# Patient Record
Sex: FEMALE | Race: WHITE
[De-identification: names, ages, dates, MRNs, and addresses within clinical notes are randomized per-mention and may not be internally consistent; named-entity substitution may affect disease eponyms.]

---

## 2022-03-31 ENCOUNTER — HOSPITAL ENCOUNTER (INPATIENT)
Dept: HOSPITAL 47 - 3SCARD | Age: 72
LOS: 15 days | Discharge: NURSING, REHAB OR LONG-TERM CARE | DRG: 246 | End: 2022-04-15
Attending: INTERNAL MEDICINE | Admitting: INTERNAL MEDICINE
Payer: MEDICARE

## 2022-03-31 VITALS — BODY MASS INDEX: 36.4 KG/M2

## 2022-03-31 DIAGNOSIS — I65.23: ICD-10-CM

## 2022-03-31 DIAGNOSIS — K05.10: ICD-10-CM

## 2022-03-31 DIAGNOSIS — I25.5: ICD-10-CM

## 2022-03-31 DIAGNOSIS — Z90.721: ICD-10-CM

## 2022-03-31 DIAGNOSIS — Z82.3: ICD-10-CM

## 2022-03-31 DIAGNOSIS — K56.41: ICD-10-CM

## 2022-03-31 DIAGNOSIS — Z87.01: ICD-10-CM

## 2022-03-31 DIAGNOSIS — I25.10: Primary | ICD-10-CM

## 2022-03-31 DIAGNOSIS — Z95.5: ICD-10-CM

## 2022-03-31 DIAGNOSIS — D62: ICD-10-CM

## 2022-03-31 DIAGNOSIS — I21.4: ICD-10-CM

## 2022-03-31 DIAGNOSIS — R53.83: ICD-10-CM

## 2022-03-31 DIAGNOSIS — I25.2: ICD-10-CM

## 2022-03-31 DIAGNOSIS — Z79.899: ICD-10-CM

## 2022-03-31 DIAGNOSIS — Z79.02: ICD-10-CM

## 2022-03-31 DIAGNOSIS — I27.20: ICD-10-CM

## 2022-03-31 DIAGNOSIS — Z87.891: ICD-10-CM

## 2022-03-31 DIAGNOSIS — Z28.310: ICD-10-CM

## 2022-03-31 DIAGNOSIS — Z88.8: ICD-10-CM

## 2022-03-31 DIAGNOSIS — Z95.2: ICD-10-CM

## 2022-03-31 DIAGNOSIS — J96.11: ICD-10-CM

## 2022-03-31 DIAGNOSIS — Z99.81: ICD-10-CM

## 2022-03-31 DIAGNOSIS — I50.23: ICD-10-CM

## 2022-03-31 DIAGNOSIS — Z79.82: ICD-10-CM

## 2022-03-31 DIAGNOSIS — J98.4: ICD-10-CM

## 2022-03-31 DIAGNOSIS — Z82.49: ICD-10-CM

## 2022-03-31 DIAGNOSIS — Z20.822: ICD-10-CM

## 2022-03-31 DIAGNOSIS — I25.42: ICD-10-CM

## 2022-03-31 DIAGNOSIS — I42.0: ICD-10-CM

## 2022-03-31 DIAGNOSIS — E78.5: ICD-10-CM

## 2022-03-31 DIAGNOSIS — Z90.5: ICD-10-CM

## 2022-03-31 DIAGNOSIS — J44.9: ICD-10-CM

## 2022-03-31 DIAGNOSIS — I08.3: ICD-10-CM

## 2022-03-31 DIAGNOSIS — J96.21: ICD-10-CM

## 2022-03-31 DIAGNOSIS — M62.81: ICD-10-CM

## 2022-03-31 DIAGNOSIS — F33.9: ICD-10-CM

## 2022-03-31 DIAGNOSIS — N17.9: ICD-10-CM

## 2022-03-31 DIAGNOSIS — Z63.4: ICD-10-CM

## 2022-03-31 DIAGNOSIS — I11.0: ICD-10-CM

## 2022-03-31 DIAGNOSIS — I50.82: ICD-10-CM

## 2022-03-31 DIAGNOSIS — J91.8: ICD-10-CM

## 2022-03-31 DIAGNOSIS — I97.630: ICD-10-CM

## 2022-03-31 DIAGNOSIS — K66.1: ICD-10-CM

## 2022-03-31 DIAGNOSIS — Z83.2: ICD-10-CM

## 2022-03-31 DIAGNOSIS — Z53.9: ICD-10-CM

## 2022-03-31 DIAGNOSIS — E66.2: ICD-10-CM

## 2022-03-31 DIAGNOSIS — F41.1: ICD-10-CM

## 2022-03-31 LAB
ALBUMIN SERPL-MCNC: 3.7 G/DL (ref 3.5–5)
ALP SERPL-CCNC: 59 U/L (ref 38–126)
ALT SERPL-CCNC: 16 U/L (ref 4–34)
ANION GAP SERPL CALC-SCNC: 7 MMOL/L
APTT BLD: 42.9 SEC (ref 22–30)
AST SERPL-CCNC: 21 U/L (ref 14–36)
BASOPHILS # BLD AUTO: 0.1 K/UL (ref 0–0.2)
BASOPHILS NFR BLD AUTO: 1 %
BUN SERPL-SCNC: 22 MG/DL (ref 7–17)
CALCIUM SPEC-MCNC: 8.8 MG/DL (ref 8.4–10.2)
CHLORIDE SERPL-SCNC: 100 MMOL/L (ref 98–107)
CO2 SERPL-SCNC: 28 MMOL/L (ref 22–30)
EOSINOPHIL # BLD AUTO: 0 K/UL (ref 0–0.7)
EOSINOPHIL NFR BLD AUTO: 1 %
ERYTHROCYTE [DISTWIDTH] IN BLOOD BY AUTOMATED COUNT: 4.44 M/UL (ref 3.8–5.4)
ERYTHROCYTE [DISTWIDTH] IN BLOOD: 14 % (ref 11.5–15.5)
GLUCOSE BLD-MCNC: 133 MG/DL (ref 75–99)
GLUCOSE SERPL-MCNC: 128 MG/DL (ref 74–99)
HCT VFR BLD AUTO: 40.6 % (ref 34–46)
HGB BLD-MCNC: 12.5 GM/DL (ref 11.4–16)
INR PPP: 1.1 (ref ?–1.2)
LYMPHOCYTES # SPEC AUTO: 0.6 K/UL (ref 1–4.8)
LYMPHOCYTES NFR SPEC AUTO: 11 %
MCH RBC QN AUTO: 28.3 PG (ref 25–35)
MCHC RBC AUTO-ENTMCNC: 30.9 G/DL (ref 31–37)
MCV RBC AUTO: 91.4 FL (ref 80–100)
MONOCYTES # BLD AUTO: 0.4 K/UL (ref 0–1)
MONOCYTES NFR BLD AUTO: 7 %
NEUTROPHILS # BLD AUTO: 4.5 K/UL (ref 1.3–7.7)
NEUTROPHILS NFR BLD AUTO: 79 %
PH UR: 6.5 [PH] (ref 5–8)
PLATELET # BLD AUTO: 153 K/UL (ref 150–450)
POTASSIUM SERPL-SCNC: 3.8 MMOL/L (ref 3.5–5.1)
PROT SERPL-MCNC: 6.4 G/DL (ref 6.3–8.2)
PT BLD: 11.3 SEC (ref 9–12)
SODIUM SERPL-SCNC: 135 MMOL/L (ref 137–145)
SP GR UR: 1.04 (ref 1–1.03)
UROBILINOGEN UR QL STRIP: <2 MG/DL (ref ?–2)
WBC # BLD AUTO: 5.7 K/UL (ref 3.8–10.6)

## 2022-03-31 PROCEDURE — 30233N1 TRANSFUSION OF NONAUTOLOGOUS RED BLOOD CELLS INTO PERIPHERAL VEIN, PERCUTANEOUS APPROACH: ICD-10-PCS

## 2022-03-31 PROCEDURE — 84132 ASSAY OF SERUM POTASSIUM: CPT

## 2022-03-31 PROCEDURE — 86850 RBC ANTIBODY SCREEN: CPT

## 2022-03-31 PROCEDURE — 93880 EXTRACRANIAL BILAT STUDY: CPT

## 2022-03-31 PROCEDURE — 80162 ASSAY OF DIGOXIN TOTAL: CPT

## 2022-03-31 PROCEDURE — 74018 RADEX ABDOMEN 1 VIEW: CPT

## 2022-03-31 PROCEDURE — 84439 ASSAY OF FREE THYROXINE: CPT

## 2022-03-31 PROCEDURE — 83735 ASSAY OF MAGNESIUM: CPT

## 2022-03-31 PROCEDURE — 94640 AIRWAY INHALATION TREATMENT: CPT

## 2022-03-31 PROCEDURE — 80048 BASIC METABOLIC PNL TOTAL CA: CPT

## 2022-03-31 PROCEDURE — 87635 SARS-COV-2 COVID-19 AMP PRB: CPT

## 2022-03-31 PROCEDURE — 36410 VNPNXR 3YR/> PHY/QHP DX/THER: CPT

## 2022-03-31 PROCEDURE — 85610 PROTHROMBIN TIME: CPT

## 2022-03-31 PROCEDURE — 74174 CTA ABD&PLVS W/CONTRAST: CPT

## 2022-03-31 PROCEDURE — 93922 UPR/L XTREMITY ART 2 LEVELS: CPT

## 2022-03-31 PROCEDURE — 83036 HEMOGLOBIN GLYCOSYLATED A1C: CPT

## 2022-03-31 PROCEDURE — 71046 X-RAY EXAM CHEST 2 VIEWS: CPT

## 2022-03-31 PROCEDURE — 85027 COMPLETE CBC AUTOMATED: CPT

## 2022-03-31 PROCEDURE — 71275 CT ANGIOGRAPHY CHEST: CPT

## 2022-03-31 PROCEDURE — 71250 CT THORAX DX C-: CPT

## 2022-03-31 PROCEDURE — 80069 RENAL FUNCTION PANEL: CPT

## 2022-03-31 PROCEDURE — 76937 US GUIDE VASCULAR ACCESS: CPT

## 2022-03-31 PROCEDURE — 84443 ASSAY THYROID STIM HORMONE: CPT

## 2022-03-31 PROCEDURE — 71045 X-RAY EXAM CHEST 1 VIEW: CPT

## 2022-03-31 PROCEDURE — 85025 COMPLETE CBC W/AUTO DIFF WBC: CPT

## 2022-03-31 PROCEDURE — 86900 BLOOD TYPING SEROLOGIC ABO: CPT

## 2022-03-31 PROCEDURE — 86901 BLOOD TYPING SEROLOGIC RH(D): CPT

## 2022-03-31 PROCEDURE — 92920 PRQ TRLUML C ANGIOP 1ART&/BR: CPT

## 2022-03-31 PROCEDURE — 87070 CULTURE OTHR SPECIMN AEROBIC: CPT

## 2022-03-31 PROCEDURE — 83880 ASSAY OF NATRIURETIC PEPTIDE: CPT

## 2022-03-31 PROCEDURE — 86920 COMPATIBILITY TEST SPIN: CPT

## 2022-03-31 PROCEDURE — 80053 COMPREHEN METABOLIC PANEL: CPT

## 2022-03-31 PROCEDURE — 93306 TTE W/DOPPLER COMPLETE: CPT

## 2022-03-31 PROCEDURE — 93970 EXTREMITY STUDY: CPT

## 2022-03-31 PROCEDURE — 85730 THROMBOPLASTIN TIME PARTIAL: CPT

## 2022-03-31 PROCEDURE — 93571 IV DOP VEL&/PRESS C FLO 1ST: CPT

## 2022-03-31 PROCEDURE — 82306 VITAMIN D 25 HYDROXY: CPT

## 2022-03-31 PROCEDURE — 70486 CT MAXILLOFACIAL W/O DYE: CPT

## 2022-03-31 PROCEDURE — 81003 URINALYSIS AUTO W/O SCOPE: CPT

## 2022-03-31 PROCEDURE — 80074 ACUTE HEPATITIS PANEL: CPT

## 2022-03-31 PROCEDURE — 94150 VITAL CAPACITY TEST: CPT

## 2022-03-31 PROCEDURE — 94760 N-INVAS EAR/PLS OXIMETRY 1: CPT

## 2022-03-31 PROCEDURE — 80061 LIPID PANEL: CPT

## 2022-03-31 RX ADMIN — CEFAZOLIN SCH MLS/HR: 330 INJECTION, POWDER, FOR SOLUTION INTRAMUSCULAR; INTRAVENOUS at 13:59

## 2022-03-31 SDOH — SOCIAL STABILITY - SOCIAL INSECURITY: DISSAPEARANCE AND DEATH OF FAMILY MEMBER: Z63.4

## 2022-03-31 NOTE — P.GSCN
History of Present Illness


Consult date: 03/31/22


Reason for Consult: 





Coronary artery disease, severe aortic valve stenosis and moderate to severe 

mitral valve regurgitation on transthoracic echocardiogram.


Requesting physician: Delvin Vega


History of present illness: 





This is a 71-year-old female patient who follows with Dr. Michelle Knight for her 

primary care on an outpatient basis.  She also follows with Dr. Haynes for 

her cardiac care.  She has a past medical history significant for a non-ST 

elevated myocardial infarction, cardiomyopathy with an ejection fraction between

15 and 20%, severe aortic valve stenosis, moderate to severe mitral valve 

regurgitation, chronic systolic heart failure, hypertension, hyperlipidemia, 

depression, morbid obesity with a BMI of 34.8 kg/m, remote history of nicotine 

dependence, chronic hypoxic respiratory failure on home O2 at 2 L nasal cannula 

since having COVID-19 pneumonia in September 2021 and remains unvaccinated 

against COVID-19.  She presented to the emergency department at [facility] with complaints of dizziness and nausea.  She denies any recent 

fever, chills, vomiting, palpitations, chest pain, or syncope.  According to the

patient she was recently hospitalized on 03/14/2022 due to shortness of breath 

and increased swelling to her bilateral lower extremities.  Subsequently, she 

reports she spent 8 days at [facility] and was diagnosed and 

treated for congestive heart failure.  The patient reports that she feels that 

the symptoms she presented with this time was due to a reaction from taking 

hydralazine.  She reports after she took the hydralazine she started to feel 

like she was having a hot flash, nauseous and dizzy.  While at [facility] she did have some labs drawn which showed an elevated troponin 

and a proBNP of 5822.  Her other labs showed a WBC count of 4.6, hemoglobin 

11.9, platelets 134, BUN 20, creatinine 0.8, and glucose 124.  A chest x-ray 

report from [facility] reports cardiomegaly with tiny right 

pleural effusion and mild central vascular congestion which could be suggestive 

of CHF exacerbation.  The transthoracic 2-D echocardiogram completed on 

03/14/2022 demonstrated a calcified aortic valve, trace aortic valve 

regurgitation, severe aortic valve stenosis, moderate to severe mitral valve 

regurgitation, mild tricuspid valve regurgitation and a dilated left ventricle, 

with global hypokinesia of the left ventricle, mild concentric left ventricular 

hypertrophy, and a left ventricular systolic function to be severely decreased 

with an ejection fraction of 15-20%.  Subsequently, due to the patient's presen

ting symptoms, and elevated troponins and a consult was placed to Dr. Haynes.  A cardiac catheterization was completed this morning 03/31/2022 

which demonstrated mild to moderate disease in the proximal left anterior 

descending coronary artery, a lesion involving the ostium of the diagonal barbara

nary artery, the circumflex coronary artery to be free of any significant focal 

lesion, a critical lesion involving the ostium of the right coronary artery with

a 99% stenosis and a critical aortic stenosis and evidence of moderate mitral 

valve regurgitation.  Due to the findings on the cardiac catheterization the 

patient was subsequently transferred to [facility] for further

evaluation and to undergo possible PCI of the right coronary artery.  The 

patient was subsequently taken to the cardiac catheterization lab by Dr. ARIELLE Vega, although was unsuccessful at PCI.  Due to the unsuccessful PCI a consult 

was placed to Dr. Filiberto Dior from cardiothoracic surgery for further evaluation 

and treatment recommendations including evaluation for cardiac surgery.





Review of Systems





A 14 point review of systems was completed and was negative except as mentioned 

in the HPI.





Past Medical History


Past Medical History: Heart Failure, GERD/Reflux, Hyperlipidemia, Hypertension, 

Myocardial Infarction (non Q-wave), Pneumonia (COVID-19 pneumonia in September 2021, uses 2 L of home O2 since her diagnosis of COVID-19 pneumonia.)


Additional Past Medical History / Comment(s): Pneumonia 5 years ago.  Sepsis


Last Myocardial Infarction Date:: 3/29/22


History of Any Multi-Drug Resistant Organisms: None Reported


Past Surgical History: Heart Catheterization


Additional Past Surgical History / Comment(s): Cyst removed from right ovary,.  

right ovary removed and part of the left ovary


Past Anesthesia/Blood Transfusion Reactions: No Reported Reaction


Past Psychological History: Depression


Smoking Status: Former smoker


Past Alcohol Use History: None Reported


Past Drug Use History: None Reported





- Past Family History


  ** Mother


Additional Family Medical History / Comment(s): Varicous veins.  Patient states 

"she had alot of heart problems"





  ** Father


Additional Family Medical History / Comment(s): Patient states "my dad had a lot

of heart issues."





Medications and Allergies


                                Home Medications











 Medication  Instructions  Recorded  Confirmed  Type


 


Carvedilol [Coreg] 6.25 mg PO BID 03/31/22 03/31/22 History


 


Digoxin [Lanoxin] 125 mcg PO DAILY 03/31/22 03/31/22 History


 


Furosemide [Lasix] 40 mg PO BID 03/31/22 03/31/22 History


 


Ipratropium-Albuterol Nebulize 3 ml INHALATION RT-Q6H PRN 03/31/22 03/31/22 

History





[Duoneb 0.5 mg-3 mg/3 ml Soln]    


 


Melatonin 3 mg PO HS 03/31/22 03/31/22 History


 


Pantoprazole Sodium [Protonix] 40 mg PO DAILY 03/31/22 03/31/22 History


 


Sertraline [Zoloft] 50 mg PO DAILY 03/31/22 03/31/22 History


 


Spironolactone [Aldactone] 25 mg PO DAILY 03/31/22 03/31/22 History


 


polyethylene glycoL 3350 [Miralax] 17 gm PO DAILY PRN 03/31/22 03/31/22 History








                                    Allergies











Allergy/AdvReac Type Severity Reaction Status Date / Time


 


hydralazine AdvReac  Syncope, Verified 03/31/22 15:40





   hot flashes  


 


lisinopril AdvReac  Cough Verified 03/31/22 15:40














Surgical - Exam


                                   Vital Signs











Pulse Resp


 


 85   14 


 


 03/31/22 13:25  03/31/22 13:25














- General


well developed, well nourished, no distress, no pain, obese





- Eyes


PERRL, normal ocular movement, no pale, no icteric





- ENT


normal pinna, normal nares, normal mucosa, no hearing loss, no congestion





- Neck





Neck is supple, no lymphadenopathy.


no masses, trachea midline, no venous distension


carotid bruit: bilateral (Carotid bruits could be radiating from her systolic 

heart murmur)





- Respiratory





Lung sounds with few scattered expiratory wheezes throughout, diminished 

bilateral bases right greater than left.  Respirations are symmetrical and 

nonlabored.  2 L nasal cannula with oxygen saturations 94%.





- Cardiovascular





Regular rhythm and rate.  S1 and S2 present, negative for S3 or gallop.  

Positive systolic murmur 3/6.  No edema present.





- Abdomen





Abdomen is soft, nontender and nondistended.  Active bowel sounds present in all

 4 abdominal quadrants.  No guarding or rigidity.  No organomegaly appreciated. 

 Obese.





- Integumentary





Skin is warm and dry.  No clubbing or cyanosis is present.


no rash, no growths, no abnormal pigmentation





- Neurologic





No focal deficits.





- Musculoskeletal





Moves all 4 extremities with equal strength bilateral.





- Psychiatric


oriented to time, oriented to person, oriented to place, speech is normal, 

memory intact





Results





- Labs





                                 04/04/22 08:57





                                 04/04/22 08:57


                  Abnormal Lab Results - Last 24 Hours (Table)











  03/31/22 03/31/22 03/31/22 Range/Units





  14:23 14:54 14:54 


 


MCHC   30.9 L   (31.0-37.0)  g/dL


 


Lymphocytes #   0.6 L   (1.0-4.8)  k/uL


 


APTT    42.9 H  (22.0-30.0)  sec


 


POC Glucose (mg/dL)  133 H    (75-99)  mg/dL














- Imaging


Additional studies: 





Cardiac catheterization results and 2-D echocardiogram report reviewed by Dr. Filiberto Dior.





Assessment and Plan


Assessment: 





1.  Coronary artery disease with unsuccessful PCI to her right coronary artery


2.  Severe aortic valve stenosis with mild aortic valve regurgitation


3.  Moderate to severe mitral valve regurgitation


4.  Non-ST elevated myocardial infarction with elevated troponins


5.  Dilated cardiomyopathy with an ejection fraction of 15-20% on her 

transthoracic 2-D echocardiogram from 03/14/2022


6.  Chronic systolic heart failure


7.  Hypertension


8.  Hyperlipidemia


9.  COVID-19 pneumonia in September 2021, chronic hypoxic respiratory failure on

 2 L nasal cannula home oxygen, remains unvaccinated for COVID-19


10.  Morbid obesity


11.  Remote history of nicotine dependence


Plan: 





The patient was seen and examined at her bedside in the intensive care unit by 

Dr. Filiberto Dior from cardiothoracic surgery.  Her chart and diagnostics were 

reviewed.  Preoperative teaching and preoperative workup initiated.  Dr. Filiberto Dior and Dr. ARIELLE Vega from cardiology discussed the plan of care with shared d

ecision-making.  Once the preoperative testing has been collected an STS risk 

score will be calculated and discussed with the patient.  We will repeat a 

transthoracic 2-D echocardiogram to see if her LV function has improved since 

initiation of medical therapy.  Once the patient is able to ambulate and we will

 obtain a 5 m walk test.  A computed tomography scan of her chest without 

contrast was ordered for further evaluation due to her recent COVID-19 pneumonia

 infection.  Continue to maximize medical therapy with aspirin, statin and beta 

blocker.  Medical management and other comorbidities per primary care service.  

Once her preoperative testing has been collected further decisions will be made 

regarding cardiac surgery versus high risk PCI, TAVR, versus medical therapy.  

We will also consult Dr. Lopez for dental clearance and we will obtain a 

facial CT with Panorex reconstruction.  More recommendations to follow based on 

patient's clinical course.





Thank you Dr. Vega for this consult and we look forward to working with you in 

the care of this patient.





I have personally seen and examined the patient, performed the documentation and

 the assessment and plan as written.  Number of minutes spent on the visit, 30 

minutes  . Mark WYNN





The patient is a 71 year old female with a history of multiple medical problems 

who was diagnosed with COVID-19 last summer in [place]. Since that time, her 

 passed away, she has been on home oxygen, and her mobility has been 

greatly diminished requiring a walker/scooter. She subsequently re-located to 

the area. Current workup now reveals a tight proximal RCA lesion as well as 

critical AS. PCI on the RCA was attempted by Dr. Vega today in the cath lab 

today without success. There was also concern for a limited dissection involving

 the proximal RCA. She is currently hemodynamically stable and chest pain free 

in the ICU. All of her studies were reviewed. There is no indication for 

emergent surgical intervention at this time. We will therefore initiate our 

standard pre-operative workup including chest CT and pulmonary function tests 

given her history of COVID-19 and requirement for home oxygen. Recent echo 

revealed an EF of 15-20% and moderate to severe MR in addition to aortic 

stenosis. Additional recommendations including candidacy for surgery will follow

 based on results of testing. Spoke with patient's daughter via telephone to 

discuss patient's status as well. 





I have personally seen and examined the patient, reviewed the documentation and 

agree with the assessment and plan as written. Number of minutes spent on the 

visit: 60 minutes. Filiberto Dior M.D.

## 2022-03-31 NOTE — US
EXAMINATION TYPE: US carotid duplex BILAT

 

DATE OF EXAM: 3/31/2022

 

COMPARISON: NONE

 

CLINICAL HISTORY: Pre-Op Cardiac Surgery.

 

Exam done portable

 

EXAM MEASUREMENTS: 

 

RIGHT:  Peak Systolic Velocity (PSV) cm/sec

----- Right CCA:  62.4  

----- Right ICA:  81.2     

----- Right ECA:  49.6   

ICA/CCA ratio:  1.3    

 

RIGHT:  End Diastole cm/sec

----- Right CCA:  18.8   

----- Right ICA:  21.6      

----- Right ECA:  6.3     

 

LEFT:  Peak Systolic Velocity (PSV) cm/sec

----- Left CCA:  88.9  

----- Left ICA:  61.8   

----- Left ECA:  50.4  

ICA/CCA ratio:  0.7  

 

LEFT:  End Diastole cm/sec

----- Left CCA:  27.2  

----- Left ICA:  18.4   

----- Left ECA:  0.0 

 

VERTEBRALS (direction of flow):

Right Vertebral: Antegrade

Left Vertebral: Antegrade

 

Rhythm:  Normal

 

No significant stenosis

 

 

 

IMPRESSION:  

Less than 50% stenosis bilateral carotid systems.   

 

 

Criteria for Assigning % of Stenosis / Diameter reduction

(Estimation based on the indirect measurements of the internal carotid artery velocities (ICA PSV).

1.  Normal (no stenosis)=ICA PSV < 125 cm/s: ratio < 2.0: ICA EDV<40 cm/s.

2. Less than 50% stenosis=ICA PSV < 125 cm/s: ratio < 2.0: ICA EDV<40 cm/s.

3.  50 to 69% stenosis=ICA PSV of 125 to 230 cm/s: ration 2.0 ? 4.0: ICA EDV  cm/s.

4.  Greater than 70% stenosis to near occlusion= ICA PSV > 230 cm/s: ratio > 4.0: ICA EDV > 100 cm/s.
 

5.  Near occlusion= ICA PSV velocities may be low or undetectable: variable ratio and ICA EDV.

6.  Total occlusion=unable to detect flow.

## 2022-03-31 NOTE — P.HPIM
History of Present Illness


H&P Date: 22





HISTORY OF PRESENT ILLNESS


This is a 71-year-old female with past medical history of dilated cardiomyopathy

with ejection fraction of 15-20%, severe aortic valve stenosis, mild aortic 

regurgitation with moderate to severe mitral regurgitation, chronic systolic 

heart failure, hyperlipidemia, obesity with possible obstructive sleep apnea and

obesity hypoventilation syndrome, chronic hypoxic respiratory failure on home O2

at 2 L nasal cannula, hypertension hypertensive cardio vascular disease, 

previous Covid 19 infection.  Patient presented to [facility]

ight elevation of troponin, EKG showed changes suggestive ischemic changes and 

was started on aspirin, heparin drip and admitted to the hospital.  Patient was 

seen by cardiology. She was supposed to have a heart catheterization done as an 

outpatient along with [facility] for possible aortic valve replacement and mitral valve

and possible CABG.  Patient underwent coronary angiography finding right dominan

t vessel, 99% ostial stenosis, aortic pressure is 100/70, 40 mm gradient across 

the aortic valve.  Left ventricular end diastolic pressure is 2530.  Patient 

was transferred to [facility] for PTCA and stent placement of 

the RCA which was unsuccessful.  Consult with cardiothoracic surgery for CABG 

and valve repair/replacement.





REVIEW OF SYSTEMS


Constitutional: No fever, no chills, no night sweats.  No weight change.  No 

weakness, fatigue or lethargy.  No daytime sleepiness.


EENT: No headache.  No blurred vision or double vision, no loss of vision.  No 

loss of Hearing, no ringing in the ears, no dizziness.  No nasal drainage or 

congestion.  No epistaxis.  No sore throat.


Lungs: No shortness of breath, + cough, no sputum production.  No wheezing.  Re

ports dyspnea on exertion.


Cardiovascular: No chest pain, no lower extremity edema.  No palpitations.  No 

paroxysmal nocturnal dyspnea.  No orthopnea.  No lightheadedness or dizziness.  

No syncopal episodes.


Abdominal: No abdominal pain.  No nausea, vomiting.  No diarrhea.  No 

constipation.  No bloody or tarry stools.  No loss of appetite.


Genitourinary: No dysuria, increased frequency, urgency.  No urinary retention.


Musculoskeletal: No myalgias.  No muscle weakness, no gait dysfunction, no romelia

quent falls.  No back pain.  No neck pain.


Integumentary: No wounds, no lesions.  No rash or pruritus.  No unusual 

bruising.  No change in hair or nails.


Neurologic: No aphasia. No facial droop. No change in mentation. No head injury.

No headache. No paralysis. No paresthesia.


Psychiatric: No depression.  No anxiety.  No mood swings.


Endocrine: No abnormal blood sugars.  No weight change.  No excessive sweating 

or thirst.  No cold intolerance.  





MEDICAL HISTORY


Covid 19 infection


Hypertension hypertensive cardiovascular disease


Hyperlipidemia


Dilated cardiomyopathy with ejection fraction of 20%


Severe aortic valve stenosis


Mild aortic regurgitation, severe mitral regurgitation, obesity with possible 

obstructive sleep apnea and obesity hypoventilation syndrome


Chronic systolic heart failure





SURGICAL HISTORY


Ovarian cyst status post right nephrectomy with tubal ligation


Partial left oophorectomy


Colonoscopy





SOCIAL HISTORY


Patient is a smoker pack a day starting at age 18 and quit in .  No alcohol 

use, illicit drug use or marijuana use.





FAMILY HISTORY


Mother  at age 82 with history of osteoarthritis.  Father  at age 84 

from coronary artery disease developed to have CVA after heart catheterization. 

Patient has 5 brothers and one of them has Charcot-Ramona-Tooth syndrome.  

Patient has one sister with alpha-1 antitrypsin deficiency.  Patient has one 

daughter with no major medical problems.








PHYSICAL EXAMINATION


Gen: This is a 71-year-old  female.


HEENT: Head is atraumatic, normocephalic. Pupils equal, round. Sclerae is 

anicteric. 


NECK: Supple. No JVD. No lymphadenopathy. No thyromegaly. 


LUNGS: Decreased breath sounds at the bases, few rhonchi, no expiratory wheeze, 

no chest wall tenderness.  No intercostal retractions.


HEART: First heart sound is depressed, second heart sound is normal, systolic 

ejection murmur 3/6 at the right upper sternal border radiating to the base of 

the neck, systolic ejection murmur 2/6 at the apex rating to the axilla.


ABDOMEN: Soft. Bowel sounds are present. No masses.  No tenderness.


EXTREMITIES: 1+ pedal edema.  No calf tenderness.  Her cells pedis +1 

bilaterally.  Bilateral hammertoes.


NEUROLOGICAL: Patient is awake, alert and oriented x3. Cranial nerves 2 through 

12 are grossly intact. 





ASSESSMENT AND PLAN


1.  ST elevated myocardial infarction with history of prior dilated 

cardiomyopathy status post coronary angiography finding right dominant vessel, 

99% ostial stenosis, aortic pressure is 100/70, 40 mm gradient across the aortic

valve.  Left ventricular end diastolic pressure is 2530.  Patient was 

transferred to [facility] for PTCA and stent placement of the 

RCA which was unsuccessful.  Consult with cardiothoracic surgery.  Continue 

aspirin 81 mg daily.  Patient is normally on Coreg 6.25 mg twice daily, 

hydralazine 25 mg twice daily.  Patient is unable to tolerate statins because of

significant myopathy and plan is to start her on Repatha under 40 mg subcu every

2 weeks.





2.  Dilated cardiomyopathy.  Cardiology consult.





3.  Severe aortic valve stenosis with mild aortic regurgitation.  Patient will 

require aortic valve replacement.





4.  Severe mitral regurgitation.





5.  Hyperlipidemia.  Patient unable to tolerate statins.





6.  Obesity with obstructive sleep apnea and obesity hypoventilation syndrome.  

Patient has not had sleep study done yet. 





7.  Chronic hypoxic respiratory failure secondary to his Covid 19 and underlying

COPD and possible obstructive sleep apnea and hypoventilation syndrome.  Patient

is normally on 2 L nasal cannula.





8.  Chronic systolic heart failure.  Patient is normally on Lasix 40 mg oral 

daily, hydralazine, Coreg, Aldactone 25 mg daily.  





9.  Hypertension, hypertensive cardiovascular disease.





10.  DVT prophylaxis.  Heparin subcu.





11.  GI prophylaxis.  Protonix 40 mg IV push daily.








Patient will be admitted to the hospital for a minimum of 2 night stay.





DISCHARGE PLAN


TBD





Impression and plan of care have been directed as dictated by the signing phys

josefina.  Charla Guevara nurse practitioner acting as scribe for signing physician.








Past Medical History





- Past Family History


  ** Mother


Additional Family Medical History / Comment(s): Varicous veins.  Patient states 

"she had alot of heart problems"





  ** Father


Additional Family Medical History / Comment(s): Patient states "my dad had a lot

of heart issues."





Medications and Allergies


                                Home Medications











 Medication  Instructions  Recorded  Confirmed  Type


 


Carvedilol [Coreg] 6.25 mg PO BID 22 History


 


Digoxin [Lanoxin] 125 mcg PO DAILY 22 History


 


Furosemide [Lasix] 40 mg PO BID 22 History


 


Ipratropium-Albuterol Nebulize 3 ml INHALATION RT-Q6H PRN 22 

History





[Duoneb 0.5 mg-3 mg/3 ml Soln]    


 


Melatonin 3 mg PO HS 22 History


 


Pantoprazole Sodium [Protonix] 40 mg PO DAILY 22 History


 


Sertraline [Zoloft] 50 mg PO DAILY 22 History


 


Spironolactone [Aldactone] 25 mg PO DAILY 22 History


 


polyethylene glycoL 3350 [Miralax] 17 gm PO DAILY PRN 22 History








                                    Allergies











Allergy/AdvReac Type Severity Reaction Status Date / Time


 


hydralazine AdvReac  Syncope, Verified 22 15:40





   hot flashes  


 


lisinopril AdvReac  Cough Verified 22 15:40














Results


CBC & Chem 7: 


                                 22 09:25





                                 22 09:25

## 2022-03-31 NOTE — PTCA
PERCUTANEOUSTRANS CORORONARY ANGIOGRAPHY



DATE OF SERVICE:

03/31/2022.



PROCEDURE:

PTCA of a 99% ostial RCA



PERFORMED BY:

Dr. IVIS Vega.



Moderate conscious sedation time was 35 minutes.



Mrs. Zelaya is a 71-year-old lady with severe aortic stenosis and ejection fraction

of 20%, transfer from [facility] after evaluation and cardiac cath by

Dr. Haynes, which revealed a 99% ostial RCA and severe aortic stenosis, a gradient

of 35 mm across aortic valve without significant but there was evidence of moderate

disease in the left system.  The initial plan was to do PCI of RCA and then do a TAVR

procedure.  With that in mind, I initiated the procedure.  I talked to the patient and

her daughter at length, explained to them the concerns, risks, benefits, and options

and that this was a very tight 99% lesion that would be difficult to cross.



PROCEDURE NOTE:

The existing 6-Uzbek introducer in the right femoral artery was used to perform

procedure.  I used a standard right Guillermina catheter and a Whisper wire, a long one.  I

gave 4000 units of heparin.  II had considerable difficulty manipulating the catheter

at the ostium because it was not easy to engage.  I was able to cross the lesion. Wire

was kept in the PLV branch and then I tried to advance a 2.5 caliber NC Trek balloon,

but then the catheter recoiled and the wire and the catheter came out and I could not

re-engage.  There was a small dissection around the origin of the right coronary

artery.  In view of this, I did not persist and I explained to the patient that I will

request the cardiac surgeon to perform a single vessel bypass and aortic valve

replacement given the dissection that I saw and also the difficulty in crossing the

right coronary artery lesion which was 99%.  The flow in the RCA persisted.  Patient

had no chest pain, nor did she have EKG changes.  Her blood pressure remained stable

between  systolic.  I sutured the sheath in and she will be sent to the ICU.  I

spoke to the patient in detail and also talked to her daughter by phone and spoke to

Dr. Dior who will come in and evaluate the patient.  Prognosis remains quite guarded.

I am recommending urgent aortic valve replacement and single-vessel bypass to RCA and

will await further input from Dr. Dior who will see her shortly.  Prognosis remains

guarded and risk for any intervention is quite high.





MMODL / IJN: [id number] / Job#: [id number]

## 2022-04-01 LAB
APTT BLD: 108 SEC (ref 22–30)
BASOPHILS # BLD AUTO: 0.1 K/UL (ref 0–0.2)
BASOPHILS NFR BLD AUTO: 1 %
EOSINOPHIL # BLD AUTO: 0.1 K/UL (ref 0–0.7)
EOSINOPHIL NFR BLD AUTO: 2 %
ERYTHROCYTE [DISTWIDTH] IN BLOOD BY AUTOMATED COUNT: 4.53 M/UL (ref 3.8–5.4)
ERYTHROCYTE [DISTWIDTH] IN BLOOD: 14.2 % (ref 11.5–15.5)
HCT VFR BLD AUTO: 42.3 % (ref 34–46)
HGB BLD-MCNC: 12.8 GM/DL (ref 11.4–16)
INR PPP: 1.1 (ref ?–1.2)
LYMPHOCYTES # SPEC AUTO: 0.6 K/UL (ref 1–4.8)
LYMPHOCYTES NFR SPEC AUTO: 8 %
MCH RBC QN AUTO: 28.2 PG (ref 25–35)
MCHC RBC AUTO-ENTMCNC: 30.2 G/DL (ref 31–37)
MCV RBC AUTO: 93.4 FL (ref 80–100)
MONOCYTES # BLD AUTO: 0.4 K/UL (ref 0–1)
MONOCYTES NFR BLD AUTO: 6 %
NEUTROPHILS # BLD AUTO: 5.8 K/UL (ref 1.3–7.7)
NEUTROPHILS NFR BLD AUTO: 82 %
PLATELET # BLD AUTO: 153 K/UL (ref 150–450)
PT BLD: 11.7 SEC (ref 9–12)
WBC # BLD AUTO: 7.1 K/UL (ref 3.8–10.6)

## 2022-04-01 RX ADMIN — DIGOXIN SCH MCG: 125 TABLET ORAL at 08:38

## 2022-04-01 RX ADMIN — SPIRONOLACTONE SCH MG: 25 TABLET, FILM COATED ORAL at 08:38

## 2022-04-01 RX ADMIN — FUROSEMIDE SCH MG: 10 INJECTION, SOLUTION INTRAMUSCULAR; INTRAVENOUS at 21:09

## 2022-04-01 RX ADMIN — FUROSEMIDE SCH MG: 10 INJECTION, SOLUTION INTRAMUSCULAR; INTRAVENOUS at 08:38

## 2022-04-01 RX ADMIN — SERTRALINE HYDROCHLORIDE SCH MG: 50 TABLET, FILM COATED ORAL at 08:38

## 2022-04-01 RX ADMIN — HEPARIN SODIUM PRN UNIT: 1000 INJECTION, SOLUTION INTRAVENOUS; SUBCUTANEOUS at 17:55

## 2022-04-01 RX ADMIN — IPRATROPIUM BROMIDE AND ALBUTEROL SULFATE SCH ML: .5; 3 SOLUTION RESPIRATORY (INHALATION) at 16:02

## 2022-04-01 RX ADMIN — CEFAZOLIN SCH MLS/HR: 330 INJECTION, POWDER, FOR SOLUTION INTRAMUSCULAR; INTRAVENOUS at 08:42

## 2022-04-01 RX ADMIN — Medication SCH MG: at 21:09

## 2022-04-01 RX ADMIN — ASPIRIN 81 MG CHEWABLE TABLET SCH MG: 81 TABLET CHEWABLE at 08:38

## 2022-04-01 RX ADMIN — IPRATROPIUM BROMIDE AND ALBUTEROL SULFATE SCH ML: .5; 3 SOLUTION RESPIRATORY (INHALATION) at 11:46

## 2022-04-01 RX ADMIN — ATORVASTATIN CALCIUM SCH MG: 40 TABLET, FILM COATED ORAL at 21:09

## 2022-04-01 RX ADMIN — IPRATROPIUM BROMIDE AND ALBUTEROL SULFATE SCH: .5; 3 SOLUTION RESPIRATORY (INHALATION) at 21:21

## 2022-04-01 RX ADMIN — HEPARIN SODIUM SCH MLS/HR: 10000 INJECTION, SOLUTION INTRAVENOUS at 09:15

## 2022-04-01 RX ADMIN — BUDESONIDE AND FORMOTEROL FUMARATE DIHYDRATE SCH: 160; 4.5 AEROSOL RESPIRATORY (INHALATION) at 21:21

## 2022-04-01 RX ADMIN — PANTOPRAZOLE SODIUM SCH MG: 40 INJECTION, POWDER, FOR SOLUTION INTRAVENOUS at 08:38

## 2022-04-01 NOTE — P.PN
Subjective


Progress Note Date: 04/01/22





HISTORY OF PRESENT ILLNESS


This is a 71-year-old female with past medical history of dilated cardiomyopathy

with ejection fraction of 15-20%, severe aortic valve stenosis, mild aortic 

regurgitation with moderate to severe mitral regurgitation, chronic systolic 

heart failure, hyperlipidemia, obesity with possible obstructive sleep apnea and

obesity hypoventilation syndrome, chronic hypoxic respiratory failure on home O2

at 2 L nasal cannula, hypertension hypertensive cardio vascular disease, 

previous Covid 19 infection.  Patient presented to [facility] 

slight elevation of troponin, EKG showed changes suggestive ischemic changes and

was started on aspirin, heparin drip and admitted to the hospital.  Patient was 

seen by cardiology. She was supposed to have a heart catheterization done as an 

outpatient along with [facility] for possible aortic valve replacement and mitral valve

and possible CABG.  Patient underwent coronary angiography finding right 

dominant vessel, 99% ostial stenosis, aortic pressure is 100/70, 40 mm gradient 

across the aortic valve.  Left ventricular end diastolic pressure is 2530.  

Patient was transferred to [facility] for PTCA and stent 

placement of the RCA which was unsuccessful.  Consult with cardiothoracic 

surgery for CABG and valve repair/replacement.





4/1: Patient is seen today in the intensive care unit.  She denies chest pain or

shortness of breath. She complains of anxiety and decreased appetite. Less cough

today.She has been seen by intensivist and cardiothoracic surgery, currently on 

heparin drip, consult was added for dental evaluation and clearance and 

panoramic CT.  Patient has been afebrile, heart rate in the 80s, blood pressure 

101/73, pulse ox 96%.  Repeat blood work reveals CBC is unremarkable.  Potassium

4.0.  Urinalysis negative for infection.  Hepatitis panel negative.  MRSA nasal 

screen is in process.  CT of the chest revealed cardiomegaly with suspected 

pulmonary edema and moderate to marked right sided pleural effusion.  Associated

infection can't be excluded.


Echocardiogram reveals EF of 25-30% with mild concentric left ventricular 

hypertrophy, severe global hypokinesia of the LV, severe aortic stenosis with 

gradient 78.27 mm a new 3/47.94 mmHg, trace mitral regurgitation, mild mitral 

stenosis, mild tricuspid regurgitation, mild pulmonary hypertension, RVSP 43.33 

mmHg.


Carotid ultrasound revealed less than 50% stenosis bilateral carotid systems.








REVIEW OF SYSTEMS


Constitutional: No fever, no chills, no night sweats.  No weight change.  No wea

kness, fatigue or lethargy.  No daytime sleepiness.


EENT: No headache.  No blurred vision or double vision, no loss of vision.  No 

loss of Hearing, no ringing in the ears, no dizziness.  No nasal drainage or 

congestion.  No epistaxis.  No sore throat.


Lungs: No shortness of breath, + cough, no sputum production.  No wheezing.  

Reports dyspnea on exertion.


Cardiovascular: No chest pain, no lower extremity edema.  No palpitations.  No 

paroxysmal nocturnal dyspnea.  No orthopnea.  No lightheadedness or dizziness.  

No syncopal episodes.


Abdominal: No abdominal pain.  No nausea, vomiting.  No diarrhea.  No 

constipation.  No bloody or tarry stools.  Reports loss of appetite.


Genitourinary: No dysuria, increased frequency, urgency.  No urinary retention.


Musculoskeletal: No myalgias.  No muscle weakness, no gait dysfunction, no 

frequent falls.  No back pain.  No neck pain.


Integumentary: No wounds, no lesions.  No rash or pruritus.  No unusual 

bruising.  No change in hair or nails.


Neurologic: No aphasia. No facial droop. No change in mentation. No head injury.

No headache. No paralysis. No paresthesia.


Psychiatric: No depression.  Reports anxiety.  No mood swings.


Endocrine: No abnormal blood sugars.  No weight change.  No excessive sweating 

or thirst.  No cold intolerance.  





PHYSICAL EXAMINATION


Gen: This is a 71-year-old  female.


HEENT: Head is atraumatic, normocephalic. Pupils equal, round. Sclerae is 

anicteric. 


NECK: Supple. No JVD. No lymphadenopathy. No thyromegaly. 


LUNGS: Decreased breath sounds at the bases, few rhonchi, no expiratory wheeze, 

no chest wall tenderness.  No intercostal retractions.


HEART: First heart sound is depressed, second heart sound is normal, systolic 

ejection murmur 3/6 at the right upper sternal border radiating to the base of 

the neck, systolic ejection murmur 2/6 at the apex rating to the axilla.


ABDOMEN: Soft. Bowel sounds are present. No masses.  No tenderness.


EXTREMITIES: 1+ pedal edema.  No calf tenderness.  Her cells pedis +1 

bilaterally.  Bilateral hammertoes.


NEUROLOGICAL: Patient is awake, alert and oriented x3. Cranial nerves 2 through 

12 are grossly intact. 





ASSESSMENT AND PLAN


1.  ST elevated myocardial infarction with history of prior dilated 

cardiomyopathy status post coronary angiography finding right dominant vessel, 

99% ostial stenosis, aortic pressure is 100/70, 40 mm gradient across the aortic

valve.  Left ventricular end diastolic pressure is 2530.  Patient was 

transferred to [facility] for PTCA and stent placement of the 

RCA which was unsuccessful.  Consult with cardiothoracic surgery appreciated.  

Intensivist consultation appreciated..  Continue aspirin 81 mg daily, on Coreg 

6.25 mg twice daily, hydralazine 25 mg twice daily-on hold.  Patient is unable 

to tolerate statins because of significant myopathy and plan is to start her on 

Repatha under 40 mg subcu every 2 weeks.  Dental evaluation with panoramic CT.





2.  Dilated cardiomyopathy.  Cardiology consult.  Continue digoxin 125 g daily,

Coreg.





3.  Severe aortic valve stenosis with mild aortic regurgitation.  Patient will 

require aortic valve replacement.





4.  Severe mitral regurgitation.





5.  Hyperlipidemia.  Patient unable to tolerate statins.





6.  Obesity with obstructive sleep apnea and obesity hypoventilation syndrome.  

Patient has not had sleep study done yet. 





7.  Chronic hypoxic respiratory failure secondary to his Covid 19 and underlying

COPD and possible obstructive sleep apnea and hypoventilation syndrome.  Patient

is normally on 2 L nasal cannula.





8.  Acute on chronic systolic heart failure.  Continue Lasix 40 mg IV every 12 

hours, Coreg, Aldactone 25 mg daily.  





9.  Hypertension, hypertensive cardiovascular disease.





10.  Recurrent depression and generalized anxiety disorder.  Continue Zoloft 50 

mg daily.





11.  DVT prophylaxis.  Heparin subcu.





11.  GI prophylaxis.  Protonix 40 mg IV push daily.








DISCHARGE PLAN


TBD





Impression and plan of care have been directed as dictated by the signing 

physician.  Charla Guevara nurse practitioner acting as scribe for signing 

physician.





Objective





- Vital Signs


Vital signs: 


                                   Vital Signs











Temp  97.6 F   04/01/22 08:00


 


Pulse  87   04/01/22 12:04


 


Resp  23   04/01/22 11:00


 


BP  101/73   04/01/22 11:00


 


Pulse Ox  96   04/01/22 11:00








                                 Intake & Output











 03/31/22 04/01/22 04/01/22





 18:59 06:59 18:59


 


Intake Total 750 830 157.487


 


Output Total 500 275 350


 


Balance 250 555 -192.513


 


Weight 107 kg 106.8 kg 


 


Intake:   


 


   650 120


 


    Sodium Chloride 0.9% 1, 250 650 120





    000 ml @ 20 mls/hr IV .   





    Q24H [facility] Rx#:[id number]   


 


  Intake, IV Titration   37.487





  Amount   


 


    Heparin Sod,Pork in 0.45%   37.487





    NaCl 25,000 unit In 0.45   





    % NaCl 1 250ml.bag @ 18   





    UNITS/KG/HR 19.224 mls/hr   





    IV .Q13H1M [facility] Rx#:   





    [id number]   


 


  Oral  180 


 


Output:   


 


  Urine 500 275 350


 


  Stool 0  


 


Other:   


 


  Voiding Method External Catheter External Catheter Incontinent





   External Catheter


 


  # Voids 0 0 0








                       ABP, PAP, CO, CI - Last Documented











Arterial Blood Pressure        107/66

















- Labs


CBC & Chem 7: 


                                 04/01/22 09:25





                                 04/01/22 09:25


Labs: 


                  Abnormal Lab Results - Last 24 Hours (Table)











  03/31/22 03/31/22 03/31/22 Range/Units





  14:23 14:54 14:54 


 


MCHC   30.9 L   (31.0-37.0)  g/dL


 


Lymphocytes #   0.6 L   (1.0-4.8)  k/uL


 


APTT    42.9 H  (22.0-30.0)  sec


 


Sodium     (137-145)  mmol/L


 


BUN     (7-17)  mg/dL


 


Glucose     (74-99)  mg/dL


 


POC Glucose (mg/dL)  133 H    (75-99)  mg/dL


 


Ur Specific Gravity     (1.001-1.035)  














  03/31/22 03/31/22 04/01/22 Range/Units





  14:54 16:08 09:25 


 


MCHC    30.2 L  (31.0-37.0)  g/dL


 


Lymphocytes #    0.6 L  (1.0-4.8)  k/uL


 


APTT     (22.0-30.0)  sec


 


Sodium  135 L    (137-145)  mmol/L


 


BUN  22 H    (7-17)  mg/dL


 


Glucose  128 H    (74-99)  mg/dL


 


POC Glucose (mg/dL)     (75-99)  mg/dL


 


Ur Specific Gravity   1.043 H   (1.001-1.035)  














  04/01/22 Range/Units





  10:30 


 


MCHC   (31.0-37.0)  g/dL


 


Lymphocytes #   (1.0-4.8)  k/uL


 


APTT  108.0 H*  (22.0-30.0)  sec


 


Sodium   (137-145)  mmol/L


 


BUN   (7-17)  mg/dL


 


Glucose   (74-99)  mg/dL


 


POC Glucose (mg/dL)   (75-99)  mg/dL


 


Ur Specific Gravity   (1.001-1.035)  








                      Microbiology - Last 24 Hours (Table)











 03/31/22 22:30 Nasal Screen MRSA/MSSA - Preliminary





 Nasal Swab

## 2022-04-01 NOTE — ECHOF
Referral Reason:LV fxn



MEASUREMENTS

--------

HEIGHT: 175.3 cm

WEIGHT: 62.6 kg

BP: 

IVSd:   1.3 cm     (0.6 - 1.1)

LVIDd:   5.7 cm     (3.9 - 5.3)

LVPWd:   1.9 cm     (0.6 - 1.1)

IVSs:   1.3 cm

LVIDs:   4.9 cm

LVPWs:   1.5 cm

Ao Diam:   3.4 cm     (2.0 - 3.7)

AV Cusp:   1.0 cm     (1.5 - 2.6)

LA Diam:   3.7 cm     (2.7 - 3.8)

MV EXCURSION:   14.056 mm     (> 18.000)

MV EF SLOPE:   153 mm/s     (70 - 150)

EPSS:   2.6 cm

MV E Stephen:   1.21 m/s

MV DecT:   244 ms

MV A Stephen:   0.77 m/s

MV E/A Ratio:   1.56 

AV maxP.27 mmHg

AV meanP.94 mmHg

RAP:   5.00 mmHg

RVSP:   43.33 mmHg







FINDINGS

--------

This was a technically difficult study with suboptimal views.

The left ventricle is mildly dilated.   There is mild concentric left ventricular hypertrophy.   Ther
e is severe global hypokinesis of LV .   Overall left ventricular systolic function is severely impai
red with, an EF between 25 - 30 %.

The RV was not well visualized.

, and the LA measures 3.7cm.

The right atrium was not well visualized.

Lumason used

Aortic valve is trileaflet and is severely thickened.   There is severe aortic stenosis present.   Pe
ak/mean gradient across the Aortic Valve is 78.27mmHg / 47.94mmHg.

The mitral valve is normal.   The mitral valve leaflets are moderately thickened.   There is trace mi
tral regurgitation.    The peak and mean MV gradients are 14.48mmHg 6.40mmHg as measured by doppler. 
  Mild mitral stenosis.

The tricuspid valve appears structurally normal.   Mild tricuspid regurgitation present.   There is m
ild pulmonary hypertension.   The right ventricular systolic pressure, as measured by Doppler, is 43.
33mmHg.

There is no pulmonic regurgitation present.

The aortic root size is normal.

IVC Not well visulized.

There is no pericardial effusion.



CONCLUSIONS

--------

1. The left ventricle is mildly dilated.

2. There is mild concentric left ventricular hypertrophy.

3. There is severe global hypokinesis of LV .

4. Overall left ventricular systolic function is severely impaired with, an EF between 25 - 30 %.

5. Aortic valve is trileaflet and is severely thickened.

6. There is severe aortic stenosis present.

7. Peak/mean gradient across the Aortic Valve is 78.27mmHg / 47.94mmHg.

8. The mitral valve leaflets are moderately thickened.

9. There is trace mitral regurgitation.

10. The peak and mean MV gradients are 14.48mmHg 6.40mmHg as measured by doppler.

11. Mild mitral stenosis.

12. Mild tricuspid regurgitation present.

13. There is mild pulmonary hypertension.

14. The right ventricular systolic pressure, as measured by Doppler, is 43.33mmHg.

15. There is no pericardial effusion.





SONOGRAPHER: Natacha Sanabria RDCS

## 2022-04-01 NOTE — P.PN
Subjective


Progress Note Date: 22


Principal diagnosis: 





Coronary artery disease with 99% RCA stenosis, non-STEMI this admission, status 

post unsuccessful PCI to the right coronary artery, severe aortic valve stenosi

s, trace mitral valve regurgitation with mild mitral stenosis, dilated 

cardiomyopathy, acute on chronic systolic heart failure.  Previous medical 

history of hypertension, hyperlipidemia, chronic hypoxic respiratory failure on 

2 L nasal cannula home oxygen, COVID-19 pneumonia in 2021,  remains 

unvaccinated for COVID-19, morbid obesity, depression, remote history of 

nicotine dependence, severe restrictive lung disease 





The patient was seen and examined this morning sitting up in bed in the 

intensive care unit.  She denies any chest pain, states she has been short of 

breath for 6 months and chronically on oxygen but no worsening of shortness of 

breath currently.  Denies nausea or flushing sensation currently which were the 

symptoms bringing her to the hospital.  The patient is very teary-eyed and 

scared, Get through conversation without crying, repeatedly says this is the 

first time she has been apart from her  of 53 years who  in October 

from Covid.  She was seen by Dr. Dior yesterday and preoperative testing was 

initiated.  She still needs facial CT and dental clearance.





Objective





- Vital Signs


Vital signs: 


                                   Vital Signs











Temp  97.6 F   22 08:00


 


Pulse  81   22 08:30


 


Resp  21   22 08:30


 


BP  101/68   22 08:30


 


Pulse Ox  96   22 08:30








                                 Intake & Output











 22





 18:59 06:59 18:59


 


Intake Total 750 830 20


 


Output Total 500 275 0


 


Balance 250 555 20


 


Weight 107 kg 106.8 kg 


 


Intake:   


 


   650 20


 


    Sodium Chloride 0.9% 1, 250 650 20





    000 ml @ 20 mls/hr IV .   





    Q24H [facility] Rx#:[id number]   


 


  Oral  180 


 


Output:   


 


  Urine 500 275 0


 


  Stool 0  


 


Other:   


 


  Voiding Method External Catheter External Catheter 


 


  # Voids 0 0 








                       ABP, PAP, CO, CI - Last Documented











Arterial Blood Pressure        107/66

















- Exam





CONSTITUTIONAL: Appears cooperative, very tearful


RESPIRATORY:  Lungs sounds coarse bilaterally.  Respirations even, nonlabored.  

Currently on 2 L nasal cannula with oxygen saturation 98%.  Able to achieve 1000

mL on incentive spirometry.  Strong loose cough.  


CARDIOVASCULAR:  S1, S2 present, systolic murmur present.  Regular rate and 

rhythm, sinus rhythm on telemetry.  Doppler lower extremity pulses bilaterally. 

Lower extremity edema present.  No calf pain or tenderness noted.  H


GASTROINTESTINAL:  Abdomen soft, nontender, nondistended.  Active bowel sounds 

present 4 quadrants.  Tolerating minimal diet


GENITOURINARY:  Continues to void clear, yellow urine


INTEGUMENTARY:  Skin is warm and dry with evidence of good perfusion.  


NEUROLOGIC:  Cranial nerves II through XII intact


MUSKULOSKELETAL:  Able to move all extremities, strength equal bilaterally


PSYCHIATRIC:  Alert and oriented to person place and time, depressed affect








- Allied health notes


Allied health notes reviewed: nursing





- Labs


CBC & Chem 7: 


                                 22 09:25





                                 22 09:25


Labs: 


                  Abnormal Lab Results - Last 24 Hours (Table)











  22 Range/Units





  14:23 14:54 14:54 


 


MCHC   30.9 L   (31.0-37.0)  g/dL


 


Lymphocytes #   0.6 L   (1.0-4.8)  k/uL


 


APTT    42.9 H  (22.0-30.0)  sec


 


Sodium     (137-145)  mmol/L


 


BUN     (7-17)  mg/dL


 


Glucose     (74-99)  mg/dL


 


POC Glucose (mg/dL)  133 H    (75-99)  mg/dL


 


Ur Specific Gravity     (1.001-1.035)  














  22 Range/Units





  14:54 16:08 


 


MCHC    (31.0-37.0)  g/dL


 


Lymphocytes #    (1.0-4.8)  k/uL


 


APTT    (22.0-30.0)  sec


 


Sodium  135 L   (137-145)  mmol/L


 


BUN  22 H   (7-17)  mg/dL


 


Glucose  128 H   (74-99)  mg/dL


 


POC Glucose (mg/dL)    (75-99)  mg/dL


 


Ur Specific Gravity   1.043 H  (1.001-1.035)  








                      Microbiology - Last 24 Hours (Table)











 22 22:30 Nasal Screen MRSA/MSSA - Preliminary





 Nasal Swab 














- Imaging and Cardiology


Chest x-ray: report reviewed, image reviewed





Reviewed heart catheterization films with Dr. Jacobs, reviewed carotid Doppler

and bedside spirometry results





Assessment and Plan


Assessment: 





1.  Coronary artery disease with 99% RCA stenosis, non-STEMI this admission, 

status post unsuccessful PCI to the right coronary artery


2.  Severe aortic valve stenosis, peak/mean gradient 78.27/47.94 mmHg


3.  Trace mitral regurgitation with mild mitral stenosis on transthoracic 

echocardiogram, peak/mean gradients of 14.48/6.4 mmHg, 


4.  Dilated cardiomyopathy


5.  Acute on chronic systolic heart failure, EF 25-30% on transthoracic 

echocardiogram


6.  History of hypertension


7.  Hyperlipidemia, cholesterol 333,  


8.  Chronic hypoxic respiratory failure on 2 L nasal cannula home oxygen


9.  COVID-19 pneumonia in 2021


10.  Remains unvaccinated for COVID-19


11.  Morbid obesity


12.  Depression, currently on Zoloft


13.  Remote history of nicotine dependence


14.  Severe restrictive lung disease, FEV1 45% of predicted 


15.  Generalized debility, patient uses walker for ambulation


Plan: 





1.  Continue aspirin, beta blocker.  Will add statin.  IV heparin initiated by 

cardiology


2.  Continue diuresis, heart failure management per cardiology


3.  STS risk calculated.  Patient is very high risk for surgery due to multiple 

comorbidities, final decision regarding surgery still pending rest of testing 

including CT scan and dental clearance.  High risk PCI still an option


4.  Medical management of other comorbidities per primary care service


5.  More recommendations to follow

## 2022-04-01 NOTE — P.CNPUL
History of Present Illness


Consult date: 04/01/22


Requesting physician: Xavier Dean


Reason for consult: other (Critical care management)


Chief complaint: Dizziness, nausea


History of present illness: 





This is a pleasant 71-year-old female patient who has a history of hypertension,

hyperlipidemia, anxiety/depression, obesity, previous chronic tobacco 

dependence, chronic hypoxic respiratory failure from a COVID-19 pneumonia in 

2021.  Remains unvaccinated.  She also has a previous non-ST segment elevation 

myocardial infarction, cardiomyopathy with ejection fraction 25-30%, severe 

aortic stenosis.  She was recently admitted to [facility] for 

dizziness and nausea and had undergone cardiac catheterization which revealed a 

99% stenosis of the ostial RCA and transferred here for intervention.  

Unfortunately the lesion was too tight to cross and the procedure was aborted.  

She is being evaluated by surgical services for possible single-vessel bypass 

and aortic valve replacement.  She is seen today in consultation in the i

ntensive care unit and his pacing possible surgery.  She is currently sitting up

in bed.  Awake and alert.  Anxious, teary-eyed.  She was found to have an FEV1 

value of 45% of predicted.  Currently, she denies any worsening shortness of 

breath, cough or congestion.  She is maintaining O2 saturations in the mid 90s 

on 2 L/m per nasal cannula.  She's been afebrile.  She has normal saline running

at 20 ML's per hour.  She remains on a heparin drip per weight base protocol.  

Computed tomography scan of the chest reveals moderate right-sided pleural 

effusion with some fluid in the fissure.  White count 7.1.  Hemoglobin 12.8.  

Sodium 135.  Potassium 4.0.  BUN 22.  Creatinine 0.65.  We'll switch 28.  AST 

21.  ALT 16.  Urinalysis negative.  Hepatitis screen negative.





Review of Systems





REVIEW OF SYSTEMS:


CONSTITUTIONAL: Positive for nausea and dizziness.  Denies any recent 

significant weight loss or weight gain.


EYES: Denies change in vision.


EARS, NOSE, MOUTH, THROAT: Denies headaches, denies sore throat.


CARDIOVASCULAR: Denies chest pain, palpitations or syncopal episodes.


RESPIRATORY: Denies shortness of breath, cough, congestion or hemoptysis.


GASTROINTESTINAL: Positive for nausea Denies change in appetite, denies 

abdominal pain


GENITOURINARY: Denies hematuria, denies infections.


MUSKULOSKELETAL: Denies pain, denies swelling.


INTEGUMENTARY: Denies rash, denies eczema.


NEUROLOGICAL: Denies recent memory loss, no recent seizure activity. 


PSYCHIATRIC: Denies anxiety, denies depression.


HEMATOLOGIC/LYMPHATIC: Denies anemia, denies enlarged lymph nodes.








Past Medical History


Past Medical History: Heart Failure, GERD/Reflux, Hyperlipidemia, Hypertension, 

Myocardial Infarction (non Q-wave), Pneumonia (COVID-19 pneumonia in September 2021, uses 2 L of home O2 since her diagnosis of COVID-19 pneumonia.)


Additional Past Medical History / Comment(s): Pneumonia 5 years ago.  Sepsis


Last Myocardial Infarction Date:: 3/29/22


History of Any Multi-Drug Resistant Organisms: None Reported


Past Surgical History: Heart Catheterization


Additional Past Surgical History / Comment(s): Cyst removed from right ovary,.  

right ovary removed and part of the left ovary


Past Anesthesia/Blood Transfusion Reactions: No Reported Reaction


Past Psychological History: Depression


Smoking Status: Former smoker


Past Alcohol Use History: None Reported


Past Drug Use History: None Reported





- Past Family History


  ** Mother


Additional Family Medical History / Comment(s): Varicous veins.  Patient states 

"she had alot of heart problems"





  ** Father


Additional Family Medical History / Comment(s): Patient states "my dad had a lot

of heart issues."





Medications and Allergies


                                Home Medications











 Medication  Instructions  Recorded  Confirmed  Type


 


Carvedilol [Coreg] 6.25 mg PO BID 03/31/22 03/31/22 History


 


Digoxin [Lanoxin] 125 mcg PO DAILY 03/31/22 03/31/22 History


 


Furosemide [Lasix] 40 mg PO BID 03/31/22 03/31/22 History


 


Ipratropium-Albuterol Nebulize 3 ml INHALATION RT-Q6H PRN 03/31/22 03/31/22 

History





[Duoneb 0.5 mg-3 mg/3 ml Soln]    


 


Melatonin 3 mg PO HS 03/31/22 03/31/22 History


 


Pantoprazole Sodium [Protonix] 40 mg PO DAILY 03/31/22 03/31/22 History


 


Sertraline [Zoloft] 50 mg PO DAILY 03/31/22 03/31/22 History


 


Spironolactone [Aldactone] 25 mg PO DAILY 03/31/22 03/31/22 History


 


polyethylene glycoL 3350 [Miralax] 17 gm PO DAILY PRN 03/31/22 03/31/22 History








                                    Allergies











Allergy/AdvReac Type Severity Reaction Status Date / Time


 


hydralazine AdvReac  Syncope, Verified 03/31/22 15:40





   hot flashes  


 


lisinopril AdvReac  Cough Verified 03/31/22 15:40














Physical Exam


Vitals: 


                                   Vital Signs











  Temp Pulse Pulse Resp BP BP Pulse Ox


 


 04/01/22 10:30   80   16  101/73   96


 


 04/01/22 09:30      95/64  


 


 04/01/22 09:00   72   16  101/68   98


 


 04/01/22 08:30   81   21  101/68   96


 


 04/01/22 08:00  97.6 F  78   16  91/59   96


 


 04/01/22 07:52        97


 


 04/01/22 07:30   85   16  91/59   98


 


 04/01/22 07:00   71   16  83/66   98


 


 04/01/22 06:30   71   19  83/66   96


 


 04/01/22 06:00   76   14  110/77   97


 


 04/01/22 05:00   85   16  96/59   97


 


 04/01/22 04:30   72   19  96/61   97


 


 04/01/22 04:00  97.9 F  70   20  103/70   96


 


 04/01/22 03:30   76   19  101/66   97


 


 04/01/22 03:00   78   19  104/71   97


 


 04/01/22 02:30   85   13  107/74   97


 


 04/01/22 02:00   82   19  110/71   97


 


 04/01/22 01:30   80   15  95/81   97


 


 04/01/22 01:00   97   19  87/62   94 L


 


 04/01/22 00:30   70   19    97


 


 04/01/22 00:06   79   21  102/67   99


 


 04/01/22 00:00  98.0 F  77   34 H  97/65   98


 


 03/31/22 23:30   80   22  94/54   97


 


 03/31/22 23:00   80   12  97/40   96


 


 03/31/22 22:30   80   15  94/62   96


 


 03/31/22 22:00   80   14  86/57   96


 


 03/31/22 21:30   78   19  95/57   97


 


 03/31/22 21:00   80   19  102/64   97


 


 03/31/22 20:30   85   18  99/83   97


 


 03/31/22 20:00  98.0 F  78   16  81/51   98


 


 03/31/22 19:30   82   15  92/51   97


 


 03/31/22 19:00   75   14  94/64   97


 


 03/31/22 18:30   86   11 L  105/54   96


 


 03/31/22 18:00   89   24  107/56   99


 


 03/31/22 17:30   86   18  98/62   98


 


 03/31/22 17:00   84   22  98/62   97


 


 03/31/22 16:30   81   19  86/67   95


 


 03/31/22 16:00  98.2 F  82   22  97/49   91 L


 


 03/31/22 15:30   87   14  95/66   91 L


 


 03/31/22 15:00   80   11 L  100/61   92 L


 


 03/31/22 14:30   76   15  100/63   94 L


 


 03/31/22 14:00   89   16  100/63   94 L


 


 03/31/22 13:50   84   13    93 L


 


 03/31/22 13:40   87   14   


 


 03/31/22 13:30   92  90  16   109/70  94 L


 


 03/31/22 13:25   85   14   








                                Intake and Output











 03/31/22 04/01/22 04/01/22





 22:59 06:59 14:59


 


Intake Total 400 630 40.961


 


Output Total 500 275 0


 


Balance -100 355 40.961


 


Intake:   


 


   450 40


 


    Sodium Chloride 0.9% 1, 400 450 40





    000 ml @ 20 mls/hr IV .   





    Q24H SHANDA Rx#:[id number]   


 


  Intake, IV Titration   0.961





  Amount   


 


    Heparin Sod,Pork in 0.45%   0.961





    NaCl 25,000 unit In 0.45   





    % NaCl 1 250ml.bag @ 18   





    UNITS/KG/HR 19.224 mls/hr   





    IV .Q13H1M SHANDA Rx#:   





    [id number]   


 


  Oral  180 


 


Output:   


 


  Urine 500 275 0


 


Other:   


 


  Voiding Method External Catheter External Catheter 


 


  # Voids 0  1


 


  Weight 107 kg 106.8 kg 














GENERAL EXAM: Alert, anxious, 71-year-old female, on 2 L nasal cannula, 

comfortable in no apparent distress.


HEAD: Normocephalic.


EYES: Normal reaction of pupils, equal size.


NOSE: Clear with pink turbinates.


THROAT: No erythema or exudates.


NECK: No masses, no JVD.


CHEST: No chest wall deformity.


LUNGS: Equal air entry with crackles in the right posterior base.


CVS: S1 and S2 normal with no audible murmur, regular rhythm.


ABDOMEN: No hepatosplenomegaly, normal bowel sounds, no guarding or rigidity.


SPINE: No scoliosis or deformity


SKIN: No rashes


CENTRAL NERVOUS SYSTEM: No focal deficits, tone is normal in all 4 extremities.


EXTREMITIES: There is no peripheral edema.  No clubbing, no cyanosis.  

Peripheral pulses are intact.





Results





- Laboratory Findings


CBC and BMP: 


                                 04/01/22 09:25





                                 04/01/22 09:25


PT/INR, D-dimer











PT  11.3 sec (9.0-12.0)   03/31/22  14:54    


 


INR  1.1  (<1.2)   03/31/22  14:54    








Abnormal lab findings: 


                                  Abnormal Labs











  03/31/22 03/31/22 03/31/22





  14:23 14:54 14:54


 


MCHC   30.9 L 


 


Lymphocytes #   0.6 L 


 


APTT    42.9 H


 


Sodium   


 


BUN   


 


Glucose   


 


POC Glucose (mg/dL)  133 H  


 


Ur Specific Gravity   














  03/31/22 03/31/22 04/01/22





  14:54 16:08 09:25


 


MCHC    30.2 L


 


Lymphocytes #    0.6 L


 


APTT   


 


Sodium  135 L  


 


BUN  22 H  


 


Glucose  128 H  


 


POC Glucose (mg/dL)   


 


Ur Specific Gravity   1.043 H 














- Diagnostic Findings


Chest x-ray: image reviewed


CT scan - chest: image reviewed





Assessment and Plan


Assessment: 





1 Non-ST segment elevation myocardial infarction in a patient found to have a 

99% stenosis of the ostial RCA with unsuccessful PCI.





2 Severe aortic stenosis





3 Ischemic cardiomyopathy with an ejection fraction 25-30%





4 Acute on chronic systolic congestive heart failure with right-sided pleural 

effusion





5 Acute on chronic hypoxemic respiratory failure secondary to above





6 History of COVID-19 pneumonia in September 2021 requiring subsequent home 

oxygen at 2 L/m





7 Chronic obstructive pulmonary disease with an FEV1 value of 45% of predicted 

and abnormal MVV





8 Morbid obesity





9 Hypertension





10 Hyperlipidemia





11 Anxiety/depression





12 Poor overall functional performance based on the above-mentioned multiple 

comorbidities





Plan:





The patient was seen and evaluated


Chest x-ray, CAT scans and labs reviewed


Pulmonary function tests reviewed


The patient carries a moderate to high risk for surgery from the pulmonary 

standpoint


This was explained in detail to the patient by Dr. Lindo


Educated regarding the use of the incentive spirometer


Been initiated on DuoNeb inhalations, Symbicort


On IV diuretics, monitoring intake and output


Heparin drip continues


Further workup for possible CABG and aVR in progress per CT services


We will continue to follow and make further recommendations based on her 

clinical status





I have personally seen and examined the patient, performed the documentation and

 the assessment and plan as written.  Number of minutes spent on the visit: 20.

## 2022-04-01 NOTE — CT
EXAMINATION TYPE: CT chest wo con

 

DATE OF EXAM: 4/1/2022

 

INDICATION: Preoperative cardiac surgery

 

CT DLP: 1459.8 mGy.cm (including CT Panorex) Automated Exposure Control for Dose Reduction was Utiliz
ed.

 

TECHNIQUE AND CONTRAST: 

CT scan of the chest without IV contrast demonstration.

 

COMPARISON: No previous CT scan is available for comparison

 

FINDINGS:

Moderate to large right-sided pleural effusion. No sizable left-sided pleural effusion. Minimal lower
 lung zone linear atelectasis and groundglass opacities, possibly related to pulmonary edema although
 associated minimal infection can't be excluded, please correlate clinically. Thickened atelectasis i
s seen in the right lung base posteriorly likely secondary to the pleural effusion. Minimal infiltrat
ion with nodular density is seen in the left lung apex. Recommend follow-up complete resolution in 6-
8 weeks.

 

Patent trachea and main bronchi. Cardiomegaly, please correlate with echocardiographic results. Scatt
ered arterial atherosclerotic calcification with aortic and mitral valve calcifications. The pulmonar
y trunk measures 3.8 cm suggestive of pulmonary hypertension. 10 mm subcarinal lymph node, possibly r
eactive. Otherwise no pathologically enlarged lymph nodes in the chest. Grossly unremarkable upper ab
domen. Degenerative changes of the thoracic spine.

 

IMPRESSION:

Cardiomegaly with suspected pulmonary edema and moderate to marked right-sided pleural effusion as de
scribed above. Associated infection can't be excluded, please correlate clinically. Other findings as
 described above. Refill on tramadol 50 mg # 90 & Alprazolam 0.5 mg # 75 called to [place], [place]. Per vo Dr Corrales

## 2022-04-01 NOTE — CT
CT scan of the mandible Panorex.

 

History preoperative valve surgery.

 

Comparison none.

 

TECHNIQUE:

Images obtained from the bottom of the mandible to the top of the frontal sinuses without contrast.

 

The mandibular ring is intact. Temporomandibular joints are intact. I see no focal bone destruction. 
There is metal artifact from dental work. The maxilla is intact. Orbital margins are intact. There is
 fairly normal aeration of the paranasal sinuses. Nasal bone is intact. There is no evidence of orbit
al blowout fracture. There is no retro-orbital mass. There is normal aeration of the mastoid sinuses.


 

IMPRESSION:

Negative CT scan of the mandible and the facial bones. No evidence of any significant periodontal dis
ease. The CT Panorex images show no significant lucency around the roots of the teeth.

## 2022-04-01 NOTE — P.GSCN
History of Present Illness


Consult date: 04/01/22


Reason for Consult: 





Radiographically, Panoramic xray shows not radiolucency in any apex, and no sign

of dental caries


Intra-oral exam shows no dental caries visible, no swelling, no sensitivity to 

percussion, no pain to pressure in buccal vestibules. Patient sees a dentist 

regularly but lapsed in the last couple years. Pt has mild gingivitis suggesting

a need to see her dentist for hygiene appt and periodic exam after she is 

medically stable.


No need for any dental work before surgery and patient is clear from dental 

infections. 


Thank you for your kind referral.





Past Medical History


Past Medical History: Heart Failure, GERD/Reflux, Hyperlipidemia, Hypertension, 

Myocardial Infarction (non Q-wave), Pneumonia (COVID-19 pneumonia in September 2021, uses 2 L of home O2 since her diagnosis of COVID-19 pneumonia.)


Additional Past Medical History / Comment(s): Pneumonia 5 years ago.  Sepsis


Last Myocardial Infarction Date:: 3/29/22


History of Any Multi-Drug Resistant Organisms: None Reported


Past Surgical History: Heart Catheterization


Additional Past Surgical History / Comment(s): Cyst removed from right ovary,.  

right ovary removed and part of the left ovary


Past Anesthesia/Blood Transfusion Reactions: No Reported Reaction


Past Psychological History: Depression


Smoking Status: Former smoker


Past Alcohol Use History: None Reported


Past Drug Use History: None Reported





- Past Family History


  ** Mother


Additional Family Medical History / Comment(s): Varicous veins.  Patient states 

"she had alot of heart problems"





  ** Father


Additional Family Medical History / Comment(s): Patient states "my dad had a lot

of heart issues."





Medications and Allergies


                                Home Medications











 Medication  Instructions  Recorded  Confirmed  Type


 


Carvedilol [Coreg] 6.25 mg PO BID 03/31/22 03/31/22 History


 


Digoxin [Lanoxin] 125 mcg PO DAILY 03/31/22 03/31/22 History


 


Furosemide [Lasix] 40 mg PO BID 03/31/22 03/31/22 History


 


Ipratropium-Albuterol Nebulize 3 ml INHALATION RT-Q6H PRN 03/31/22 03/31/22 

History





[Duoneb 0.5 mg-3 mg/3 ml Soln]    


 


Melatonin 3 mg PO HS 03/31/22 03/31/22 History


 


Pantoprazole Sodium [Protonix] 40 mg PO DAILY 03/31/22 03/31/22 History


 


Sertraline [Zoloft] 50 mg PO DAILY 03/31/22 03/31/22 History


 


Spironolactone [Aldactone] 25 mg PO DAILY 03/31/22 03/31/22 History


 


polyethylene glycoL 3350 [Miralax] 17 gm PO DAILY PRN 03/31/22 03/31/22 History








                                    Allergies











Allergy/AdvReac Type Severity Reaction Status Date / Time


 


hydralazine AdvReac  Syncope, Verified 03/31/22 15:40





   hot flashes  


 


lisinopril AdvReac  Cough Verified 03/31/22 15:40














Surgical - Exam


                                   Vital Signs











Pulse Resp


 


 85   14 


 


 03/31/22 13:25  03/31/22 13:25














Results





- Labs





                                 04/01/22 09:25





                                 04/01/22 09:25


                  Abnormal Lab Results - Last 24 Hours (Table)











  03/31/22 03/31/22 03/31/22 Range/Units





  14:23 14:54 14:54 


 


MCHC   30.9 L   (31.0-37.0)  g/dL


 


Lymphocytes #   0.6 L   (1.0-4.8)  k/uL


 


APTT    42.9 H  (22.0-30.0)  sec


 


Sodium     (137-145)  mmol/L


 


BUN     (7-17)  mg/dL


 


Glucose     (74-99)  mg/dL


 


POC Glucose (mg/dL)  133 H    (75-99)  mg/dL


 


Ur Specific Gravity     (1.001-1.035)  














  03/31/22 03/31/22 04/01/22 Range/Units





  14:54 16:08 09:25 


 


MCHC    30.2 L  (31.0-37.0)  g/dL


 


Lymphocytes #    0.6 L  (1.0-4.8)  k/uL


 


APTT     (22.0-30.0)  sec


 


Sodium  135 L    (137-145)  mmol/L


 


BUN  22 H    (7-17)  mg/dL


 


Glucose  128 H    (74-99)  mg/dL


 


POC Glucose (mg/dL)     (75-99)  mg/dL


 


Ur Specific Gravity   1.043 H   (1.001-1.035)  














  04/01/22 Range/Units





  10:30 


 


MCHC   (31.0-37.0)  g/dL


 


Lymphocytes #   (1.0-4.8)  k/uL


 


APTT  108.0 H*  (22.0-30.0)  sec


 


Sodium   (137-145)  mmol/L


 


BUN   (7-17)  mg/dL


 


Glucose   (74-99)  mg/dL


 


POC Glucose (mg/dL)   (75-99)  mg/dL


 


Ur Specific Gravity   (1.001-1.035)  








                      Microbiology - Last 24 Hours (Table)











 03/31/22 22:30 Nasal Screen MRSA/MSSA - Preliminary





 Nasal Swab 








                                 Diabetes panel











  03/31/22 04/01/22 Range/Units





  14:54 09:25 


 


Sodium  135 L   (137-145)  mmol/L


 


Potassium  3.8  4.0  (3.5-5.1)  mmol/L


 


Chloride  100   ()  mmol/L


 


Carbon Dioxide  28   (22-30)  mmol/L


 


BUN  22 H   (7-17)  mg/dL


 


Creatinine  0.65   (0.52-1.04)  mg/dL


 


Glucose  128 H   (74-99)  mg/dL


 


Calcium  8.8   (8.4-10.2)  mg/dL


 


AST  21   (14-36)  U/L


 


ALT  16   (4-34)  U/L


 


Alkaline Phosphatase  59   ()  U/L


 


Total Protein  6.4   (6.3-8.2)  g/dL


 


Albumin  3.7   (3.5-5.0)  g/dL








                                  Calcium panel











  03/31/22 Range/Units





  14:54 


 


Calcium  8.8  (8.4-10.2)  mg/dL


 


Albumin  3.7  (3.5-5.0)  g/dL








                                 Pituitary panel











  03/31/22 04/01/22 Range/Units





  14:54 09:25 


 


Sodium  135 L   (137-145)  mmol/L


 


Potassium  3.8  4.0  (3.5-5.1)  mmol/L


 


Chloride  100   ()  mmol/L


 


Carbon Dioxide  28   (22-30)  mmol/L


 


BUN  22 H   (7-17)  mg/dL


 


Creatinine  0.65   (0.52-1.04)  mg/dL


 


Glucose  128 H   (74-99)  mg/dL


 


Calcium  8.8   (8.4-10.2)  mg/dL








                                  Adrenal panel











  03/31/22 04/01/22 Range/Units





  14:54 09:25 


 


Sodium  135 L   (137-145)  mmol/L


 


Potassium  3.8  4.0  (3.5-5.1)  mmol/L


 


Chloride  100   ()  mmol/L


 


Carbon Dioxide  28   (22-30)  mmol/L


 


BUN  22 H   (7-17)  mg/dL


 


Creatinine  0.65   (0.52-1.04)  mg/dL


 


Glucose  128 H   (74-99)  mg/dL


 


Calcium  8.8   (8.4-10.2)  mg/dL


 


Total Bilirubin  1.0   (0.2-1.3)  mg/dL


 


AST  21   (14-36)  U/L


 


ALT  16   (4-34)  U/L


 


Alkaline Phosphatase  59   ()  U/L


 


Total Protein  6.4   (6.3-8.2)  g/dL


 


Albumin  3.7   (3.5-5.0)  g/dL

## 2022-04-01 NOTE — XR
EXAMINATION TYPE: XR chest 2V

 

DATE OF EXAM: 4/1/2022

 

COMPARISON: None

 

INDICATION: Precardiac surgery

 

TECHNIQUE:  Frontal and lateral views of the chest are obtained.

 

FINDINGS:  

The heart size is enlarged.  

The pulmonary vasculature is somewhat prominent.

No suspicious infiltrates or consolidation.

 

IMPRESSION:  

1. Cardiomegaly with mild prominence vascular markings. Consider volume overload.

## 2022-04-01 NOTE — P.CRDCN
History of Present Illness


History of present illness: 


HISTORY OF PRESENTING ILLNESS


Patient is a 71-year-old female with history of coronary artery disease, 

cardiomyopathy with EF 15-20%, severe aortic stenosis, moderate to severe mitral

regurgitation, systolic heart failure, hypertension, hyperlipidemia, depression,

morbid obesity, COVID-19 infection September 2021 with oxygen dependence after 

who presented to [facility] with episodes of feeling flushed and 

mildly nauseous.  She attributes much of her symptoms to changing her medication

from lisinopril to hydrochlorothiazide apparently.  She normally follows in the 

office with Dr. Haynes.  She was treated for congestive heart failure 

previously and mid March however believe she was having a reaction to the 

medications and therefore came to emergency department like urine.  Blood work 

showed elevated proBNP of 5800, hemoglobin 11.9, BUN 20, creatinine 0.8, elevat

ed troponins.  Chest x-ray showed mild vascular congestion and 2-D echo 

03/14/2022 showed severe varicose stenosis, moderate to severe mitral 

regurgitation, dilated left ventricle with an EF 15-20%.  Therefore heart 

catheterization was performed 3/31 and showed mild disease of the left and RCA 

99% stenosis which was very proximal.  She underwent attempted PCI however was 

noted to have dissection and therefore procedure was aborted.  Patient denied 

any actual chest pain or pressure during procedure and has done well since then.

 This did appear to be more of a dissection involving the aortic root appeared 

fairly mild on personal review.  Therefore patient was placed in the ICU and 

monitored.  She admits to continued shortness breath however no significant 

chest pain or similar nausea feeling.  





REVIEW OF SYSTEMS


At the time of my exam:


CONSTITUTIONAL: Denies fever or chills.


CARDIOVASCULAR: Denies chest pain, +shortness of breath, +orthopnea, no PND or 

palpitations.


RESPIRATORY: Denies cough. 


GASTROINTESTINAL: Denies abdominal pain, diarrhea, constipation, nausea or 

vomiting.


MUSCULOSKELETAL: Denies myalgias.


NEUROLOGIC: Denies numbness, tingling or weakness.


ENDOCRINE: Denies fatigue, weight change,  polydipsia or polyurina.


GENITOURINARY: Denies burning, hematuria or urgency with micturation.


HEMATOLOGIC: Denies history of anemia or bleeding. 





PHYSICAL EXAMINATION


Vital signs reviewed.


CONSTITUTIONAL: No apparent distress, obese, chronically ill appearing, +cough


HEENT: Head is normocephalic. Pupils are equal, round. Sclerae anicteric. Mucous

membranes of the mouth are moist.  +JVD. No carotid bruit.


CHEST EXAMINATION: Lungs are clear to auscultation. +crackles at bases


HEART EXAMINATION: Regular rate and rhythm. S1, S2 heard. +3/6 systolic murmur, 

no gallops or rub.


ABDOMEN: Soft, nontender. Positive bowel sounds.


EXTREMITIES: 2+ peripheral pulses, no lower extremity edema and no calf 

tenderness.


NEUROLOGIC EXAMINATION: Patient is awake, alert and oriented x3. 





ASSESSMENT


1.  Acute on chronic systolic heart failure


2.  Non-STEMI


3.  Coronary artery disease including 99% RCA stenosis status post failed 

attempted PCI


4.  Hypertension


5.  Hyperlipidemia


6.  Cardiomyopathy mainly nonischemic with CAD out of proportion to 

cardiomyopathy


7.  Severe aortic stenosis


8.  Mitral regurgitation


9.  Oxygen dependence status post COVID-19 infection





PLAN


Patient with vague symptoms with nausea and flushing sensation may be concerning

for her angina.  Some of her symptoms however appear consistent with heart 

failure and appears to be in active heart failure with JVD, orthopnea and 

crackles.  We will attempt diuresis and monitor response.  Ideally surgical 

intervention to treat her CAD, severe aortic stenosis and mitral regurgitation 

however if deemed high risk may consider repeat intervention.  Currently appears

stable and we will place her on heparin drip and monitor.  Optimize heart 

failure regimen as able.











Past Medical History


Past Medical History: Heart Failure, GERD/Reflux, Hyperlipidemia, Hypertension, 

Myocardial Infarction (non Q-wave), Pneumonia (COVID-19 pneumonia in September 2021, uses 2 L of home O2 since her diagnosis of COVID-19 pneumonia.)


Additional Past Medical History / Comment(s): Pneumonia 5 years ago.  Sepsis


Last Myocardial Infarction Date:: 3/29/22


History of Any Multi-Drug Resistant Organisms: None Reported


Past Surgical History: Heart Catheterization


Additional Past Surgical History / Comment(s): Cyst removed from right ovary,.  

right ovary removed and part of the left ovary


Past Anesthesia/Blood Transfusion Reactions: No Reported Reaction


Past Psychological History: Depression


Smoking Status: Former smoker


Past Alcohol Use History: None Reported


Past Drug Use History: None Reported





- Past Family History


  ** Mother


Additional Family Medical History / Comment(s): Varicous veins.  Patient states 

"she had alot of heart problems"





  ** Father


Additional Family Medical History / Comment(s): Patient states "my dad had a lot

of heart issues."





Medications and Allergies


                                Home Medications











 Medication  Instructions  Recorded  Confirmed  Type


 


Carvedilol [Coreg] 6.25 mg PO BID 03/31/22 03/31/22 History


 


Digoxin [Lanoxin] 125 mcg PO DAILY 03/31/22 03/31/22 History


 


Furosemide [Lasix] 40 mg PO BID 03/31/22 03/31/22 History


 


Ipratropium-Albuterol Nebulize 3 ml INHALATION RT-Q6H PRN 03/31/22 03/31/22 Hi

story





[Duoneb 0.5 mg-3 mg/3 ml Soln]    


 


Melatonin 3 mg PO HS 03/31/22 03/31/22 History


 


Pantoprazole Sodium [Protonix] 40 mg PO DAILY 03/31/22 03/31/22 History


 


Sertraline [Zoloft] 50 mg PO DAILY 03/31/22 03/31/22 History


 


Spironolactone [Aldactone] 25 mg PO DAILY 03/31/22 03/31/22 History


 


polyethylene glycoL 3350 [Miralax] 17 gm PO DAILY PRN 03/31/22 03/31/22 History








                                    Allergies











Allergy/AdvReac Type Severity Reaction Status Date / Time


 


hydralazine AdvReac  Syncope, Verified 03/31/22 15:40





   hot flashes  


 


lisinopril AdvReac  Cough Verified 03/31/22 15:40














Physical Exam


Vitals: 


                                   Vital Signs











  Temp Pulse Pulse Resp BP BP Pulse Ox


 


 04/01/22 07:52        97


 


 04/01/22 07:00   71   16  83/66   98


 


 04/01/22 06:30   71   19  83/66   96


 


 04/01/22 06:00   76   14  110/77   97


 


 04/01/22 05:00   85   16  96/59   97


 


 04/01/22 04:30   72   19  96/61   97


 


 04/01/22 04:00  97.9 F  70   20  103/70   96


 


 04/01/22 03:30   76   19  101/66   97


 


 04/01/22 03:00   78   19  104/71   97


 


 04/01/22 02:30   85   13  107/74   97


 


 04/01/22 02:00   82   19  110/71   97


 


 04/01/22 01:30   80   15  95/81   97


 


 04/01/22 01:00   97   19  87/62   94 L


 


 04/01/22 00:30   70   19    97


 


 04/01/22 00:06   79   21  102/67   99


 


 04/01/22 00:00  98.0 F  77   34 H  97/65   98


 


 03/31/22 23:30   80   22  94/54   97


 


 03/31/22 23:00   80   12  97/40   96


 


 03/31/22 22:30   80   15  94/62   96


 


 03/31/22 22:00   80   14  86/57   96


 


 03/31/22 21:30   78   19  95/57   97


 


 03/31/22 21:00   80   19  102/64   97


 


 03/31/22 20:30   85   18  99/83   97


 


 03/31/22 20:00  98.0 F  78   16  81/51   98


 


 03/31/22 19:30   82   15  92/51   97


 


 03/31/22 19:00   75   14  94/64   97


 


 03/31/22 18:30   86   11 L  105/54   96


 


 03/31/22 18:00   89   24  107/56   99


 


 03/31/22 17:30   86   18  98/62   98


 


 03/31/22 17:00   84   22  98/62   97


 


 03/31/22 16:30   81   19  86/67   95


 


 03/31/22 16:00  98.2 F  82   22  97/49   91 L


 


 03/31/22 15:30   87   14  95/66   91 L


 


 03/31/22 15:00   80   11 L  100/61   92 L


 


 03/31/22 14:30   76   15  100/63   94 L


 


 03/31/22 14:00   89   16  100/63   94 L


 


 03/31/22 13:50   84   13    93 L


 


 03/31/22 13:40   87   14   


 


 03/31/22 13:30   92  90  16   109/70  94 L


 


 03/31/22 13:25   85   14   








                                Intake and Output











 03/31/22 04/01/22 04/01/22





 22:59 06:59 14:59


 


Intake Total 400 630 


 


Output Total 500 275 


 


Balance -100 355 


 


Intake:   


 


   450 


 


    Sodium Chloride 0.9% 1, 400 450 





    000 ml @ 50 mls/hr IV .   





    Q20H [facility] Rx#:[id number]   


 


  Oral  180 


 


Output:   


 


  Urine 500 275 


 


Other:   


 


  Voiding Method External Catheter External Catheter 


 


  # Voids 0  


 


  Weight 107 kg 106.8 kg 














Results





                                 03/31/22 14:54





                                 03/31/22 14:54


                                 Cardiac Enzymes











  03/31/22 Range/Units





  14:54 


 


AST  21  (14-36)  U/L








                                   Coagulation











  03/31/22 Range/Units





  14:54 


 


PT  11.3  (9.0-12.0)  sec


 


APTT  42.9 H  (22.0-30.0)  sec








                                       CBC











  03/31/22 Range/Units





  14:54 


 


WBC  5.7  (3.8-10.6)  k/uL


 


RBC  4.44  (3.80-5.40)  m/uL


 


Hgb  12.5  (11.4-16.0)  gm/dL


 


Hct  40.6  (34.0-46.0)  %


 


Plt Count  153  (150-450)  k/uL








                          Comprehensive Metabolic Panel











  03/31/22 Range/Units





  14:54 


 


Sodium  135 L  (137-145)  mmol/L


 


Potassium  3.8  (3.5-5.1)  mmol/L


 


Chloride  100  ()  mmol/L


 


Carbon Dioxide  28  (22-30)  mmol/L


 


BUN  22 H  (7-17)  mg/dL


 


Creatinine  0.65  (0.52-1.04)  mg/dL


 


Glucose  128 H  (74-99)  mg/dL


 


Calcium  8.8  (8.4-10.2)  mg/dL


 


AST  21  (14-36)  U/L


 


ALT  16  (4-34)  U/L


 


Alkaline Phosphatase  59  ()  U/L


 


Total Protein  6.4  (6.3-8.2)  g/dL


 


Albumin  3.7  (3.5-5.0)  g/dL








                               Current Medications











Generic Name Dose Route Start Last Admin





  Trade Name Freq  PRN Reason Stop Dose Admin


 


Aspirin  81 mg  04/01/22 09:00 





  Aspirin 81 Mg  PO  





  DAILY [facility]  


 


Atorvastatin Calcium  40 mg  04/01/22 21:00 





  Atorvastatin 40 Mg Tab  PO  





  HS [facility]  


 


Carvedilol  6.25 mg  04/01/22 08:15 





  Carvedilol 6.25 Mg Tab  PO  





  BID-W/MEALS [facility]  


 


Digoxin  125 mcg  04/01/22 09:00 





  Digoxin 125 Mcg Tab  PO  





  DAILY [facility]  


 


Furosemide  40 mg  04/01/22 09:00 





  Furosemide 10 Mg/Ml 4 Ml Vial  IV  





  Q12HR [facility]  


 


Heparin Sodium (Porcine)  0 unit  04/01/22 07:54 





  Heparin Sodium 1,000 Un/Ml (10ml Vl)  IV  





  PER PROTOCOL PRN  





  Low PTT  





  Protocol  


 


Sodium Chloride  1,000 mls @ 20 mls/hr  03/31/22 13:45  03/31/22 13:59





  Saline 0.9%  IV   50 mls/hr





  .Q24H [facility]   Administration


 


Heparin Sodium/Sodium Chloride  250 mls @ 19.224 mls/hr  04/01/22 08:30 





  25,000 unit/ Sodium Chloride  IV  





  .Q13H1M [facility]  





  Protocol  





  18 UNITS/KG/HR  


 


Melatonin  3 mg  04/01/22 21:00 





  Melatonin 3 Mg Tablet  PO  





  HS [facility]  


 


Miscellaneous Information  1 each  03/31/22 21:16 





  Potassium Replacement Protocol 1 Each Misc  MISCELLANE  





  DAILY PRN  





  Per Protocol  





  Protocol  


 


Pantoprazole Sodium  40 mg  04/01/22 09:00 





  Pantoprazole 40 Mg/10 Ml Vial  IVP  





  DAILY [facility]  


 


Polyethylene Glycol  17 gm  04/01/22 08:00 





  Polyethylene Glycol 3350 17 Gm Powd.Pack  PO  





  DAILY PRN  





  Constipation  


 


Sertraline HCl  50 mg  04/01/22 09:00 





  Sertraline 50 Mg Tab  PO  





  DAILY [facility]  


 


Spironolactone  25 mg  04/01/22 09:00 





  Spironolactone 25 Mg Tab  PO  





  DAILY [facility]  








                                Intake and Output











 03/31/22 04/01/22 04/01/22





 22:59 06:59 14:59


 


Intake Total 400 630 


 


Output Total 500 275 


 


Balance -100 355 


 


Intake:   


 


   450 


 


    Sodium Chloride 0.9% 1, 400 450 





    000 ml @ 50 mls/hr IV .   





    Q20H [facility] Rx#:[id number]   


 


  Oral  180 


 


Output:   


 


  Urine 500 275 


 


Other:   


 


  Voiding Method External Catheter External Catheter 


 


  # Voids 0  


 


  Weight 107 kg 106.8 kg 








                                        





                                 03/31/22 14:54 





                                 03/31/22 14:54

## 2022-04-02 LAB
ANION GAP SERPL CALC-SCNC: 4 MMOL/L
BASOPHILS # BLD AUTO: 0.1 K/UL (ref 0–0.2)
BASOPHILS NFR BLD AUTO: 1 %
BUN SERPL-SCNC: 22 MG/DL (ref 7–17)
CALCIUM SPEC-MCNC: 8.6 MG/DL (ref 8.4–10.2)
CHLORIDE SERPL-SCNC: 99 MMOL/L (ref 98–107)
CO2 SERPL-SCNC: 32 MMOL/L (ref 22–30)
EOSINOPHIL # BLD AUTO: 0.1 K/UL (ref 0–0.7)
EOSINOPHIL NFR BLD AUTO: 1 %
ERYTHROCYTE [DISTWIDTH] IN BLOOD BY AUTOMATED COUNT: 4.23 M/UL (ref 3.8–5.4)
ERYTHROCYTE [DISTWIDTH] IN BLOOD: 14 % (ref 11.5–15.5)
GLUCOSE SERPL-MCNC: 126 MG/DL (ref 74–99)
HCT VFR BLD AUTO: 39.5 % (ref 34–46)
HGB BLD-MCNC: 12.1 GM/DL (ref 11.4–16)
LYMPHOCYTES # SPEC AUTO: 0.8 K/UL (ref 1–4.8)
LYMPHOCYTES NFR SPEC AUTO: 11 %
MAGNESIUM SPEC-SCNC: 2.1 MG/DL (ref 1.6–2.3)
MCH RBC QN AUTO: 28.6 PG (ref 25–35)
MCHC RBC AUTO-ENTMCNC: 30.6 G/DL (ref 31–37)
MCV RBC AUTO: 93.2 FL (ref 80–100)
MONOCYTES # BLD AUTO: 0.5 K/UL (ref 0–1)
MONOCYTES NFR BLD AUTO: 7 %
NEUTROPHILS # BLD AUTO: 5.7 K/UL (ref 1.3–7.7)
NEUTROPHILS NFR BLD AUTO: 77 %
PLATELET # BLD AUTO: 125 K/UL (ref 150–450)
POTASSIUM SERPL-SCNC: 3.7 MMOL/L (ref 3.5–5.1)
SODIUM SERPL-SCNC: 135 MMOL/L (ref 137–145)
WBC # BLD AUTO: 7.4 K/UL (ref 3.8–10.6)

## 2022-04-02 RX ADMIN — BUDESONIDE AND FORMOTEROL FUMARATE DIHYDRATE SCH PUFF: 160; 4.5 AEROSOL RESPIRATORY (INHALATION) at 11:38

## 2022-04-02 RX ADMIN — IPRATROPIUM BROMIDE AND ALBUTEROL SULFATE SCH ML: .5; 3 SOLUTION RESPIRATORY (INHALATION) at 11:38

## 2022-04-02 RX ADMIN — ASPIRIN 81 MG CHEWABLE TABLET SCH MG: 81 TABLET CHEWABLE at 10:23

## 2022-04-02 RX ADMIN — ACETAMINOPHEN PRN MG: 325 TABLET, FILM COATED ORAL at 20:06

## 2022-04-02 RX ADMIN — IPRATROPIUM BROMIDE AND ALBUTEROL SULFATE SCH: .5; 3 SOLUTION RESPIRATORY (INHALATION) at 16:57

## 2022-04-02 RX ADMIN — DIGOXIN SCH MCG: 125 TABLET ORAL at 10:21

## 2022-04-02 RX ADMIN — IPRATROPIUM BROMIDE AND ALBUTEROL SULFATE SCH ML: .5; 3 SOLUTION RESPIRATORY (INHALATION) at 21:09

## 2022-04-02 RX ADMIN — HEPARIN SODIUM SCH: 10000 INJECTION, SOLUTION INTRAVENOUS at 00:41

## 2022-04-02 RX ADMIN — BUDESONIDE AND FORMOTEROL FUMARATE DIHYDRATE SCH PUFF: 160; 4.5 AEROSOL RESPIRATORY (INHALATION) at 21:09

## 2022-04-02 RX ADMIN — FUROSEMIDE SCH MG: 10 INJECTION, SOLUTION INTRAMUSCULAR; INTRAVENOUS at 20:06

## 2022-04-02 RX ADMIN — CEFAZOLIN SCH: 330 INJECTION, POWDER, FOR SOLUTION INTRAMUSCULAR; INTRAVENOUS at 10:24

## 2022-04-02 RX ADMIN — HEPARIN SODIUM SCH MLS/HR: 10000 INJECTION, SOLUTION INTRAVENOUS at 18:35

## 2022-04-02 RX ADMIN — ATORVASTATIN CALCIUM SCH MG: 40 TABLET, FILM COATED ORAL at 20:06

## 2022-04-02 RX ADMIN — HEPARIN SODIUM SCH MLS/HR: 10000 INJECTION, SOLUTION INTRAVENOUS at 00:12

## 2022-04-02 RX ADMIN — HEPARIN SODIUM SCH: 10000 INJECTION, SOLUTION INTRAVENOUS at 23:43

## 2022-04-02 RX ADMIN — FUROSEMIDE SCH: 10 INJECTION, SOLUTION INTRAMUSCULAR; INTRAVENOUS at 11:55

## 2022-04-02 RX ADMIN — SERTRALINE HYDROCHLORIDE SCH MG: 50 TABLET, FILM COATED ORAL at 10:21

## 2022-04-02 RX ADMIN — PANTOPRAZOLE SODIUM SCH MG: 40 INJECTION, POWDER, FOR SOLUTION INTRAVENOUS at 10:21

## 2022-04-02 RX ADMIN — IPRATROPIUM BROMIDE AND ALBUTEROL SULFATE SCH ML: .5; 3 SOLUTION RESPIRATORY (INHALATION) at 07:57

## 2022-04-02 RX ADMIN — SPIRONOLACTONE SCH MG: 25 TABLET, FILM COATED ORAL at 10:21

## 2022-04-02 NOTE — P.PN
Subjective


Progress Note Date: 04/02/22





This is a pleasant 71-year-old female patient who has a history of hypertension,

hyperlipidemia, anxiety/depression, obesity, previous chronic tobacco 

dependence, chronic hypoxic respiratory failure from a COVID-19 pneumonia in 

2021.  Remains unvaccinated.  She also has a previous non-ST segment elevation 

myocardial infarction, cardiomyopathy with ejection fraction 25-30%, severe 

aortic stenosis.  She was recently admitted to [facility] for 

dizziness and nausea and had undergone cardiac catheterization which revealed a 

99% stenosis of the ostial RCA and transferred here for intervention.  

Unfortunately the lesion was too tight to cross and the procedure was aborted.  

She is being evaluated by surgical services for possible single-vessel bypass 

and aortic valve replacement.  She is seen today in consultation in the 

intensive care unit and his pacing possible surgery.  She is currently sitting 

up in bed.  Awake and alert.  Anxious, teary-eyed.  She was found to have an 

FEV1 value of 45% of predicted.  Currently, she denies any worsening shortness 

of breath, cough or congestion.  She is maintaining O2 saturations in the mid 

90s on 2 L/m per nasal cannula.  She's been afebrile.  She has normal saline 

running at 20 ML's per hour.  She remains on a heparin drip per weight base 

protocol.  Computed tomography scan of the chest reveals moderate right-sided 

pleural effusion with some fluid in the fissure.  White count 7.1.  Hemoglobin 

12.8.  Sodium 135.  Potassium 4.0.  BUN 22.  Creatinine 0.65.  We'll switch 28. 

AST 21.  ALT 16.  Urinalysis negative.  Hepatitis screen negative.





The patient is seen today 04/02/2022 in follow-up in the ICU.  She is currently 

sitting up in bed.  Awake and alert in no acute distress.  Maintaining O2 

saturations in the 90s on 2 L/m per nasal cannula.  She has normal saline 

running 20 miles per hour.  Heparin per weight-based protocol.  She currently 

denies any worsening shortness of breath, cough or congestion.  No chest pain or

palpitations.  White count 7.4.  Hemoglobin 12.1.  Platelets 125.  Sodium 135.  

Potassium 3.7.  Bicarb 32.  BUN 22.  Creatinine 0.61.  She is continued on 

DuoNeb inhalations, Symbicort.  She remains on IV diuretics.  Currently in a -

1.1 L balance.





Objective





- Vital Signs


Vital signs: 


                                   Vital Signs











Temp  98.0 F   04/02/22 12:00


 


Pulse  73   04/02/22 13:00


 


Resp  20   04/02/22 13:00


 


BP  100/66   04/02/22 13:00


 


Pulse Ox  97   04/02/22 13:00








                                 Intake & Output











 04/01/22 04/02/22 04/02/22





 18:59 06:59 18:59


 


Intake Total 342.660 385.265 257.338


 


Output Total 750 1150 150


 


Balance -407.340 -764.735 107.338


 


Weight  107.7 kg 


 


Intake:   


 


   240 140


 


    Sodium Chloride 0.9% 1, 220 240 140





    000 ml @ 20 mls/hr IV .   





    Q24H [facility] Rx#:[id number]   


 


  Intake, IV Titration 122.660 145.265 117.338





  Amount   


 


    Heparin Sod,Pork in 0.45% 122.660 145.265 117.338





    NaCl 25,000 unit In 0.45   





    % NaCl 1 250ml.bag @ 18   





    UNITS/KG/HR 19.224 mls/hr   





    IV .Q13H1M [facility] Rx#:   





    [id number]   


 


Output:   


 


  Urine 750 1150 150


 


  Stool  0 


 


Other:   


 


  Voiding Method Incontinent Incontinent Incontinent





 External Catheter External Catheter External Catheter


 


  # Voids 0  








                       ABP, PAP, CO, CI - Last Documented











Arterial Blood Pressure        107/66

















- Exam





GENERAL EXAM: Alert, anxious, 71-year-old female, on 2 L nasal cannula, 

comfortable in no apparent distress.


HEAD: Normocephalic.


EYES: Normal reaction of pupils, equal size.


NOSE: Clear with pink turbinates.


THROAT: No erythema or exudates.


NECK: No masses, no JVD.


CHEST: No chest wall deformity.


LUNGS: Equal air entry with crackles in the right posterior base.


CVS: S1 and S2 normal with no audible murmur, regular rhythm.


ABDOMEN: No hepatosplenomegaly, normal bowel sounds, no guarding or rigidity.


SPINE: No scoliosis or deformity


SKIN: No rashes


CENTRAL NERVOUS SYSTEM: No focal deficits, tone is normal in all 4 extremities.


EXTREMITIES: There is no peripheral edema.  No clubbing, no cyanosis.  

Peripheral pulses are intact.





- Labs


CBC & Chem 7: 


                                 04/02/22 07:29





                                 04/02/22 07:29


Labs: 


                  Abnormal Lab Results - Last 24 Hours (Table)











  04/02/22 04/02/22 04/02/22 Range/Units





  00:07 07:29 07:29 


 


MCHC   30.6 L   (31.0-37.0)  g/dL


 


Plt Count   125 L   (150-450)  k/uL


 


Lymphocytes #   0.8 L   (1.0-4.8)  k/uL


 


APTT  >200.0 H*    (22.0-30.0)  sec


 


Sodium    135 L  (137-145)  mmol/L


 


Carbon Dioxide    32 H  (22-30)  mmol/L


 


BUN    22 H  (7-17)  mg/dL


 


Glucose    126 H  (74-99)  mg/dL














  04/02/22 Range/Units





  07:29 


 


MCHC   (31.0-37.0)  g/dL


 


Plt Count   (150-450)  k/uL


 


Lymphocytes #   (1.0-4.8)  k/uL


 


APTT  192.0 H*  (22.0-30.0)  sec


 


Sodium   (137-145)  mmol/L


 


Carbon Dioxide   (22-30)  mmol/L


 


BUN   (7-17)  mg/dL


 


Glucose   (74-99)  mg/dL








                      Microbiology - Last 24 Hours (Table)











 03/31/22 22:30 Nasal Screen MRSA/MSSA - Final





 Nasal Swab 














Assessment and Plan


Assessment: 





1 Non-ST segment elevation myocardial infarction in a patient found to have a 

99% stenosis of the ostial RCA with unsuccessful PCI.





2 Severe aortic stenosis





3 Ischemic cardiomyopathy with an ejection fraction 25-30%





4 Acute on chronic systolic congestive heart failure with right-sided pleural 

effusion





5 Acute on chronic hypoxemic respiratory failure secondary to above





6 History of COVID-19 pneumonia in September 2021 requiring subsequent home 

oxygen at 2 L/m





7 Chronic obstructive pulmonary disease with an FEV1 value of 45% of predicted 

and abnormal MVV





8 Morbid obesity





9 Hypertension





10 Hyperlipidemia





11 Anxiety/depression





12 Poor overall functional performance based on the above-mentioned multiple 

comorbidities





Plan:





The patient was seen and evaluated


Labs reviewed


Continue DuoNeb inhalations, Symbicort


Continue IV diuretics, monitoring intake and output


Heparin drip continues


Possible CABG x 1 and TAVR versus another attempt at PCI of the RCA


We will continue to follow and make further recommendations based on her 

clinical status





I have personally seen and examined the patient, performed the documentation and

the assessment and plan as written.  Number of minutes spent on the visit: 10.

## 2022-04-02 NOTE — P.PN
Subjective


Progress Note Date: 04/02/22





HISTORY OF PRESENT ILLNESS


This is a 71-year-old female with past medical history of dilated cardiomyopathy

with ejection fraction of 15-20%, severe aortic valve stenosis, mild aortic 

regurgitation with moderate to severe mitral regurgitation, chronic systolic 

heart failure, hyperlipidemia, obesity with possible obstructive sleep apnea and

obesity hypoventilation syndrome, chronic hypoxic respiratory failure on home O2

at 2 L nasal cannula, hypertension hypertensive cardio vascular disease, 

previous Covid 19 infection.  Patient presented to [facility] 

slight elevation of troponin, EKG showed changes suggestive ischemic changes and

was started on aspirin, heparin drip and admitted to the hospital.  Patient was 

seen by cardiology. She was supposed to have a heart catheterization done as an 

outpatient along with [facility] for possible aortic valve replacement and mitral valve

and possible CABG.  Patient underwent coronary angiography finding right 

dominant vessel, 99% ostial stenosis, aortic pressure is 100/70, 40 mm gradient 

across the aortic valve.  Left ventricular end diastolic pressure is 2530.  

Patient was transferred to [facility] for PTCA and stent 

placement of the RCA which was unsuccessful.  Consult with cardiothoracic 

surgery for CABG and valve repair/replacement.





4/1: Patient is seen today in the intensive care unit.  She denies chest pain or

shortness of breath. She complains of anxiety and decreased appetite. Less cough

today.She has been seen by intensivist and cardiothoracic surgery, currently on 

heparin drip, consult was added for dental evaluation and clearance and 

panoramic CT.  Patient has been afebrile, heart rate in the 80s, blood pressure 

101/73, pulse ox 96%.  Repeat blood work reveals CBC is unremarkable.  Potassium

4.0.  Urinalysis negative for infection.  Hepatitis panel negative.  MRSA nasal 

screen is in process.  CT of the chest revealed cardiomegaly with suspected 

pulmonary edema and moderate to marked right sided pleural effusion.  Associated

infection can't be excluded.


Echocardiogram reveals EF of 25-30% with mild concentric left ventricular 

hypertrophy, severe global hypokinesia of the LV, severe aortic stenosis with 

gradient 78.27 mm a new 3/47.94 mmHg, trace mitral regurgitation, mild mitral 

stenosis, mild tricuspid regurgitation, mild pulmonary hypertension, RVSP 43.33 

mmHg.


Carotid ultrasound revealed less than 50% stenosis bilateral carotid systems.





4/2: Patient is sitting up in bed she underwent CTA of the chest for evaluation 

of dissection of the right coronary artery, she denies any chest pain at this 

time, she has no shortness breath, she has no abdominal pain, nausea or 

vomiting, she continues to have some coughing noted from production, she 

continues to be somewhat depressed and anxious and she is not sure what to do, 

she is missing her daughter she stated and she doesn't think her daughter is 

able to come and see her.








REVIEW OF SYSTEMS


Constitutional: No fever, no chills, no night sweats.  No weight change.  No 

weakness, fatigue or lethargy.  No daytime sleepiness.


HEENT: No headache.  No blurred vision or double vision, no loss of vision.  No 

loss of Hearing, no ringing in the ears, no dizziness.  No nasal drainage or 

congestion.  No epistaxis.  No sore throat.


Lungs: Positive for shortness of breath, positive for cough, no sputum 

production.  No wheezing.  Reports dyspnea on exertion.


Cardiovascular: No chest pain, no lower extremity edema.  No palpitations.  No 

paroxysmal nocturnal dyspnea.  No orthopnea.  No lightheadedness or dizziness.  

No syncopal episodes.


Abdominal: No abdominal pain.  No nausea, vomiting.  No diarrhea.  No 

constipation.  No bloody or tarry stools.  Reports loss of appetite.


Genitourinary: No dysuria, increased frequency, urgency.  No urinary retention.


Musculoskeletal: No myalgias.  No muscle weakness, no gait dysfunction, no 

frequent falls.  No back pain.  No neck pain.


Integumentary: No wounds, no lesions.  No rash or pruritus.  Positive for 

bruising.  No change in hair or nails.


Neurologic: No aphasia. No facial droop. No change in mentation. No head injury.

No headache. No paralysis. No paresthesia.


Psychiatric: No depression.  Reports anxiety.  No mood swings.


Endocrine: No abnormal blood sugars.  No weight change.  No excessive sweating 

or thirst.  No cold intolerance.  





PHYSICAL EXAMINATION


Gen: This is a 71-year-old  female.


HEENT: Head is atraumatic, normocephalic. Pupils equal, round. Sclerae is 

anicteric. 


NECK: Supple. No JVD. No lymphadenopathy. No thyromegaly. 


LUNGS: Decreased breath sounds at the bases, decreased tactile fremitus at the 

right lower third, few rhonchi, no expiratory wheeze, no chest wall tenderness. 

No intercostal retractions.


HEART: First heart sound is depressed, second heart sound is normal, systolic 

ejection murmur 3/6 at the right upper sternal border radiating to the base of 

the neck, systolic ejection murmur 2/6 at the apex rating to the axilla.


ABDOMEN: Soft,, nontender, nondistended, positive bowel sounds, multiple 

ecchymosis.


EXTREMITIES: 1+ pedal edema.  No calf tenderness.  Her cells pedis +1 

bilaterally.  Bilateral hammertoes.


NEUROLOGICAL: Patient is awake, alert and oriented x3. Cranial nerves 2 through 

12 are grossly intact.,  Cranial nerves II-12 appear grossly intact, muscle 

power 4 out of 5 in upper and lower extremities bilaterally.





ASSESSMENT AND PLAN





1.   Non ST elevation  myocardial infarction with history of prior dilated 

cardiomyopathy status post coronary angiography finding right dominant vessel, 

99% ostial stenosis, aortic pressure is 100/70, 40 mm gradient across the aortic

valve.  Left ventricular end diastolic pressure is 2530.  Patient was 

transferred to [facility] for PTCA and stent placement of the 

RCA which was unsuccessful.  Consult with cardiothoracic surgery appreciated.  

Intensivist consultation appreciated..  Continue aspirin 81 mg daily, on Coreg 

6.25 mg twice daily.  Patient is unable to tolerate statins because of 

significant myopathy and plan is to start her on Repatha under 40 mg subcu every

2 weeks.  Dental evaluation with panoramic CT.





2.  Dilated cardiomyopathy.  continue patient on carvedilol 6.25 minute gram 

orally twice every day, continue Lasix 40 mg IV push every 12 hours, continue 

with monitoring input and output and daily weight.





3.  Severe aortic valve stenosis with mild aortic regurgitation.  Patient will 

require aortic valve replacement versus TAVR.





4.    Moderate to Severe mitral regurgitation. likely will continue with 

conservative management no plan for mitral valve repair.  





5.  Hyperlipidemia.  Patient unable to tolerate statins. patient needs to be sta

rted on Repatha 140 mg subcu test every 12 hours.  





6.  Obesity with obstructive sleep apnea and obesity hypoventilation syndrome.  

Patient has not had sleep study done yet. 





7.  Chronic hypoxic respiratory failure secondary to his Covid 19 and underlying

COPD and possible obstructive sleep apnea and hypoventilation syndrome.  Patient

is normally on 2 L nasal cannula.





8.  Acute on chronic systolic heart failure.  Continue Lasix 40 mg IV every 12 

hours, Coreg, Aldactone 25 mg daily.  





9.  Hypertension, hypertensive cardiovascular disease. continue carvedilol 6.25 

mg orally twice every day.  





10.  Recurrent depression and generalized anxiety disorder.  Continue Zoloft 100

mg orally once every day.  


 


11.  DVT prophylaxis.  continue patient on heparin drip.   





12.  GI  prophylaxis.  Protonix 40 mg IV push daily.





13.  Overall prognosis is very guarded.  








Objective





- Vital Signs


Vital signs: 


                                   Vital Signs











Temp  98.0 F   04/02/22 12:00


 


Pulse  73   04/02/22 13:00


 


Resp  20   04/02/22 13:00


 


BP  100/66   04/02/22 13:00


 


Pulse Ox  97   04/02/22 13:00








                                 Intake & Output











 04/01/22 04/02/22 04/02/22





 18:59 06:59 18:59


 


Intake Total 342.660 385.265 257.338


 


Output Total 750 1150 150


 


Balance -407.340 -764.735 107.338


 


Weight  107.7 kg 


 


Intake:   


 


   240 140


 


    Sodium Chloride 0.9% 1, 220 240 140





    000 ml @ 20 mls/hr IV .   





    Q24H [facility] Rx#:[id number]   


 


  Intake, IV Titration 122.660 145.265 117.338





  Amount   


 


    Heparin Sod,Pork in 0.45% 122.660 145.265 117.338





    NaCl 25,000 unit In 0.45   





    % NaCl 1 250ml.bag @ 18   





    UNITS/KG/HR 19.224 mls/hr   





    IV .Q13H1M [facility] Rx#:   





    [id number]   


 


Output:   


 


  Urine 750 1150 150


 


  Stool  0 


 


Other:   


 


  Voiding Method Incontinent Incontinent Incontinent





 External Catheter External Catheter External Catheter


 


  # Voids 0  








                       ABP, PAP, CO, CI - Last Documented











Arterial Blood Pressure        107/66

















- Labs


CBC & Chem 7: 


                                 04/02/22 07:29





                                 04/02/22 07:29


Labs: 


                  Abnormal Lab Results - Last 24 Hours (Table)











  04/02/22 04/02/22 04/02/22 Range/Units





  00:07 07:29 07:29 


 


MCHC   30.6 L   (31.0-37.0)  g/dL


 


Plt Count   125 L   (150-450)  k/uL


 


Lymphocytes #   0.8 L   (1.0-4.8)  k/uL


 


APTT  >200.0 H*    (22.0-30.0)  sec


 


Sodium    135 L  (137-145)  mmol/L


 


Carbon Dioxide    32 H  (22-30)  mmol/L


 


BUN    22 H  (7-17)  mg/dL


 


Glucose    126 H  (74-99)  mg/dL














  04/02/22 Range/Units





  07:29 


 


MCHC   (31.0-37.0)  g/dL


 


Plt Count   (150-450)  k/uL


 


Lymphocytes #   (1.0-4.8)  k/uL


 


APTT  192.0 H*  (22.0-30.0)  sec


 


Sodium   (137-145)  mmol/L


 


Carbon Dioxide   (22-30)  mmol/L


 


BUN   (7-17)  mg/dL


 


Glucose   (74-99)  mg/dL








                      Microbiology - Last 24 Hours (Table)











 03/31/22 22:30 Nasal Screen MRSA/MSSA - Final





 Nasal Swab

## 2022-04-02 NOTE — P.PN
Subjective





HISTORY OF PRESENTING ILLNESS


Patient is a 71-year-old female with history of coronary artery disease, 

cardiomyopathy with EF 15-20%, severe aortic stenosis, moderate to severe mitral

regurgitation, systolic heart failure, hypertension, hyperlipidemia, depression,

morbid obesity, COVID-19 infection September 2021 with oxygen dependence after 

who presented to [facility] with episodes of feeling flushed and 

mildly nauseous.  She attributes much of her symptoms to changing her medication

from lisinopril to hydrochlorothiazide apparently.  She normally follows in the 

office with Dr. Haynes.  She was treated for congestive heart failure 

previously and mid March however believe she was having a reaction to the 

medications and therefore came to emergency department like urine.  Blood work 

showed elevated proBNP of 5800, hemoglobin 11.9, BUN 20, creatinine 0.8, 

elevated troponins.  Chest x-ray showed mild vascular congestion and 2-D echo 

03/14/2022 showed severe varicose stenosis, moderate to severe mitral 

regurgitation, dilated left ventricle with an EF 15-20%.  Therefore heart 

catheterization was performed 3/31 and showed mild disease of the left and RCA 

99% stenosis which was very proximal.  She underwent attempted PCI however was 

noted to have dissection and therefore procedure was aborted.  Patient denied 

any actual chest pain or pressure during procedure and has done well since then.

 This did appear to be more of a dissection involving the aortic root appeared 

fairly mild on personal review.  Therefore patient was placed in the ICU and 

monitored.  She admits to continued shortness breath however no significant 

chest pain or similar nausea feeling.  





4/2


Patient seen and examined.  Patient denies any chest pain or pressure.  Denies 

any of the nausea type symptoms that brought her into the hospital.  Still 

coughing somewhat.  Has been diuresing with good urine output.  





PHYSICAL EXAMINATION


Vital signs reviewed.


CONSTITUTIONAL: No apparent distress, obese, chronically ill appearing, +cough


HEENT: Head is normocephalic. Pupils are equal, round. Sclerae anicteric. Mucous

membranes of the mouth are moist.   No carotid bruit.


CHEST EXAMINATION: Lungs are clear to auscultation. 


HEART EXAMINATION: Regular rate and rhythm. S1, S2 heard. +3/6 systolic murmur, 

no gallops or rub.


ABDOMEN: Soft, nontender. Positive bowel sounds.


EXTREMITIES: 2+ peripheral pulses, no lower extremity edema and no calf 

tenderness.


NEUROLOGIC EXAMINATION: Patient is awake, alert and oriented x3. 





ASSESSMENT


1.  Acute on chronic systolic heart failure


2.  Non-STEMI


3.  Coronary artery disease including 99% RCA stenosis status post failed 

attempted PCI


4.  Hypertension


5.  Hyperlipidemia


6.  Cardiomyopathy mainly nonischemic with CAD out of proportion to 

cardiomyopathy


7.  Severe aortic stenosis


8.  Mitral regurgitation


9.  Oxygen dependence status post COVID-19 infection





PLAN


Continue with heparin drip and optimize heart failure regimen as able.  Blood 

pressures have been borderline in the systolics 90s to 100 100s.  She has been 

diuresing well with creatinine stable.  Discussed with surgeons and we will 

check a CTA to evaluate the aortic root and evaluate for any dissection however 

clinically has been doing well.  Additionally check CTA TAVR protocol if able to

assess if she would be a good candidate for TAVR to attempt to further risk 

stratify patient for intervention/surgery.








Objective





- Vital Signs


Vital signs: 


                                   Vital Signs











Temp  97.7 F   04/02/22 04:00


 


Pulse  77   04/02/22 11:50


 


Resp  17   04/02/22 10:00


 


BP  93/51   04/02/22 10:00


 


Pulse Ox  96   04/02/22 10:00








                                 Intake & Output











 04/01/22 04/02/22 04/02/22





 18:59 06:59 18:59


 


Intake Total 342.660 385.265 197.338


 


Output Total 750 1150 150


 


Balance -407.340 -764.735 47.338


 


Weight  107.7 kg 


 


Intake:   


 


   240 80


 


    Sodium Chloride 0.9% 1, 220 240 80





    000 ml @ 20 mls/hr IV .   





    Q24H [facility] Rx#:[id number]   


 


  Intake, IV Titration 122.660 145.265 117.338





  Amount   


 


    Heparin Sod,Pork in 0.45% 122.660 145.265 117.338





    NaCl 25,000 unit In 0.45   





    % NaCl 1 250ml.bag @ 18   





    UNITS/KG/HR 19.224 mls/hr   





    IV .Q13H1M [facility] Rx#:   





    [id number]   


 


Output:   


 


  Urine 750 1150 150


 


  Stool  0 


 


Other:   


 


  Voiding Method Incontinent Incontinent Incontinent





 External Catheter External Catheter External Catheter


 


  # Voids 0  








                       ABP, PAP, CO, CI - Last Documented











Arterial Blood Pressure        107/66

















- Labs


CBC & Chem 7: 


                                 04/02/22 07:29





                                 04/02/22 07:29


Labs: 


                  Abnormal Lab Results - Last 24 Hours (Table)











  04/02/22 04/02/22 04/02/22 Range/Units





  00:07 07:29 07:29 


 


MCHC   30.6 L   (31.0-37.0)  g/dL


 


Plt Count   125 L   (150-450)  k/uL


 


Lymphocytes #   0.8 L   (1.0-4.8)  k/uL


 


APTT  >200.0 H*    (22.0-30.0)  sec


 


Sodium    135 L  (137-145)  mmol/L


 


Carbon Dioxide    32 H  (22-30)  mmol/L


 


BUN    22 H  (7-17)  mg/dL


 


Glucose    126 H  (74-99)  mg/dL














  04/02/22 Range/Units





  07:29 


 


MCHC   (31.0-37.0)  g/dL


 


Plt Count   (150-450)  k/uL


 


Lymphocytes #   (1.0-4.8)  k/uL


 


APTT  192.0 H*  (22.0-30.0)  sec


 


Sodium   (137-145)  mmol/L


 


Carbon Dioxide   (22-30)  mmol/L


 


BUN   (7-17)  mg/dL


 


Glucose   (74-99)  mg/dL








                      Microbiology - Last 24 Hours (Table)











 03/31/22 22:30 Nasal Screen MRSA/MSSA - Final





 Nasal Swab

## 2022-04-02 NOTE — P.PN
Subjective


Progress Note Date: 04/02/22


Principal diagnosis: 





Coronary artery disease with 99% RCA stenosis, non-STEMI this admission, status 

post unsuccessful PCI to the right coronary artery, severe aortic valve stenosi

s, trace mitral valve regurgitation with mild mitral valve stenosis, dilated 

cardiomyopathy, acute on chronic systolic heart failure.  Past medical history 

significant for hypertension, hyperlipidemia, chronic hypoxic respiratory 

failure on 2 L nasal cannula home oxygen, COVID-19 pneumonia in September 2021, 

remains unvaccinated for COVID-19, morbid obesity, depression, remote history of

nicotine dependence, severe restrictive lung disease. 





The patient was seen in follow-up today 04/02/2022 at her bedside in the 

intensive care unit.  Currently she denies any complaints of pain or shortness 

of breath, although she reports that she has not been very active here in the 

hospital to get short of breath.  She also denies any complaints of nausea or 

hot flashes sensations since being hospitalized here at [facility].  She

was seen by Dr. Munson from dentistry yesterday and was cleared from the dentist 

aspect.  Oxygen saturation are 98% on 2 L nasal cannula.  Heparin drip infusing 

per protocol which is managed by cardiology.  Laboratory results this morning 

show a WBC count of 7.4, hemoglobin 12.1, hematocrit 39.5, platelets 125, sodium

135, potassium 3.7, BUN 61 and creatinine 0.61.  A computed tomography scan of 

her chest without contrast was also completed yesterday and was reviewed by Dr. Mccarty from cardiothoracic surgery.





Objective





- Vital Signs


Vital signs: 


                                   Vital Signs











Temp  97.7 F   04/02/22 04:00


 


Pulse  63   04/02/22 08:12


 


Resp  19   04/02/22 08:00


 


BP  97/57   04/02/22 08:00


 


Pulse Ox  98   04/02/22 08:00








                                 Intake & Output











 04/01/22 04/02/22 04/02/22





 18:59 06:59 18:59


 


Intake Total 342.660 385.265 157.338


 


Output Total 750 1150 150


 


Balance -407.340 -764.735 7.338


 


Weight  107.7 kg 


 


Intake:   


 


   240 40


 


    Sodium Chloride 0.9% 1, 220 240 40





    000 ml @ 20 mls/hr IV .   





    Q24H [facility] Rx#:[id number]   


 


  Intake, IV Titration 122.660 145.265 117.338





  Amount   


 


    Heparin Sod,Pork in 0.45% 122.660 145.265 117.338





    NaCl 25,000 unit In 0.45   





    % NaCl 1 250ml.bag @ 18   





    UNITS/KG/HR 19.224 mls/hr   





    IV .Q13H1M [facility] Rx#:   





    [id number]   


 


Output:   


 


  Urine 750 1150 150


 


  Stool  0 


 


Other:   


 


  Voiding Method Incontinent Incontinent Incontinent





 External Catheter External Catheter External Catheter


 


  # Voids 0  








                       ABP, PAP, CO, CI - Last Documented











Arterial Blood Pressure        107/66

















- Exam





CONSTITUTIONAL: Appears cooperative, comfortable, and in no acute distress.


RESPIRATORY:  Lungs sounds essentially clear throughout, diminished bilateral 

bases, right greater than left.  Respirations are symmetrical and nonlabored.  

Currently on 2 L nasal cannula with oxygen saturation 98%.  Able to achieve 1000

mL on incentive spirometry.  Strong cough.  


CARDIOVASCULAR:  S1, S2 present, pansystolic murmur present 3/6.  Regular rate 

and rhythm, sinus rhythm on telemetry.  Doppler lower extremity pulses 

bilaterally.  Trace bilateral lower extremity edema present.  No calf pain or 

tenderness noted.  H


GASTROINTESTINAL:  Abdomen soft, nontender, nondistended.  Active bowel sounds 

present 4 quadrants.  Tolerating diet.


INTEGUMENTARY:  Skin is warm and dry with evidence of good perfusion.  


NEUROLOGIC:  Cranial nerves II through XII intact


MUSKULOSKELETAL:  Able to move all extremities, strength equal bilaterally


PSYCHIATRIC:  Alert and oriented to person place and time, flat affect








- Allied health notes


Allied health notes reviewed: nursing





- Labs


CBC & Chem 7: 


                                 04/02/22 07:29





                                 04/02/22 07:29


Labs: 


                  Abnormal Lab Results - Last 24 Hours (Table)











  04/01/22 04/01/22 04/02/22 Range/Units





  09:25 10:30 00:07 


 


MCHC  30.2 L    (31.0-37.0)  g/dL


 


Plt Count     (150-450)  k/uL


 


Lymphocytes #  0.6 L    (1.0-4.8)  k/uL


 


APTT   108.0 H*  >200.0 H*  (22.0-30.0)  sec


 


Sodium     (137-145)  mmol/L


 


Carbon Dioxide     (22-30)  mmol/L


 


BUN     (7-17)  mg/dL


 


Glucose     (74-99)  mg/dL














  04/02/22 04/02/22 04/02/22 Range/Units





  07:29 07:29 07:29 


 


MCHC  30.6 L    (31.0-37.0)  g/dL


 


Plt Count  125 L    (150-450)  k/uL


 


Lymphocytes #  0.8 L    (1.0-4.8)  k/uL


 


APTT    192.0 H*  (22.0-30.0)  sec


 


Sodium   135 L   (137-145)  mmol/L


 


Carbon Dioxide   32 H   (22-30)  mmol/L


 


BUN   22 H   (7-17)  mg/dL


 


Glucose   126 H   (74-99)  mg/dL














Assessment and Plan


Assessment: 





1.  Coronary artery disease with 99% RCA stenosis, non-STEMI this admission, 

status post unsuccessful PCI to the right coronary artery


2.  Severe aortic valve stenosis, peak/mean gradient 78.27/47.94 mmHg


3.  Trace mitral regurgitation with mild mitral stenosis on transthoracic 

echocardiogram, peak/mean gradients of 14.48/6.4 mmHg, 


4.  Dilated cardiomyopathy


5.  Acute on chronic systolic heart failure, EF 25-30% on transthoracic 

echocardiogram


6.  History of hypertension


7.  Hyperlipidemia, cholesterol 333,  


8.  Chronic hypoxic respiratory failure on 2 L nasal cannula home oxygen


9.  COVID-19 pneumonia in September 2021


10.  Remains unvaccinated for COVID-19


11.  Morbid obesity


12.  Depression, currently on Zoloft


13.  Remote history of nicotine dependence


14.  Severe restrictive lung disease, FEV1 45% of predicted 


15.  Generalized debility, patient uses walker for ambulation


Plan: 





1.  Continue aspirin, statin and beta blocker. Heparin drip managed by 

cardiology.


2.  Continue diuresis, heart failure management per cardiology.


3.  STS risk calculated and was discussed with the patient by Dr Mccarty.  Patient

is very high risk for surgery due to multiple comorbidities, final decision 

regarding surgery still pending versus high risk PCI still an option.


4.  Medical management of other comorbidities per primary care service.


5.  More recommendations to follow based on patient's clinical course.





Time with Patient: Greater than 30

## 2022-04-02 NOTE — CT
EXAMINATION TYPE: CT angio chest

 

DATE OF EXAM: 4/2/2022

 

COMPARISON: Chest CT from yesterday.

 

HISTORY: Evaluate aortic root for dissection

 

CT DLP: 1715 mGycm. Automated Exposure Control for Dose Reduction was Utilized.

 

CONTRAST: 

CTA scan of the thorax is performed without and with IV Contrast, patient injected with 100ml mL of I
sovue 370, aneurysm protocol. . 3D reconstructed images are created on an independent workstation and
 reviewed.

 

FINDINGS: Exam is suboptimal as patient unable to hold breath

 

LUNGS: Small to moderate size right pleural effusion is redemonstrated. Additional scattered areas of
 groundglass opacity throughout the bilateral lungs with slightly more prominent versus prior study. 
No pneumothorax seen bilaterally. No significant left-sided pleural effusion.

 

MEDIASTINUM: No suspicious hyperdense material to suggest intramural hematoma. Surgical change at lev
el of the aortic valve is present. Postcontrast images show no linear hypodensity to suggest aortic d
issection. No thoracic aortic aneurysm is seen. Normal three-vessel origin from the aortic arch. Pers
istent prominent prevascular along with AP window and right pericarinal lymph nodes axial image 52 ar
e redemonstrated. Prominent right and left pulmonary arteries consistent with underlying pulmonary ar
wendy hypertension are redemonstrated. Persistent cardiomegaly. No pericardial effusion.

 

OTHER: There is persistent 1.7 cm dermal posterior right upper thoracic rounded low dense lesion axia
l image 9 favoring sebaceous cyst or other benign dermatologic etiology.

 

 IMPRESSION: 

1. No CTA evidence for thoracic aortic aneurysm or dissection.

2. Cardiomegaly with small to moderate-sized right pleural effusion redemonstrated. There is bilatera
l alveolar and interstitial edema redemonstrated. Cannot exclude bilateral multifocal areas of worsen
ing acute infiltrates. Correlate to exclude COVID-19 infection. Evidence of underlying pulmonary cedrick
ry hypertension. Reactive thoracic adenopathy redemonstrated.

## 2022-04-03 LAB
ANION GAP SERPL CALC-SCNC: 5 MMOL/L
BASOPHILS # BLD AUTO: 0.1 K/UL (ref 0–0.2)
BASOPHILS NFR BLD AUTO: 0 %
BUN SERPL-SCNC: 21 MG/DL (ref 7–17)
CALCIUM SPEC-MCNC: 8.6 MG/DL (ref 8.4–10.2)
CHLORIDE SERPL-SCNC: 98 MMOL/L (ref 98–107)
CO2 SERPL-SCNC: 30 MMOL/L (ref 22–30)
EOSINOPHIL # BLD AUTO: 0.1 K/UL (ref 0–0.7)
EOSINOPHIL NFR BLD AUTO: 0 %
ERYTHROCYTE [DISTWIDTH] IN BLOOD BY AUTOMATED COUNT: 3.46 M/UL (ref 3.8–5.4)
ERYTHROCYTE [DISTWIDTH] IN BLOOD BY AUTOMATED COUNT: 3.72 M/UL (ref 3.8–5.4)
ERYTHROCYTE [DISTWIDTH] IN BLOOD: 14.3 % (ref 11.5–15.5)
ERYTHROCYTE [DISTWIDTH] IN BLOOD: 14.5 % (ref 11.5–15.5)
GLUCOSE SERPL-MCNC: 151 MG/DL (ref 74–99)
HCT VFR BLD AUTO: 31.4 % (ref 34–46)
HCT VFR BLD AUTO: 34.1 % (ref 34–46)
HGB BLD-MCNC: 10 GM/DL (ref 11.4–16)
HGB BLD-MCNC: 10.8 GM/DL (ref 11.4–16)
LYMPHOCYTES # SPEC AUTO: 1.1 K/UL (ref 1–4.8)
LYMPHOCYTES NFR SPEC AUTO: 7 %
MCH RBC QN AUTO: 28.8 PG (ref 25–35)
MCH RBC QN AUTO: 29.1 PG (ref 25–35)
MCHC RBC AUTO-ENTMCNC: 31.6 G/DL (ref 31–37)
MCHC RBC AUTO-ENTMCNC: 31.7 G/DL (ref 31–37)
MCV RBC AUTO: 90.6 FL (ref 80–100)
MCV RBC AUTO: 91.9 FL (ref 80–100)
MONOCYTES # BLD AUTO: 0.4 K/UL (ref 0–1)
MONOCYTES NFR BLD AUTO: 3 %
NEUTROPHILS # BLD AUTO: 14.5 K/UL (ref 1.3–7.7)
NEUTROPHILS NFR BLD AUTO: 89 %
PLATELET # BLD AUTO: 180 K/UL (ref 150–450)
PLATELET # BLD AUTO: 186 K/UL (ref 150–450)
POTASSIUM SERPL-SCNC: 4.1 MMOL/L (ref 3.5–5.1)
SODIUM SERPL-SCNC: 133 MMOL/L (ref 137–145)
WBC # BLD AUTO: 10.6 K/UL (ref 3.8–10.6)
WBC # BLD AUTO: 16.3 K/UL (ref 3.8–10.6)

## 2022-04-03 RX ADMIN — FUROSEMIDE SCH MG: 10 INJECTION, SOLUTION INTRAMUSCULAR; INTRAVENOUS at 10:29

## 2022-04-03 RX ADMIN — BUDESONIDE AND FORMOTEROL FUMARATE DIHYDRATE SCH: 160; 4.5 AEROSOL RESPIRATORY (INHALATION) at 19:47

## 2022-04-03 RX ADMIN — DIGOXIN SCH MCG: 125 TABLET ORAL at 10:29

## 2022-04-03 RX ADMIN — ATORVASTATIN CALCIUM SCH MG: 40 TABLET, FILM COATED ORAL at 20:09

## 2022-04-03 RX ADMIN — SPIRONOLACTONE SCH MG: 25 TABLET, FILM COATED ORAL at 10:29

## 2022-04-03 RX ADMIN — BUDESONIDE AND FORMOTEROL FUMARATE DIHYDRATE SCH: 160; 4.5 AEROSOL RESPIRATORY (INHALATION) at 07:33

## 2022-04-03 RX ADMIN — Medication SCH: at 00:40

## 2022-04-03 RX ADMIN — ASPIRIN 81 MG CHEWABLE TABLET SCH MG: 81 TABLET CHEWABLE at 10:30

## 2022-04-03 RX ADMIN — FUROSEMIDE SCH MG: 10 INJECTION, SOLUTION INTRAMUSCULAR; INTRAVENOUS at 20:09

## 2022-04-03 RX ADMIN — PANTOPRAZOLE SODIUM SCH MG: 40 INJECTION, POWDER, FOR SOLUTION INTRAVENOUS at 10:31

## 2022-04-03 RX ADMIN — IPRATROPIUM BROMIDE AND ALBUTEROL SULFATE SCH ML: .5; 3 SOLUTION RESPIRATORY (INHALATION) at 19:44

## 2022-04-03 RX ADMIN — IPRATROPIUM BROMIDE AND ALBUTEROL SULFATE SCH: .5; 3 SOLUTION RESPIRATORY (INHALATION) at 12:02

## 2022-04-03 RX ADMIN — CEFAZOLIN SCH MLS/HR: 330 INJECTION, POWDER, FOR SOLUTION INTRAMUSCULAR; INTRAVENOUS at 10:30

## 2022-04-03 RX ADMIN — IPRATROPIUM BROMIDE AND ALBUTEROL SULFATE SCH ML: .5; 3 SOLUTION RESPIRATORY (INHALATION) at 15:22

## 2022-04-03 RX ADMIN — ACETAMINOPHEN PRN MG: 325 TABLET, FILM COATED ORAL at 17:22

## 2022-04-03 RX ADMIN — IPRATROPIUM BROMIDE AND ALBUTEROL SULFATE SCH ML: .5; 3 SOLUTION RESPIRATORY (INHALATION) at 07:33

## 2022-04-03 RX ADMIN — BUDESONIDE AND FORMOTEROL FUMARATE DIHYDRATE SCH PUFF: 160; 4.5 AEROSOL RESPIRATORY (INHALATION) at 19:44

## 2022-04-03 RX ADMIN — IPRATROPIUM BROMIDE AND ALBUTEROL SULFATE SCH: .5; 3 SOLUTION RESPIRATORY (INHALATION) at 19:47

## 2022-04-03 RX ADMIN — HEPARIN SODIUM SCH: 10000 INJECTION, SOLUTION INTRAVENOUS at 17:47

## 2022-04-03 RX ADMIN — Medication SCH MG: at 20:09

## 2022-04-03 NOTE — P.PN
Subjective


Progress Note Date: 04/03/22


Principal diagnosis: 


 Non-STEMI





This is a pleasant 71-year-old female patient who has a history of hypertension,

hyperlipidemia, anxiety/depression, obesity, previous chronic tobacco 

dependence, chronic hypoxic respiratory failure from a COVID-19 pneumonia in 

2021.  Remains unvaccinated.  She also has a previous non-ST segment elevation 

myocardial infarction, cardiomyopathy with ejection fraction 25-30%, severe 

aortic stenosis.  She was recently admitted to [facility] for 

dizziness and nausea and had undergone cardiac catheterization which revealed a 

99% stenosis of the ostial RCA and transferred here for intervention.  

Unfortunately the lesion was too tight to cross and the procedure was aborted.  

She is being evaluated by surgical services for possible single-vessel bypass 

and aortic valve replacement.  She is seen today in consultation in the intensi

ve care unit and his pacing possible surgery.  She is currently sitting up in 

bed.  Awake and alert.  Anxious, teary-eyed.  She was found to have an FEV1 

value of 45% of predicted.  Currently, she denies any worsening shortness of 

breath, cough or congestion.  She is maintaining O2 saturations in the mid 90s 

on 2 L/m per nasal cannula.  She's been afebrile.  She has normal saline running

at 20 ML's per hour.  She remains on a heparin drip per weight base protocol.  

Computed tomography scan of the chest reveals moderate right-sided pleural 

effusion with some fluid in the fissure.  White count 7.1.  Hemoglobin 12.8.  

Sodium 135.  Potassium 4.0.  BUN 22.  Creatinine 0.65.  We'll switch 28.  AST 

21.  ALT 16.  Urinalysis negative.  Hepatitis screen negative.





The patient is seen today 04/02/2022 in follow-up in the ICU.  She is currently 

sitting up in bed.  Awake and alert in no acute distress.  Maintaining O2 

saturations in the 90s on 2 L/m per nasal cannula.  She has normal saline 

running 20 miles per hour.  Heparin per weight-based protocol.  She currently 

denies any worsening shortness of breath, cough or congestion.  No chest pain or

palpitations.  White count 7.4.  Hemoglobin 12.1.  Platelets 125.  Sodium 135.  

Potassium 3.7.  Bicarb 32.  BUN 22.  Creatinine 0.61.  She is continued on 

DuoNeb inhalations, Symbicort.  She remains on IV diuretics.  Currently in a -

1.1 L balance.





On 04/03/2022 patient seen in follow-up in intensive care unit, she is resting 

in bed, denies any chest pain, she is awake and alert, she is slightly tearful 

at the possibility of having to be transferred to a tertiary care facility 

because of being high risk for surgical intervention for coronary artery disease

with non-ST elevated myocardial infarct in this admission and status post 

unsuccessful PCI to the RCA, and severe aortic valve stenosis.  She is currently

on 2 L of oxygen breathing currently, pulse ox is 95-97%, hemodynamically she is

stable, not requiring any vasoactive drips, she is on heparin at weight-based 

protocol, point tenderness and a to 20 ML per hour.  CTA chest was completed 

showing no evidence for thoracic aortic aneurysm or dissection, cardiomegaly 

with small to moderate-sized right pleural effusion, and bilateral alveolar and 

interstitial edema, possibility of bilateral multifocal areas of acute 

infiltrate could not be completely excluded.  Patient continues on IV diuretics 

Lasix 40 mg every 8 hours, the patient is an +98 mL net fluid balance over the 

last 24 hours, trace s lower extremity edema although her weight is up by 2.7 kg

according to the recorded weights.  Patient currently continues on inhaled and 

nebulized bronchodilators, she is on home dose Coreg 3.125 mg twice daily, 

digoxin 125 mics daily, she is on Lipitor 40 mg, aspirin 81 mg, and Aldactone 25

mg.  Cardiology and CT surgery are closely following.  Today's labs have been 

reviewed, sodium is 133, potassium is 4.1, B1 is 21 creatinine 0.69.











Objective





- Vital Signs


Vital signs: 


                                   Vital Signs











Temp  98.1 F   04/03/22 08:00


 


Pulse  79   04/03/22 09:00


 


Resp  18   04/03/22 09:00


 


BP  92/66   04/03/22 09:00


 


Pulse Ox  97   04/03/22 09:00








                                 Intake & Output











 04/02/22 04/03/22 04/03/22





 18:59 06:59 18:59


 


Intake Total 959.154 489.598 60


 


Output Total 550 800 0


 


Balance 409.154 -310.402 60


 


Weight  110.4 kg 


 


Intake:   


 


   240 60


 


    0.9 NACL bolus 500  


 


    Sodium Chloride 0.9% 1, 240 240 60





    000 ml @ 20 mls/hr IV .   





    Q24H [facility] Rx#:[id number]   


 


  Intake, IV Titration 219.154 69.598 





  Amount   


 


    Heparin Sod,Pork in 0.45% 219.154 69.598 





    NaCl 25,000 unit In 0.45   





    % NaCl 1 250ml.bag @ 18   





    UNITS/KG/HR 19.224 mls/hr   





    IV .Q13H1M [facility] Rx#:   





    [id number]   


 


  Oral  180 


 


Output:   


 


  Urine 550 800 0


 


  Stool  0 


 


Other:   


 


  Voiding Method Incontinent Incontinent 





 External Catheter External Catheter 








                       ABP, PAP, CO, CI - Last Documented











Arterial Blood Pressure        107/66

















- Exam


 GENERAL EXAM: Alert, very pleasant, 71-year-old white female, resting in bed, 

on 2 L of oxygen pulse ox is 95-97% comfortable in no apparent distress.


HEAD: Normocephalic/atraumatic.


EYES: Normal reaction of pupils, equal size.  Conjunctiva pink, sclera white.


NOSE: Clear with pink turbinates.


THROAT: No erythema or exudates.


NECK: No masses, no JVD, no thyroid enlargement, no adenopathy.


CHEST: No chest wall deformity.  Symmetrical expansion. 


LUNGS: Equal air entry with diminished breath sounds at the bases


CVS: Regular rate and rhythm, normal S1 and S2, no gallops, no murmurs, no rubs


ABDOMEN: Soft, nontender.  No hepatosplenomegaly, normal bowel sounds, no 

guarding or rigidity.


EXTREMITIES: No clubbing, trace pretibial edema, chronic venous stasis changes 

in bilateral lower extremities no cyanosis, 2+ pulses and upper and lower 

extremities.


MUSCULOSKELETAL: Muscle strength and tone normal.


SPINE: No scoliosis or deformity


SKIN: No rashes


CENTRAL NERVOUS SYSTEM: Alert and oriented -3.  No focal deficits, tone is 

normal in all 4 extremities.


PSYCHIATRIC: Alert and oriented -3.  Appropriate affect.  Intact judgment and 

insight.











- Labs


CBC & Chem 7: 


                                 04/02/22 07:29





                                 04/03/22 08:29


Labs: 


                  Abnormal Lab Results - Last 24 Hours (Table)











  04/02/22 04/03/22 04/03/22 Range/Units





  16:21 00:29 08:29 


 


APTT  93.4 H  51.0 H  52.7 H  (22.0-30.0)  sec


 


Sodium     (137-145)  mmol/L


 


BUN     (7-17)  mg/dL


 


Glucose     (74-99)  mg/dL














  04/03/22 Range/Units





  08:29 


 


APTT   (22.0-30.0)  sec


 


Sodium  133 L  (137-145)  mmol/L


 


BUN  21 H  (7-17)  mg/dL


 


Glucose  151 H  (74-99)  mg/dL








                      Microbiology - Last 24 Hours (Table)











 03/31/22 22:30 Nasal Screen MRSA/MSSA - Final





 Nasal Swab 














Assessment and Plan


Plan: 


 Assessment:





#1.  Acute non-ST segment elevated myocardial infarction with 99% stenosis of 

the ostial RCA with unsuccessful PCI





#2.  Severe aortic stenosis





#3.  Severe pulmonary hypertension





#4.  Ischemic cardiomyopathy with an ejection fraction of 25-30%





#5.  Acute on chronic systolic CHF with a right-sided pleural effusion





#6.  History of COVID-19 pneumonia in September 2021 requiring subsequent home 

oxygen at 2 L/m





#7.  COPD with an FEV1 value of 45% of predicted and an abnormal MVV





#8.  Morbid obesity





#9.  Hypertension





#10.  Hyperlipidemia





#11.  Anxiety and depression





#12.  Poor overall functional performance based on the above-mentioned multiple 

comorbidities





Plan:





Continue current medical management


Continue monitoring in the intensive care unit


Patient continues on diuretics


CTA chest has been reviewed


Continue beta blockers, Lipitor, aspirin, digoxin per cardiology


Patient is undergoing preop evaluation and thus far has been found to be 

moderate to severe surgical risk


There is a possibility of transfer to a tertiary care facility per attending 

physician


Continue to monitor her clinical course in the ICU





I have personally seen and examined the patient, performed the documentation and

the assessment and  plan as written.  Number of minutes spent on the visit: [15]

 














Time with Patient: Less than 30

## 2022-04-03 NOTE — P.PN
Subjective


Progress Note Date: 04/03/22


Principal diagnosis: 





Coronary artery disease with 99% RCA stenosis, non-STEMI this admission, status 

post unsuccessful PCI to the right coronary artery, severe aortic valve stenosi

s, trace mitral valve regurgitation with mild mitral valve stenosis, dilated 

cardiomyopathy, acute on chronic systolic heart failure.  Past medical history 

significant for hypertension, hyperlipidemia, chronic hypoxic respiratory 

failure on 2 L nasal cannula home oxygen, COVID-19 pneumonia in September 2021, 

remains unvaccinated for COVID-19, morbid obesity, depression, remote history of

nicotine dependence, severe restrictive lung disease. 





The patient was seen in follow-up today 04/03/2022 at her bedside in the 

intensive care unit.  The patient is laying in bed, is very teary eyed and repo

rts she feels quite depressed again this morning.  Denies any complaints of pain

or shortness of breath, although is complaining of a cough with pain to her 

abdomen with coughing.  Denies any nausea or vomiting.  Oxygen saturations are 

96% on 2 L nasal cannula.  She is achieving 1500 mL on her incentive spirometry 

with encouragement.  Bedside telemetry showing normal sinus rhythm heart rate 87

BPM.  A chest CTA was completed just today with report showing no evidence for 

thoracic aortic aneurysm or dissection, cardiomegaly with small to moderate size

right pleural effusion was redemonstrated, bilateral alveolar and interstitial 

edema redemonstrated, and evidence of underlying pulmonary artery hypertension 

and reactive thoracic adenopathy.  She has been afebrile in the last 24 hours.  

Preoperative nasal swab screening was negative for MRSA/MSSA.  A 5 m walk test 

was attempted this morning although on attempting to get the patient out of bed 

the patient became very teary-eyed, and agitated and urinated on the floor.  The

5 mm walk test was unable to be completed as the patient said she could not do 

it.





Objective





- Vital Signs


Vital signs: 


                                   Vital Signs











Temp  98.1 F   04/03/22 08:00


 


Pulse  90   04/03/22 08:00


 


Resp  20   04/03/22 08:00


 


BP  92/55   04/03/22 08:00


 


Pulse Ox  95   04/03/22 08:00








                                 Intake & Output











 04/02/22 04/03/22 04/03/22





 18:59 06:59 18:59


 


Intake Total 959.154 489.598 40


 


Output Total 550 800 0


 


Balance 409.154 -310.402 40


 


Weight  110.4 kg 


 


Intake:   


 


   240 40


 


    0.9 NACL bolus 500  


 


    Sodium Chloride 0.9% 1, 240 240 40





    000 ml @ 20 mls/hr IV .   





    Q24H [facility] Rx#:[id number]   


 


  Intake, IV Titration 219.154 69.598 





  Amount   


 


    Heparin Sod,Pork in 0.45% 219.154 69.598 





    NaCl 25,000 unit In 0.45   





    % NaCl 1 250ml.bag @ 18   





    UNITS/KG/HR 19.224 mls/hr   





    IV .Q13H1M [facility] Rx#:   





    [id number]   


 


  Oral  180 


 


Output:   


 


  Urine 550 800 0


 


  Stool  0 


 


Other:   


 


  Voiding Method Incontinent Incontinent 





 External Catheter External Catheter 








                       ABP, PAP, CO, CI - Last Documented











Arterial Blood Pressure        107/66

















- Exam





CONSTITUTIONAL: Very tearful, episodes of agitation and is in no acute distress.


RESPIRATORY:  Lungs sounds essentially clear throughout, diminished bilateral 

bases, right greater than left.  Respirations are symmetrical and nonlabored.  

Currently on 2 L nasal cannula with oxygen saturation 9%.  Able to achieve 1500 

mL on incentive spirometry.  Strong cough.  


CARDIOVASCULAR:  S1, S2 present, pansystolic murmur present 3/6.  Regular rate 

and rhythm, sinus rhythm on telemetry.  Doppler lower extremity pulses 

bilaterally.  No calf pain or tenderness noted. 


GASTROINTESTINAL:  Abdomen soft, nontender, nondistended.  Active bowel sounds 

present 4 quadrants.  Tolerating diet.


INTEGUMENTARY:  Skin is warm and dry with evidence of good perfusion.  


NEUROLOGIC:  Cranial nerves II through XII intact


MUSKULOSKELETAL:  Able to move all extremities, strength equal bilaterally.


PSYCHIATRIC:  Alert, oriented to person place and time, flat affect.








- Allied health notes


Allied health notes reviewed: nursing





- Labs


CBC & Chem 7: 


                                 04/02/22 07:29





                                 04/03/22 08:29


Labs: 


                  Abnormal Lab Results - Last 24 Hours (Table)











  04/02/22 04/02/22 04/03/22 Range/Units





  07:29 16:21 00:29 


 


APTT  192.0 H*  93.4 H  51.0 H  (22.0-30.0)  sec








                      Microbiology - Last 24 Hours (Table)











 03/31/22 22:30 Nasal Screen MRSA/MSSA - Final





 Nasal Swab 














- Imaging and Cardiology


CT scan - chest: report reviewed, image reviewed





Assessment and Plan


Assessment: 





1.  Coronary artery disease with 99% RCA stenosis, non-STEMI this admission, 

status post unsuccessful PCI to the right coronary artery


2.  Severe aortic valve stenosis, peak/mean gradient 78.27/47.94 mmHg


3.  Trace mitral regurgitation with mild mitral stenosis on transthoracic 

echocardiogram, peak/mean gradients of 14.48/6.4 mmHg, 


4.  Dilated cardiomyopathy


5.  Acute on chronic systolic heart failure, EF 25-30% on transthoracic 

echocardiogram


6.  History of hypertension


7.  Hyperlipidemia, cholesterol 333,  


8.  Chronic hypoxic respiratory failure on 2 L nasal cannula home oxygen


9.  COVID-19 pneumonia in September 2021


10.  Remains unvaccinated for COVID-19


11.  Morbid obesity


12.  Depression, currently on Zoloft


13.  Remote history of nicotine dependence


14.  Severe restrictive lung disease, FEV1 45% of predicted 


15.  Generalized debility, patient uses walker for ambulation


Plan: 





1.  Continue aspirin, statin and beta blocker. Heparin drip managed by 

cardiology.


2.  Continue diuresis, heart failure management per cardiology.


3.  STS risk calculated and was discussed with the patient by Dr Mccarty.  Patient

is very high risk for surgery due to multiple comorbidities, final decision 

regarding surgery still pending versus high risk PCI option and future TAVR.


4.  Medical management of other comorbidities per primary care service.


5.  More recommendations to follow based on patient's clinical course.








Addendum: Agree with above. CTA Chest does not show aortic dissection. However, 

the patient is a very poor surgical candidate for CABG/AVR at this time. She is 

not motivated with multiple psych issues. In addition she is high risk from the 

pulmonary standpoint s/p COVID 19 infection last sept. Of note, she is also not 

vaccinated. She will do poorly with surgery. Discussed with Dr. Jacobs. Plan 

will be PCI/TAVR. 





Time with Patient: Greater than 30

## 2022-04-03 NOTE — P.PN
Subjective


Progress Note Date: 04/03/22





HISTORY OF PRESENT ILLNESS


This is a 71-year-old female with past medical history of dilated cardiomyopathy

with ejection fraction of 15-20%, severe aortic valve stenosis, mild aortic 

regurgitation with moderate to severe mitral regurgitation, chronic systolic 

heart failure, hyperlipidemia, obesity with possible obstructive sleep apnea and

obesity hypoventilation syndrome, chronic hypoxic respiratory failure on home O2

at 2 L nasal cannula, hypertension hypertensive cardio vascular disease, 

previous Covid 19 infection.  Patient presented to [facility] 

slight elevation of troponin, EKG showed changes suggestive ischemic changes and

was started on aspirin, heparin drip and admitted to the hospital.  Patient was 

seen by cardiology. She was supposed to have a heart catheterization done as an 

outpatient along with [facility] for possible aortic valve replacement and mitral valve

and possible CABG.  Patient underwent coronary angiography finding right 

dominant vessel, 99% ostial stenosis, aortic pressure is 100/70, 40 mm gradient 

across the aortic valve.  Left ventricular end diastolic pressure is 2530.  

Patient was transferred to [facility] for PTCA and stent 

placement of the RCA which was unsuccessful.  Consult with cardiothoracic 

surgery for CABG and valve repair/replacement.





4/1: Patient is seen today in the intensive care unit.  She denies chest pain or

shortness of breath. She complains of anxiety and decreased appetite. Less cough

today.She has been seen by intensivist and cardiothoracic surgery, currently on 

heparin drip, consult was added for dental evaluation and clearance and 

panoramic CT.  Patient has been afebrile, heart rate in the 80s, blood pressure 

101/73, pulse ox 96%.  Repeat blood work reveals CBC is unremarkable.  Potassium

4.0.  Urinalysis negative for infection.  Hepatitis panel negative.  MRSA nasal 

screen is in process.  CT of the chest revealed cardiomegaly with suspected 

pulmonary edema and moderate to marked right sided pleural effusion.  Associated

infection can't be excluded.


Echocardiogram reveals EF of 25-30% with mild concentric left ventricular 

hypertrophy, severe global hypokinesia of the LV, severe aortic stenosis with 

gradient 78.27 mm a new 3/47.94 mmHg, trace mitral regurgitation, mild mitral 

stenosis, mild tricuspid regurgitation, mild pulmonary hypertension, RVSP 43.33 

mmHg.


Carotid ultrasound revealed less than 50% stenosis bilateral carotid systems.





4/2: Patient is sitting up in bed she underwent CTA of the chest for evaluation 

of dissection of the right coronary artery, she denies any chest pain at this 

time, she has no shortness breath, she has no abdominal pain, nausea or 

vomiting, she continues to have some coughing noted from production, she 

continues to be somewhat depressed and anxious and she is not sure what to do, 

she is missing her daughter she stated and she doesn't think her daughter is 

able to come and see her.





4/3: Patient is sitting on the bed in no apparent distress, she continues to be 

quite depressed and anxious about her situation, she has not made up her mind, I

had a long conversation with Dr. Jacobs regarding the plan to pursue the [facility] care at this time, she was deemed high surgical risk per cardiovascular 

surgery service at the current [facility], and she would benefit from a 

transcatheter aortic valve replacement plus reattempted PCI of the RCA, her CT 

angiography of the chest did not show evidence of any dissection of the thoracic

aorta , patient is maintained on Zoloft 100 mg every day, she is mentioned in 

ICU, and the plan to the PCI hopefully in the next 24 hours patient and her 

daughter Janneth are in agreement for conservative approach and they understand the

risks of the procedure and that she will have the TAVR at a later date or during

this hospital admission depends on her symptoms, we will consult Psychiatry for 

depression and possible bipolar disorder.








REVIEW OF SYSTEMS


Constitutional: No fever, no chills, no night sweats.  No weight change.  

Positive for generalized weakness, positive for fatigue or lethargy.  No daytime

sleepiness.


HEENT: No headache.  No blurred vision or double vision, no loss of vision.  No 

loss of Hearing, no ringing in the ears, no dizziness.  No nasal drainage or 

congestion.  No epistaxis.  No sore throat.


Lungs: Positive for shortness of breath, positive for cough, no sputum 

production.  No wheezing.  Reports dyspnea on exertion.


Cardiovascular: No chest pain, no lower extremity edema.  No palpitations.  No 

paroxysmal nocturnal dyspnea.  No orthopnea.  No lightheadedness or dizziness.  

No syncopal episodes.


Abdominal: No abdominal pain.  No nausea, vomiting.  No diarrhea.  No 

constipation.  No bloody or tarry stools.  Reports loss of appetite.


Genitourinary: No dysuria, increased frequency, urgency.  No urinary retention.


Musculoskeletal: No myalgias.  Generalized muscle weakness, no gait dysfunction,

no frequent falls.  Positive for back pain.  No neck pain.


Integumentary: No wounds, no lesions.  No rash or pruritus.  Positive for 

bruising.  No change in hair or nails.


Neurologic: No aphasia. No facial droop. No change in mentation. No head injury.

No headache. No paralysis. No paresthesia.


Psychiatric: Positive for depression.  Reports anxiety.  Positive for mood 

swings


Endocrine: No abnormal blood sugars.  Positive for weight change.  No excessive 

sweating or thirst.  No cold intolerance.  





PHYSICAL EXAMINATION


Gen: This is a 71-year-old  female.


HEENT: Head is atraumatic, normocephalic. Pupils equal, round. Sclerae is 

anicteric. 


NECK: Supple. No JVD. No lymphadenopathy. No thyromegaly. 


LUNGS: Decreased breath sounds at the bases, decreased tactile fremitus at the 

right lower third, few rhonchi, no expiratory wheeze, no chest wall tenderness. 

No intercostal retractions.


HEART: First heart sound is depressed, second heart sound is normal, systolic 

ejection murmur 3/6 at the right upper sternal border radiating to the base of 

the neck, systolic ejection murmur 2/6 at the apex rating to the axilla.


ABDOMEN: Soft,, nontender, nondistended, positive bowel sounds, multiple e

cchymosis.


EXTREMITIES: 1+ pedal edema.  No calf tenderness.  Her cells pedis +1 

bilaterally.  Bilateral hammertoes.


NEUROLOGICAL: Patient is awake, alert and oriented x3. Cranial nerves 2 through 

12 are grossly intact.,  Cranial nerves II-12 appear grossly intact, muscle 

power 4 out of 5 in upper and lower extremities bilaterally.





ASSESSMENT AND PLAN





1.   Non ST elevation  myocardial infarction with history of prior dilated 

cardiomyopathy status post coronary angiography finding right dominant vessel, 

99% ostial stenosis, aortic pressure is 100/70, 40 mm gradient across the aortic

valve.  Left ventricular end diastolic pressure is 2530.  Patient was 

transferred to [facility] for PTCA and stent placement of the 

RCA which was unsuccessful.  Consult with cardiothoracic surgery appreciated.  

Intensivist consultation appreciated..  Continue aspirin 81 mg daily, on Coreg 

6.25 mg twice daily.  Patient is unable to tolerate statins because of signi

ficant myopathy and plan is to start her on Repatha under 40 mg subcu every 2 

weeks.  Dental evaluation with panoramic CT.





2.  Dilated cardiomyopathy.  continue patient on carvedilol 3.125 mg orally 

twice every day, continue Lasix 40 mg IV push every 12 hours, continue with 

monitoring input and output and daily weight.  Continue heparin drip.





3.  Severe aortic valve stenosis with mild aortic regurgitation.  Discussed with

the patient the pros and cons on transcatheter aortic valve replacement versus 

CABG to the RCA with surgical aortic valve replacement





4.    Moderate to Severe mitral regurgitation. likely will continue with 

conservative management no plan for mitral valve repair. 





5.  Hyperlipidemia.  Patient unable to tolerate statins.  She was started on 

atorvastatin 40 mg once every day.





6.  Obesity with obstructive sleep apnea and obesity hypoventilation syndrome.  

Patient has not had sleep study done yet. 





7.  Chronic hypoxic respiratory failure secondary to his Covid 19 and underlying

COPD and possible obstructive sleep apnea and hypoventilation syndrome.  Patient

is normally on 2 L nasal cannula.





8.  Acute on chronic systolic heart failure.  Continue Lasix 40 mg IV every 12 

hours, Coreg, Aldactone 25 mg daily.  





9.  Hypertension, hypertensive cardiovascular disease. continue carvedilol 3.125

mg orally twice every day patient is not on ACE-I or ARB due to severe 

hypertension,  





10.  Recurrent depression and generalized anxiety disorder.  Continue Zoloft 50 

mg orally once every day, consult Psychiatry for further evaluation


 


11.  DVT prophylaxis.  continue patient on heparin drip.   





12.  GI  prophylaxis.  Protonix 40 mg IV push daily.





13.  Overall prognosis is very guarded.  





14. Discussed with CTS and Cardiology, her daughter and her niece.








Objective





- Vital Signs


Vital signs: 


                                   Vital Signs











Temp  98.1 F   04/03/22 08:00


 


Pulse  88   04/03/22 10:00


 


Resp  30 H  04/03/22 10:00


 


BP  90/71   04/03/22 10:00


 


Pulse Ox  97   04/03/22 10:00








                                 Intake & Output











 04/02/22 04/03/22 04/03/22





 18:59 06:59 18:59


 


Intake Total 959.154 489.598 80


 


Output Total 550 800 0


 


Balance 409.154 -310.402 80


 


Weight  110.4 kg 


 


Intake:   


 


   240 80


 


    0.9 NACL bolus 500  


 


    Sodium Chloride 0.9% 1, 240 240 80





    000 ml @ 20 mls/hr IV .   





    Q24H [facility] Rx#:[id number]   


 


  Intake, IV Titration 219.154 69.598 





  Amount   


 


    Heparin Sod,Pork in 0.45% 219.154 69.598 





    NaCl 25,000 unit In 0.45   





    % NaCl 1 250ml.bag @ 18   





    UNITS/KG/HR 19.224 mls/hr   





    IV .Q13H1M [facility] Rx#:   





    [id number]   


 


  Oral  180 


 


Output:   


 


  Urine 550 800 0


 


  Stool  0 


 


Other:   


 


  Voiding Method Incontinent Incontinent 





 External Catheter External Catheter 


 


  # Voids   1








                       ABP, PAP, CO, CI - Last Documented











Arterial Blood Pressure        107/66

















- Labs


CBC & Chem 7: 


                                 04/02/22 07:29





                                 04/03/22 08:29


Labs: 


                  Abnormal Lab Results - Last 24 Hours (Table)











  04/02/22 04/03/22 04/03/22 Range/Units





  16:21 00:29 08:29 


 


APTT  93.4 H  51.0 H  52.7 H  (22.0-30.0)  sec


 


Sodium     (137-145)  mmol/L


 


BUN     (7-17)  mg/dL


 


Glucose     (74-99)  mg/dL














  04/03/22 Range/Units





  08:29 


 


APTT   (22.0-30.0)  sec


 


Sodium  133 L  (137-145)  mmol/L


 


BUN  21 H  (7-17)  mg/dL


 


Glucose  151 H  (74-99)  mg/dL








                      Microbiology - Last 24 Hours (Table)











 03/31/22 22:30 Nasal Screen MRSA/MSSA - Final





 Nasal Swab

## 2022-04-03 NOTE — P.PN
Subjective





HISTORY OF PRESENTING ILLNESS


Patient is a 71-year-old female with history of coronary artery disease, 

cardiomyopathy with EF 15-20%, severe aortic stenosis, moderate to severe mitral

regurgitation, systolic heart failure, hypertension, hyperlipidemia, depression,

morbid obesity, COVID-19 infection September 2021 with oxygen dependence after 

who presented to [facility] with episodes of feeling flushed and 

mildly nauseous.  She attributes much of her symptoms to changing her medication

from lisinopril to hydrochlorothiazide apparently.  She normally follows in the 

office with Dr. Haynes.  She was treated for congestive heart failure 

previously and mid March however believe she was having a reaction to the 

medications and therefore came to emergency department like urine.  Blood work 

showed elevated proBNP of 5800, hemoglobin 11.9, BUN 20, creatinine 0.8, 

elevated troponins.  Chest x-ray showed mild vascular congestion and 2-D echo 

03/14/2022 showed severe varicose stenosis, moderate to severe mitral 

regurgitation, dilated left ventricle with an EF 15-20%.  Therefore heart 

catheterization was performed 3/31 and showed mild disease of the left and RCA 

99% stenosis which was very proximal.  She underwent attempted PCI however was 

noted to have dissection and therefore procedure was aborted.  Patient denied 

any actual chest pain or pressure during procedure and has done well since then.

 This did appear to be more of a dissection involving the aortic root appeared 

fairly mild on personal review.  Therefore patient was placed in the ICU and 

monitored.  She admits to continued shortness breath however no significant 

chest pain or similar nausea feeling.  





4/2


Patient seen and examined.  Patient denies any chest pain or pressure.  Denies 

any of the nausea type symptoms that brought her into the hospital.  Still 

coughing somewhat.  Has been diuresing with good urine output.  





4/3


Patient seen and examined.  Patient denies any chest pain or pressure.  She is 

very upset this morning and feels weak all over.  She states she is still very 

afraid of any surgery.  Denies any actual lightheadedness.  Blood pressures of 

mainly been 90s over 70s.  Has been continue to diuresis. 





PHYSICAL EXAMINATION


Vital signs reviewed.


CONSTITUTIONAL: No apparent distress, obese, chronically ill appearing, +cough


HEENT: Head is normocephalic. Pupils are equal, round. Sclerae anicteric. Mucous

membranes of the mouth are moist.   No carotid bruit.


CHEST EXAMINATION: Lungs are clear to auscultation. 


HEART EXAMINATION: Regular rate and rhythm. S1, S2 heard. +3/6 systolic murmur, 

no gallops or rub.


ABDOMEN: Soft, nontender. Positive bowel sounds.


EXTREMITIES: 2+ peripheral pulses, no lower extremity edema and no calf 

tenderness.


NEUROLOGIC EXAMINATION: Patient is awake, alert and oriented x3. 





ASSESSMENT


1.  Acute on chronic systolic heart failure


2.  Non-STEMI


3.  Coronary artery disease including 99% RCA stenosis status post failed 

attempted PCI


4.  Hypertension


5.  Hyperlipidemia


6.  Cardiomyopathy mainly nonischemic with CAD out of proportion to 

cardiomyopathy


7.  Severe aortic stenosis


8.  Mitral regurgitation


9.  Oxygen dependence status post COVID-19 infection





PLAN


Long discussion with patient as well as surgeons.  Patient appears to be high 

risk for CABG and aortic valve replacement.  Cath films reviewed with 

possibility of attempted repeat PCI and iFR of LAD with possible PCI of LAD if 

abnormal with TAVR down the road.  Patient agreeable to plan however still 

appears very depressed and currently somewhat lethargic today.  Today's 

presentation appears more psychiatric in nature and does not appear to be 

decompensating from her aortic stenosis or CAD.  Repeat EKG unchanged.  Continue

to monitor closely.  Continue heparin drip.  Load with Plavix 600 mg tomorrow 

morning and PCI tomorrow.





Objective





- Vital Signs


Vital signs: 


                                   Vital Signs











Temp  98.1 F   04/03/22 08:00


 


Pulse  88   04/03/22 10:00


 


Resp  30 H  04/03/22 10:00


 


BP  90/71   04/03/22 10:00


 


Pulse Ox  97   04/03/22 10:00








                                 Intake & Output











 04/02/22 04/03/22 04/03/22





 18:59 06:59 18:59


 


Intake Total 959.154 489.598 80


 


Output Total 550 800 0


 


Balance 409.154 -310.402 80


 


Weight  110.4 kg 


 


Intake:   


 


   240 80


 


    0.9 NACL bolus 500  


 


    Sodium Chloride 0.9% 1, 240 240 80





    000 ml @ 20 mls/hr IV .   





    Q24H [facility] Rx#:[id number]   


 


  Intake, IV Titration 219.154 69.598 





  Amount   


 


    Heparin Sod,Pork in 0.45% 219.154 69.598 





    NaCl 25,000 unit In 0.45   





    % NaCl 1 250ml.bag @ 18   





    UNITS/KG/HR 19.224 mls/hr   





    IV .Q13H1M [facility] Rx#:   





    [id number]   


 


  Oral  180 


 


Output:   


 


  Urine 550 800 0


 


  Stool  0 


 


Other:   


 


  Voiding Method Incontinent Incontinent 





 External Catheter External Catheter 


 


  # Voids   1








                       ABP, PAP, CO, CI - Last Documented











Arterial Blood Pressure        107/66

















- Labs


CBC & Chem 7: 


                                 04/02/22 07:29





                                 04/03/22 08:29


Labs: 


                  Abnormal Lab Results - Last 24 Hours (Table)











  04/02/22 04/03/22 04/03/22 Range/Units





  16:21 00:29 08:29 


 


APTT  93.4 H  51.0 H  52.7 H  (22.0-30.0)  sec


 


Sodium     (137-145)  mmol/L


 


BUN     (7-17)  mg/dL


 


Glucose     (74-99)  mg/dL














  04/03/22 Range/Units





  08:29 


 


APTT   (22.0-30.0)  sec


 


Sodium  133 L  (137-145)  mmol/L


 


BUN  21 H  (7-17)  mg/dL


 


Glucose  151 H  (74-99)  mg/dL








                      Microbiology - Last 24 Hours (Table)











 03/31/22 22:30 Nasal Screen MRSA/MSSA - Final





 Nasal Swab

## 2022-04-03 NOTE — US
EXAMINATION TYPE: US vein mapping BIL

 

DATE OF EXAM: 4/1/2022 7:42 AM

 

COMPARISON: NONE

 

CLINICAL HISTORY: PreOp Cardiac Surgery. pre op cardiac surgery

 

SIDE PERFORMED: bilateral   

 

TECHNIQUE:  Lower extremity saphenous vein is examined and measured utilizing real time linear array 
sonography.

 

Patient History:

Smoker: previous

Heart Disease: yes

Previous DVT: no

Vascular Surgery: no

Discoloration: yes

Hypertension: no

Diabetes: no

Paralysis: no

Varicosities: no

Edema: no

 

DUPLEX FINDINGS:

Greater Saphenous:  Color flow seen

 

Measurements in mm:

 

Right Greater Saphenous:

Groin: 6.1 x 5.3 mm

High Thigh: 4.1 x 4.0 mm

Mid Thigh: 3.6 x 3.2 mm

Above Knee: 3.7 x 3.6 mm

Knee: 3.1 x 3.3 mm

Below Knee: 3.3 x 3.0 mm

Mid Calf: 2.4 x 2.1 mm

At Ankle: 2.2 x 1.9 mm

 

 

Left Greater Saphenous:

Groin: 7.8 x 6.9 mm

High Thigh: 5.4 x 4.7 mm

Mid Thigh: 6.3 x 5.2 mm

Above Knee: 6.0 x 4.6 mm

Knee: 5.6 x 4.6 mm

Below Knee: 5.2 x 4.0 mm

Mid Calf: 3.8 x 3.1 mm

At Ankle: 3.0 x 2.2 mm

 

 

 

 

IMPRESSION:  

1. Bilateral GSV measurements listed above.

2. Performing surgeon to determine viability as conduit.

## 2022-04-03 NOTE — US
EXAMINATION TYPE: US arterial LE single level

 

DATE OF EXAM: 4/1/2022 2:18 PM

 

CLINICAL HISTORY: Ankle Brachial Index (MARGY) . History of hyperlipidemia. Chest pain. Preoperative ca
rdiac surgery.

 

Doppler Waveforms: 

Right: Monophasic to biphasic

Left: Monophasic to Biphasic

 

Ankle-Brachial Indices:

Right: 1.24

Left: 1.24

 

 

 

 

IMPRESSION:  MARGY values are within normal limits. Loss of phasicity bilaterally may be technical as t
here is improved phasicity right dorsalis pedis. Cannot exclude peripheral arterial disease with loss
 of phasicity.

## 2022-04-04 LAB
ALBUMIN SERPL-MCNC: 3 G/DL (ref 3.5–5)
ALBUMIN SERPL-MCNC: 3.3 G/DL (ref 3.5–5)
ALP SERPL-CCNC: 57 U/L (ref 38–126)
ALT SERPL-CCNC: 16 U/L (ref 4–34)
ANION GAP SERPL CALC-SCNC: 3 MMOL/L
ANION GAP SERPL CALC-SCNC: 9 MMOL/L
AST SERPL-CCNC: 21 U/L (ref 14–36)
BASOPHILS # BLD AUTO: 0 K/UL (ref 0–0.2)
BASOPHILS # BLD AUTO: 0.1 K/UL (ref 0–0.2)
BASOPHILS NFR BLD AUTO: 0 %
BASOPHILS NFR BLD AUTO: 0 %
BUN SERPL-SCNC: 38 MG/DL (ref 7–17)
BUN SERPL-SCNC: 41 MG/DL (ref 7–17)
CALCIUM SPEC-MCNC: 8.2 MG/DL (ref 8.4–10.2)
CALCIUM SPEC-MCNC: 8.7 MG/DL (ref 8.4–10.2)
CHLORIDE SERPL-SCNC: 99 MMOL/L (ref 98–107)
CHLORIDE SERPL-SCNC: 99 MMOL/L (ref 98–107)
CO2 SERPL-SCNC: 26 MMOL/L (ref 22–30)
CO2 SERPL-SCNC: 30 MMOL/L (ref 22–30)
EOSINOPHIL # BLD AUTO: 0 K/UL (ref 0–0.7)
EOSINOPHIL # BLD AUTO: 0.1 K/UL (ref 0–0.7)
EOSINOPHIL NFR BLD AUTO: 0 %
EOSINOPHIL NFR BLD AUTO: 1 %
ERYTHROCYTE [DISTWIDTH] IN BLOOD BY AUTOMATED COUNT: 2.37 M/UL (ref 3.8–5.4)
ERYTHROCYTE [DISTWIDTH] IN BLOOD BY AUTOMATED COUNT: 2.92 M/UL (ref 3.8–5.4)
ERYTHROCYTE [DISTWIDTH] IN BLOOD BY AUTOMATED COUNT: 3.06 M/UL (ref 3.8–5.4)
ERYTHROCYTE [DISTWIDTH] IN BLOOD: 14.6 % (ref 11.5–15.5)
ERYTHROCYTE [DISTWIDTH] IN BLOOD: 14.9 % (ref 11.5–15.5)
ERYTHROCYTE [DISTWIDTH] IN BLOOD: 15.2 % (ref 11.5–15.5)
GLUCOSE SERPL-MCNC: 128 MG/DL (ref 74–99)
GLUCOSE SERPL-MCNC: 149 MG/DL (ref 74–99)
HCT VFR BLD AUTO: 21.6 % (ref 34–46)
HCT VFR BLD AUTO: 26.9 % (ref 34–46)
HCT VFR BLD AUTO: 27.7 % (ref 34–46)
HGB BLD-MCNC: 6.9 GM/DL (ref 11.4–16)
HGB BLD-MCNC: 8.6 GM/DL (ref 11.4–16)
HGB BLD-MCNC: 9.1 GM/DL (ref 11.4–16)
LYMPHOCYTES # SPEC AUTO: 1.4 K/UL (ref 1–4.8)
LYMPHOCYTES # SPEC AUTO: 1.4 K/UL (ref 1–4.8)
LYMPHOCYTES NFR SPEC AUTO: 10 %
LYMPHOCYTES NFR SPEC AUTO: 11 %
MCH RBC QN AUTO: 29.2 PG (ref 25–35)
MCH RBC QN AUTO: 29.3 PG (ref 25–35)
MCH RBC QN AUTO: 29.6 PG (ref 25–35)
MCHC RBC AUTO-ENTMCNC: 31.8 G/DL (ref 31–37)
MCHC RBC AUTO-ENTMCNC: 32 G/DL (ref 31–37)
MCHC RBC AUTO-ENTMCNC: 32.7 G/DL (ref 31–37)
MCV RBC AUTO: 90.5 FL (ref 80–100)
MCV RBC AUTO: 91.2 FL (ref 80–100)
MCV RBC AUTO: 92.2 FL (ref 80–100)
MONOCYTES # BLD AUTO: 0.8 K/UL (ref 0–1)
MONOCYTES # BLD AUTO: 0.8 K/UL (ref 0–1)
MONOCYTES NFR BLD AUTO: 5 %
MONOCYTES NFR BLD AUTO: 6 %
NEUTROPHILS # BLD AUTO: 12.4 K/UL (ref 1.3–7.7)
NEUTROPHILS # BLD AUTO: 9.6 K/UL (ref 1.3–7.7)
NEUTROPHILS NFR BLD AUTO: 80 %
NEUTROPHILS NFR BLD AUTO: 83 %
PLATELET # BLD AUTO: 165 K/UL (ref 150–450)
PLATELET # BLD AUTO: 172 K/UL (ref 150–450)
PLATELET # BLD AUTO: 182 K/UL (ref 150–450)
POTASSIUM SERPL-SCNC: 4 MMOL/L (ref 3.5–5.1)
POTASSIUM SERPL-SCNC: 4.5 MMOL/L (ref 3.5–5.1)
PROT SERPL-MCNC: 6 G/DL (ref 6.3–8.2)
SODIUM SERPL-SCNC: 132 MMOL/L (ref 137–145)
SODIUM SERPL-SCNC: 134 MMOL/L (ref 137–145)
WBC # BLD AUTO: 12.1 K/UL (ref 3.8–10.6)
WBC # BLD AUTO: 14.6 K/UL (ref 3.8–10.6)
WBC # BLD AUTO: 15 K/UL (ref 3.8–10.6)

## 2022-04-04 RX ADMIN — PANTOPRAZOLE SODIUM SCH MG: 40 INJECTION, POWDER, FOR SOLUTION INTRAVENOUS at 08:21

## 2022-04-04 RX ADMIN — IPRATROPIUM BROMIDE AND ALBUTEROL SULFATE SCH ML: .5; 3 SOLUTION RESPIRATORY (INHALATION) at 20:37

## 2022-04-04 RX ADMIN — ACETAMINOPHEN PRN MG: 325 TABLET, FILM COATED ORAL at 09:35

## 2022-04-04 RX ADMIN — CEFAZOLIN SCH: 330 INJECTION, POWDER, FOR SOLUTION INTRAMUSCULAR; INTRAVENOUS at 14:47

## 2022-04-04 RX ADMIN — Medication SCH MG: at 18:15

## 2022-04-04 RX ADMIN — BUDESONIDE AND FORMOTEROL FUMARATE DIHYDRATE SCH: 160; 4.5 AEROSOL RESPIRATORY (INHALATION) at 07:46

## 2022-04-04 RX ADMIN — IPRATROPIUM BROMIDE AND ALBUTEROL SULFATE SCH ML: .5; 3 SOLUTION RESPIRATORY (INHALATION) at 07:40

## 2022-04-04 RX ADMIN — ASPIRIN 81 MG CHEWABLE TABLET SCH MG: 81 TABLET CHEWABLE at 09:35

## 2022-04-04 RX ADMIN — FUROSEMIDE SCH MG: 10 INJECTION, SOLUTION INTRAMUSCULAR; INTRAVENOUS at 09:35

## 2022-04-04 RX ADMIN — IPRATROPIUM BROMIDE AND ALBUTEROL SULFATE SCH: .5; 3 SOLUTION RESPIRATORY (INHALATION) at 11:54

## 2022-04-04 RX ADMIN — BUDESONIDE AND FORMOTEROL FUMARATE DIHYDRATE SCH: 160; 4.5 AEROSOL RESPIRATORY (INHALATION) at 20:38

## 2022-04-04 RX ADMIN — SERTRALINE HYDROCHLORIDE SCH MG: 100 TABLET ORAL at 19:56

## 2022-04-04 RX ADMIN — SERTRALINE HYDROCHLORIDE SCH MG: 25 TABLET ORAL at 19:56

## 2022-04-04 RX ADMIN — DIGOXIN SCH MCG: 125 TABLET ORAL at 09:35

## 2022-04-04 RX ADMIN — Medication SCH MG: at 19:55

## 2022-04-04 RX ADMIN — ACETAMINOPHEN PRN MG: 325 TABLET, FILM COATED ORAL at 14:52

## 2022-04-04 RX ADMIN — OXYCODONE HYDROCHLORIDE AND ACETAMINOPHEN SCH MG: 500 TABLET ORAL at 18:15

## 2022-04-04 RX ADMIN — HEPARIN SODIUM PRN UNIT: 1000 INJECTION, SOLUTION INTRAVENOUS; SUBCUTANEOUS at 14:39

## 2022-04-04 RX ADMIN — LORATADINE SCH MG: 10 TABLET ORAL at 18:15

## 2022-04-04 RX ADMIN — HEPARIN SODIUM SCH MLS/HR: 10000 INJECTION, SOLUTION INTRAVENOUS at 06:04

## 2022-04-04 RX ADMIN — IPRATROPIUM BROMIDE AND ALBUTEROL SULFATE SCH: .5; 3 SOLUTION RESPIRATORY (INHALATION) at 16:00

## 2022-04-04 RX ADMIN — ATORVASTATIN CALCIUM SCH MG: 40 TABLET, FILM COATED ORAL at 19:55

## 2022-04-04 RX ADMIN — SPIRONOLACTONE SCH MG: 25 TABLET, FILM COATED ORAL at 09:35

## 2022-04-04 NOTE — P.CN
Psychiatric Consult





- .


Consult date: 04/04/22


Consult:: 





04/04/22 13:48


IDENTIFYING DATA: This patient is a , 71-year-old  female with 

significant history of cardiomyopathy, severe aortic valve stenosis, severe 

mitral regurgitation, systolic heart failure, status post COVID-19 infection, 

who presented to the hospital from [facility] due to concerns 

for ischemic changes on cardiac evaluation.  





HISTORY OF PRESENT ILLNESS: The patient presented to the hospital on 03/31/2022,

brought into the hospital for cardiac evaluation.  Cardiac catheterization 

sedation was performed on 03/31 which showed mild disease of the left and RCA 

and 99% stenosis.  She underwent an attempted PCI but was noted to have 

dissection there for the procedure was aborted.  The patient was placed in ICU 

for monitoring.  Psychiatry has been consulted for a mental health evaluation as

the patient has been displaying and endorsing symptoms of depression and 

anxiety.


Upon evaluation in the ICU, the patient reports that she's been feeling 

increasingly depressed for quite some time.  She states that over the past few 

months she has had significant changes in her life.  His past October, her 

 passed away, and she moved from [place] to [place].  She reports that the 

biggest stressor for her at this time is her daughter's being unable to be 

present with her when she undergoes cardiac procedures.  She expresses that she 

isn't feeling increasingly isolated and lonely.  In regards to depressive 

symptoms, the patient expresses that she has been feeling increasingly hopeless,

having difficulty with sleep, having a decreased appetite, and feeling helpless.

 She vehemently denies any suicidal or homicidal ideation, intention, and/or 

plan.  The patient denies any previous attempts at suicide.  She reports that 

she had previously dealt with depression when she was first  to her 

 many years ago.  She denies any significant history of bipolar disorder 

and reports no history of psychotic symptoms.





PAST PSYCHIATRIC HISTORY: Patient has a history of depression/anxiety.  The 

patient has only tried Zoloft in terms of medication management.  Patient denies

any previous psychiatric hospitalizations. Patient denies any psychiatric 

outpatient follow-up. Patient denies any history of suicide attempts in the 

past.





PAST MEDICAL HISTORY:  


Past Medical History: Heart Failure, GERD/Reflux, Hyperlipidemia, Hypertension, 

Myocardial Infarction (non Q-wave), Pneumonia (COVID-19 pneumonia in September 2021, uses 2 L of home O2 since her diagnosis of COVID-19 pneumonia.)


Additional Past Medical History / Comment(s): Pneumonia 5 years ago.  Sepsis


Last Myocardial Infarction Date:: 3/29/22


History of Any Multi-Drug Resistant Organisms: None Reported


Past Surgical History: Heart Catheterization


Additional Past Surgical History / Comment(s): Cyst removed from right ovary,.  

right ovary removed and part of the left ovary


Past Anesthesia/Blood Transfusion Reactions: No Reported Reaction


Past Psychological History: Depression


Smoking Status: Former smoker


Past Alcohol Use History: None Reported


Past Drug Use History: None Reported





ALLERGIES: Hydralazine, lisinopril





CHEMICAL DEPENDENCY HISTORY: Patient vehemently denies any tobacco, alcohol, 

marijuana, or illicit drug use.





FAMILY PSYCHIATRIC/SUBSTANCE USE HISTORY: No reported family history.  The 

patient does suspect that siblings had depression as well.





SOCIAL HISTORY: Patient was  and was previously living in [place].  She is 

not currently  and living in [place].  She is the oldest of 6 children 

and reports that she grew up with 8 kids in the house.  She reports that 

currently she lives with her dog and has numerous caregivers come in to check in

on her.  She reports that she has people from [employer], [employer], and a 

[employer] .





MENTAL STATUS EXAM: 


General Appearance: Patient appears to be stated age is alert, pleasant, and 

cooperative. Patient appears to have fair hygiene and grooming wearing hospital 

gown with intermittent eye contact.  Obese body habitus.


Behavior: Patient is calmly lying in bed without any agitated behavior. 


Speech: Patient's speech is fluent and nonpressured. 


Mood/Affect: Patient reports their mood is "depressed", affect is congruent and 

dysphoric.


Suicidality/Homicidality:  Patient denies having any suicidal or homicidal 

ideation intent or plan.  


Perceptions: Patient denies any visual hallucinations and denies any auditory 

hallucinations  


Though content/process: There is no evidence of any delusional thought content 

and thought process is linear and goal-directed. 


Memory and concentration: AOX3, grossly intact for the purposes of this session.

Can spell "WORLD" backwards


Judgment and insight: Fair





                                   Vital Signs











Temp  97.8 F   04/04/22 12:00


 


Pulse  67   04/04/22 13:00


 


Resp  13   04/04/22 13:00


 


BP  87/49   04/04/22 13:00


 


Pulse Ox  98   04/04/22 13:00








                                 Intake & Output











 04/03/22 04/04/22 04/04/22





 18:59 06:59 18:59


 


Intake Total 1408.922 260 640


 


Output Total 100 300 100


 


Balance 1308.922 -40 540


 


Weight  111.6 kg 


 


Intake:   


 


  IV 1240 260 540


 


    0.9 NACL bolus 1000  


 


    Sodium Chloride 0.9% 1, 240 260 40





    000 ml @ 20 mls/hr IV .   





    Q24H [facility] Rx#:[id number]   


 


    Sodium Chloride 0.9% 500   500





    ml 500 ml @ 125 mls/hr IV   





    .Q4H [facility] Rx#:[id number]   


 


  Intake, IV Titration 168.922 0 





  Amount   


 


    Heparin Sod,Pork in 0.45% 168.922 0 





    NaCl 25,000 unit In 0.45   





    % NaCl 1 250ml.bag @ 18   





    UNITS/KG/HR 19.224 mls/hr   





    IV .Q13H1M [facility] Rx#:   





    [id number]   


 


  Oral   100


 


Output:   


 


  Urine 100 300 100


 


  Stool 0  


 


Other:   


 


  Voiding Method Incontinent Incontinent Incontinent





 External Catheter External Catheter External Catheter


 


  # Voids 1  








                       ABP, PAP, CO, CI - Last Documented











Arterial Blood Pressure        107/66











                               Laboratory Results











WBC  15.0 k/uL (3.8-10.6)  H  04/04/22  08:57    


 


RBC  2.92 m/uL (3.80-5.40)  L  04/04/22  08:57    


 


Hgb  8.6 gm/dL (11.4-16.0)  L  04/04/22  08:57    


 


Hct  26.9 % (34.0-46.0)  L  04/04/22  08:57    


 


MCV  92.2 fL (80.0-100.0)   04/04/22  08:57    


 


MCH  29.3 pg (25.0-35.0)   04/04/22  08:57    


 


MCHC  31.8 g/dL (31.0-37.0)   04/04/22  08:57    


 


RDW  14.6 % (11.5-15.5)   04/04/22  08:57    


 


Plt Count  172 k/uL (150-450)   04/04/22  08:57    


 


MPV  9.8   04/04/22  08:57    


 


Neutrophils %  83 %  04/04/22  08:57    


 


Lymphocytes %  10 %  04/04/22  08:57    


 


Monocytes %  5 %  04/04/22  08:57    


 


Eosinophils %  0 %  04/04/22  08:57    


 


Basophils %  0 %  04/04/22  08:57    


 


Neutrophils #  12.4 k/uL (1.3-7.7)  H  04/04/22  08:57    


 


Lymphocytes #  1.4 k/uL (1.0-4.8)   04/04/22  08:57    


 


Monocytes #  0.8 k/uL (0-1.0)   04/04/22  08:57    


 


Eosinophils #  0.0 k/uL (0-0.7)   04/04/22  08:57    


 


Basophils #  0.1 k/uL (0-0.2)   04/04/22  08:57    


 


Hypochromasia  Slight   04/04/22  08:57    


 


PT  11.7 sec (9.0-12.0)   04/01/22  10:30    


 


INR  1.1  (<1.2)   04/01/22  10:30    


 


APTT  36.9 sec (22.0-30.0)  H  04/04/22  11:36    


 


Sodium  134 mmol/L (137-145)  L  04/04/22  08:57    


 


Potassium  4.5 mmol/L (3.5-5.1)   04/04/22  08:57    


 


Chloride  99 mmol/L ()   04/04/22  08:57    


 


Carbon Dioxide  26 mmol/L (22-30)   04/04/22  08:57    


 


Anion Gap  9 mmol/L  04/04/22  08:57    


 


BUN  41 mg/dL (7-17)  H  04/04/22  08:57    


 


Creatinine  1.20 mg/dL (0.52-1.04)  H  04/04/22  08:57    


 


Est GFR (CKD-EPI)AfAm  52  (>60 ml/min/1.73 sqM)   04/04/22  08:57    


 


Est GFR (CKD-EPI)NonAf  45  (>60 ml/min/1.73 sqM)   04/04/22  08:57    


 


Glucose  149 mg/dL (74-99)  H  04/04/22  08:57    


 


POC Glucose (mg/dL)  133 mg/dL (75-99)  H  03/31/22  14:23    


 


POC Glu Operater ID  [email or web address]   03/31/22  14:23    


 


Calcium  8.7 mg/dL (8.4-10.2)   04/04/22  08:57    


 


Magnesium  2.1 mg/dL (1.6-2.3)   04/02/22  07:29    


 


Total Bilirubin  1.4 mg/dL (0.2-1.3)  H  04/04/22  08:57    


 


AST  21 U/L (14-36)   04/04/22  08:57    


 


ALT  16 U/L (4-34)   04/04/22  08:57    


 


Alkaline Phosphatase  57 U/L ()   04/04/22  08:57    


 


Total Protein  6.0 g/dL (6.3-8.2)  L  04/04/22  08:57    


 


Albumin  3.3 g/dL (3.5-5.0)  L  04/04/22  08:57    


 


Urine Color  Yellow   03/31/22  16:08    


 


Urine Appearance  Clear  (Clear)   03/31/22  16:08    


 


Urine pH  6.5  (5.0-8.0)   03/31/22  16:08    


 


Ur Specific Gravity  1.043  (1.001-1.035)  H  03/31/22  16:08    


 


Urine Protein  Negative  (Negative)   03/31/22  16:08    


 


Urine Glucose (UA)  Negative  (Negative)   03/31/22  16:08    


 


Urine Ketones  Negative  (Negative)   03/31/22  16:08    


 


Urine Blood  Negative  (Negative)   03/31/22  16:08    


 


Urine Nitrite  Negative  (Negative)   03/31/22  16:08    


 


Urine Bilirubin  Negative  (Negative)   03/31/22  16:08    


 


Urine Urobilinogen  <2.0 mg/dL (<2.0)   03/31/22  16:08    


 


Ur Leukocyte Esterase  Negative  (Negative)   03/31/22  16:08    


 


Hepatitis A IgM Ab  Nonreactive  (Nonreactive)   03/31/22  14:54    


 


Hep Bs Antigen  Nonreactive  (Nonreactive)   03/31/22  14:54    


 


Hep B Core IgM Ab  Nonreactive  (Nonreactive)   03/31/22  14:54    


 


Hep C IgG Ab  Nonreactive  (Nonreactive)   03/31/22  14:54    














IMPRESSIONS: 


Major depressive disorder


Acute bereavement


-Patient's depressive disorder is likely precipitated and exacerbated by her 

deteriorating general medical health, recent loss of her , and recent 

life changes including relocation to [place].





Acute on chronic systolic heart failure


Non-STEMI


CAD including 99% RCA stenosis status post failed attempted PCI


Hypertension


Hyperlipidemia


Cardiomyopathy


Oxygen-dependent status post COVID-19 infection





PLAN: 


-Continue your medical management.





-At this time patient DOES NOT meet criteria for inpatient psychiatric 

admission.





-Delirium precautions recommended with patient including - avoiding use of 

narcotics and CNS sedatives, limit anticholinergic medications when possible, 

frequent re-orientation, minimize use of restraints, open window shades during 

the day and close them at night





-Would recommend the following medication changes/additions: 


We will increase Zoloft to 125 mg by mouth daily at bedtime for management of 

depression.  Zoloft and citalopram and the antidepressants most appropriate for 

a patient with cardiac dysfunction.





-Brief supportive psychotherapy given.





-Will continue to follow along loosely.





04/04/22 13:48

## 2022-04-04 NOTE — P.PN
Subjective


Progress Note Date: 04/04/22





This is a 71-year-old female who was admitted to [facility] with 

symptoms of CHF and evidence of severe aortic stenosis and also moderate mitral 

regurgitation.  Patient also has moderate lung disease.  Patient was evaluated 

by cardiac catheterization and was found to have 99% stenosis of the ostium of 

the right coronary artery and moderate disease in the LAD.  Patient also has 

severe aortic stenosis with a peak gradient of 40 across the valve.  Given her 

LV dysfunction, that was felt to be very significant.  Echocardiogram done in 

this institution again showed severe aortic stenosis and evidence of severe 

cardiomyopathy with ejection fraction of 20-30%.  Attempted stent placement of 

the RCA, resulting in dissection.  The procedure was abandoned surgical consult 

was obtained.  It.  Patient was felt to be high risk candidate and a duration 

was made to attempt percutaneous intervention of the RCA and FFR of the LAD, 

which is being scheduled for tomorrow.  Patient apparently developed a hematoma 

in the right groin after 3 days of heparin.  Heparin was discontinued at was 

restarted again see is relatively stable.  Has chronic complaints of being 

fatigued and tired.  Lungs appeared to be clear.  Heart is regular.  Her urine 

output is fair.  We'll continue current medical therapy and await for 

intervention tomorrow.





Objective





- Vital Signs


Vital signs: 


                                   Vital Signs











Temp  97.9 F   04/04/22 08:00


 


Pulse  81   04/04/22 11:00


 


Resp  22   04/04/22 11:00


 


BP  94/60   04/04/22 11:00


 


Pulse Ox  98   04/04/22 11:00








                                 Intake & Output











 04/03/22 04/04/22 04/04/22





 18:59 06:59 18:59


 


Intake Total 1408.922 260 390


 


Output Total 100 300 50


 


Balance 1308.922 -40 340


 


Weight  111.6 kg 


 


Intake:   


 


  IV 1240 260 290


 


    0.9 NACL bolus 1000  


 


    Sodium Chloride 0.9% 1, 240 260 40





    000 ml @ 20 mls/hr IV .   





    Q24H [facility] Rx#:[id number]   


 


    Sodium Chloride 0.9% 500   250





    ml 500 ml @ 125 mls/hr IV   





    .Q4H [facility] Rx#:[id number]   


 


  Intake, IV Titration 168.922 0 





  Amount   


 


    Heparin Sod,Pork in 0.45% 168.922 0 





    NaCl 25,000 unit In 0.45   





    % NaCl 1 250ml.bag @ 18   





    UNITS/KG/HR 19.224 mls/hr   





    IV .Q13H1M [facility] Rx#:   





    [id number]   


 


  Oral   100


 


Output:   


 


  Urine 100 300 50


 


  Stool 0  


 


Other:   


 


  Voiding Method Incontinent Incontinent Incontinent





 External Catheter External Catheter External Catheter


 


  # Voids 1  








                       ABP, PAP, CO, CI - Last Documented











Arterial Blood Pressure        107/66

















- Exam





GENERAL EXAM: Patient is alert and oriented and doesn't appear to be in any 

acute distress


HEENT: Normocephalic. Normal reaction of pupils, equal size, normal range of 

extraocular motion. No erythema or exudates in the throat.


NECK: No masses, no nuchal rigidity.


CHEST: No chest wall deformity.


LUNGS: Diminished breath sounds at bases


HEART: S1 and S2 normal with no audible mumurs or gallops. Regular rhythm, 

femorals equal on both sides..


ABDOMEN: No hepatosplenomegaly, normal bowel sounds, no guarding or rigidity.


SKIN: No rashes


CENTRAL NERVOUS SYSTEM: No focal deficits.


EXTREMITIES: No cyanosis, clubbing or edema.





- Labs


CBC & Chem 7: 


                                 04/04/22 08:57





                                 04/04/22 08:57


Labs: 


                  Abnormal Lab Results - Last 24 Hours (Table)











  04/03/22 04/03/22 04/04/22 Range/Units





  08:29 16:30 00:07 


 


WBC   16.3 H  14.6 H  (3.8-10.6)  k/uL


 


RBC  3.72 L  3.46 L  3.06 L  (3.80-5.40)  m/uL


 


Hgb  10.8 L  10.0 L  9.1 L  (11.4-16.0)  gm/dL


 


Hct   31.4 L  27.7 L  (34.0-46.0)  %


 


Neutrophils #   14.5 H   (1.3-7.7)  k/uL


 


APTT     (22.0-30.0)  sec


 


Sodium     (137-145)  mmol/L


 


BUN     (7-17)  mg/dL


 


Creatinine     (0.52-1.04)  mg/dL


 


Glucose     (74-99)  mg/dL


 


Total Bilirubin     (0.2-1.3)  mg/dL


 


Total Protein     (6.3-8.2)  g/dL


 


Albumin     (3.5-5.0)  g/dL














  04/04/22 04/04/22 04/04/22 Range/Units





  00:07 08:57 08:57 


 


WBC   15.0 H   (3.8-10.6)  k/uL


 


RBC   2.92 L   (3.80-5.40)  m/uL


 


Hgb   8.6 L   (11.4-16.0)  gm/dL


 


Hct   26.9 L   (34.0-46.0)  %


 


Neutrophils #   12.4 H   (1.3-7.7)  k/uL


 


APTT  19.4 L    (22.0-30.0)  sec


 


Sodium    134 L  (137-145)  mmol/L


 


BUN    41 H  (7-17)  mg/dL


 


Creatinine    1.20 H  (0.52-1.04)  mg/dL


 


Glucose    149 H  (74-99)  mg/dL


 


Total Bilirubin    1.4 H  (0.2-1.3)  mg/dL


 


Total Protein    6.0 L  (6.3-8.2)  g/dL


 


Albumin    3.3 L  (3.5-5.0)  g/dL














  04/04/22 Range/Units





  08:57 


 


WBC   (3.8-10.6)  k/uL


 


RBC   (3.80-5.40)  m/uL


 


Hgb   (11.4-16.0)  gm/dL


 


Hct   (34.0-46.0)  %


 


Neutrophils #   (1.3-7.7)  k/uL


 


APTT  32.4 H  (22.0-30.0)  sec


 


Sodium   (137-145)  mmol/L


 


BUN   (7-17)  mg/dL


 


Creatinine   (0.52-1.04)  mg/dL


 


Glucose   (74-99)  mg/dL


 


Total Bilirubin   (0.2-1.3)  mg/dL


 


Total Protein   (6.3-8.2)  g/dL


 


Albumin   (3.5-5.0)  g/dL














Assessment and Plan


(1) CAD (coronary artery disease)


Current Visit: Yes   Status: Acute   Code(s): I25.10 - ATHSCL HEART DISEASE OF 

NATIVE CORONARY ARTERY W/O ANG PCTRS   SNOMED Code(s): 46453893


   





(2) Severe aortic stenosis


Current Visit: Yes   Status: Acute   Code(s): I35.0 - NONRHEUMATIC AORTIC (

VALVE) STENOSIS   SNOMED Code(s): 915287402


   





(3) Nonischemic cardiomyopathy


Current Visit: Yes   Status: Acute   Code(s): I42.8 - OTHER CARDIOMYOPATHIES   

SNOMED Code(s): 76078040


   





(4) Restrictive lung disease


Current Visit: Yes   Status: Acute   Code(s): J98.4 - OTHER DISORDERS OF LUNG   

SNOMED Code(s): 31479674


   


Plan: 


Patient seemed to be relatively stable.  The dorsum of hematoma in the right 

groin and mild anemia.  Patient is being scheduled for repeat attempt at 

intervention of the lesion in the right coronary artery and possibly FFR of the 

LAD.  Prognosis is guarded

## 2022-04-04 NOTE — CT
EXAMINATION TYPE: CT TAVR Planning

 

DATE OF EXAM: 4/4/2022

 

HISTORY: aortic stenosis

 

CT DLP: 2693.6 mGycm  Automated Exposure Control for Dose Reduction was Utilized.

 

CONTRAST: 

CT scan of the chest, abdomen and pelvis is performed with IV Contrast, patient injected with 125cc m
L of Isovue 370.

 

COMPARISON: CTA chest 2 days ago.

 

TECHNIQUE:  Helical imaging obtained through the chest, abdomen and pelvis during arterial phase kenneth
ricky administration of radiographic contrast intravenously.

 

FINDINGS:

See report from [employer] regarding preprocedural planning

 

CHEST:

 

Lower Neck and Thyroid: No significant findings

 

Lungs: Persistent mild central bilateral alveolar and interstitial edema

 

Central Airway: No significant findings

 

Pleura: Small to moderate size right pleural effusion redemonstrated with associated compressive atel
ectasis

 

Pulmonary Arteries: Persist and enlarged pulmonary arteries consistent with underlying pulmonary cedrick
ry hypertension

 

Heart and Pericardium: Persistent cardiomegaly with severe calcification at level of the aortic root.


 

Lymph Nodes: No significant findings

 

Mediastinum & Esophagus: No significant findings

 

ABDOMEN/PELVIS:

 

Please note arterial phase of the imaging limits detailed evaluation of the solid abdominal organs.

 

Gallbladder: Gallbladder has distended margins and is prominent without surrounding inflammatory carrizales
ge

 

Spleen: No significant findings

 

Kidneys: Central hypodensities in both kidneys favor prominent parapelvic cysts as there is no hydrou
reter.

 

Adrenal Glands: No significant findings

 

Pancreas: No significant findings

 

Liver: No significant findings

 

Bowel and Mesentery: Colonic diverticulosis most prominent appearance in the sigmoid colon.

 

Lymph Nodes: No significant findings

 

Urinary Bladder: No significant findings

 

Pelvic Organs: No significant findings

 

Other: Ill-defined fluid and fat stranding right groin region with more focal moderate-sized right pe
lvic retroperitoneal hematoma causing mass effect or left-sided bladder deviation. This extends into 
the lower abdomen axial image 116 where there is ill-defined fluid still present.

 

 

IMPRESSION: As above. Note is made of moderate size right pelvic retroperitoneal hematoma extending i
nto the lower abdomen presumed from attempted right groin catheter insertion. Correlate clinically.

## 2022-04-04 NOTE — P.PN
Subjective


Progress Note Date: 04/04/22





HISTORY OF PRESENT ILLNESS


This is a 71-year-old female with past medical history of dilated cardiomyopathy

with ejection fraction of 15-20%, severe aortic valve stenosis, mild aortic 

regurgitation with moderate to severe mitral regurgitation, chronic systolic 

heart failure, hyperlipidemia, obesity with possible obstructive sleep apnea and

obesity hypoventilation syndrome, chronic hypoxic respiratory failure on home O2

at 2 L nasal cannula, hypertension hypertensive cardio vascular disease, 

previous Covid 19 infection.  Patient presented to [facility] 

slight elevation of troponin, EKG showed changes suggestive ischemic changes and

was started on aspirin, heparin drip and admitted to the hospital.  Patient was 

seen by cardiology. She was supposed to have a heart catheterization done as an 

outpatient along with [facility] for possible aortic valve replacement and mitral valve

and possible CABG.  Patient underwent coronary angiography finding right 

dominant vessel, 99% ostial stenosis, aortic pressure is 100/70, 40 mm gradient 

across the aortic valve.  Left ventricular end diastolic pressure is 2530.  

Patient was transferred to [facility] for PTCA and stent 

placement of the RCA which was unsuccessful.  Consult with cardiothoracic 

surgery for CABG and valve repair/replacement.





4/1: Patient is seen today in the intensive care unit.  She denies chest pain or

shortness of breath. She complains of anxiety and decreased appetite. Less cough

today.She has been seen by intensivist and cardiothoracic surgery, currently on 

heparin drip, consult was added for dental evaluation and clearance and 

panoramic CT.  Patient has been afebrile, heart rate in the 80s, blood pressure 

101/73, pulse ox 96%.  Repeat blood work reveals CBC is unremarkable.  Potassium

4.0.  Urinalysis negative for infection.  Hepatitis panel negative.  MRSA nasal 

screen is in process.  CT of the chest revealed cardiomegaly with suspected 

pulmonary edema and moderate to marked right sided pleural effusion.  Associated

infection can't be excluded.


Echocardiogram reveals EF of 25-30% with mild concentric left ventricular 

hypertrophy, severe global hypokinesia of the LV, severe aortic stenosis with 

gradient 78.27 mm a new 3/47.94 mmHg, trace mitral regurgitation, mild mitral 

stenosis, mild tricuspid regurgitation, mild pulmonary hypertension, RVSP 43.33 

mmHg.


Carotid ultrasound revealed less than 50% stenosis bilateral carotid systems.





4/2: Patient is sitting up in bed she underwent CTA of the chest for evaluation 

of dissection of the right coronary artery, she denies any chest pain at this 

time, she has no shortness breath, she has no abdominal pain, nausea or 

vomiting, she continues to have some coughing noted from production, she 

continues to be somewhat depressed and anxious and she is not sure what to do, 

she is missing her daughter she stated and she doesn't think her daughter is 

able to come and see her.





4/3: Patient is sitting on the bed in no apparent distress, she continues to be 

quite depressed and anxious about her situation, she has not made up her mind, I

had a long conversation with Dr. Jacobs regarding the plan to pursue the [facility] care at this time, she was deemed high surgical risk per cardiovascular 

surgery service at the current [facility], and she would benefit from a 

transcatheter aortic valve replacement plus reattempted PCI of the RCA, her CT 

angiography of the chest did not show evidence of any dissection of the thoracic

aorta , patient is maintained on Zoloft 100 mg every day, she is mentioned in 

ICU, and the plan to the PCI hopefully in the next 24 hours patient and her 

daughter Janneth are in agreement for conservative approach and they understand the

risks of the procedure and that she will have the TAVR at a later date or during

this hospital admission depends on her symptoms, we will consult Psychiatry for 

depression and possible bipolar disorder.





4/4: Patient remains in intensive care unit, afebrile, heart rate in the 80s, 

blood pressure 101/58, pulse ox 97% on room air.  Patient denies having any 

chest pain but continues to have anxiety issues.  Consult with psychiatry is 

pending.  Patient has been scheduled for CT TAVR protocol for today.  Repeat 

blood work reveals WBC 15, hemoglobin 8.6, platelet count 172.  Sodium 134, BUN 

41 creatinine 1.2.  Blood sugar 149.  Total bilirubin 1.4 otherwise liver 

function tests are normal.  Nasal MRSA screen is negative.





REVIEW OF SYSTEMS


Constitutional: No fever, no chills, no night sweats.  No weight change.  

Positive for generalized weakness, positive for fatigue or lethargy.  No daytime

sleepiness.


HEENT: No headache.  No blurred vision or double vision, no loss of vision.  No 

loss of Hearing, no ringing in the ears, no dizziness.  No nasal drainage or 

congestion.  No epistaxis.  No sore throat.


Lungs: Positive for shortness of breath, positive for cough, no sputum 

production.  No wheezing.  Reports dyspnea on exertion.


Cardiovascular: No chest pain, no lower extremity edema.  No palpitations.  No 

paroxysmal nocturnal dyspnea.  No orthopnea.  No lightheadedness or dizziness.  

No syncopal episodes.


Abdominal: No abdominal pain.  No nausea, vomiting.  No diarrhea.  No 

constipation.  No bloody or tarry stools.  Reports loss of appetite.


Genitourinary: No dysuria, increased frequency, urgency.  No urinary retention.


Musculoskeletal: No myalgias.  Generalized muscle weakness, no gait dysfunction,

no frequent falls.  Positive for back pain.  No neck pain.


Integumentary: No wounds, no lesions.  No rash or pruritus.  Positive for 

bruising.  No change in hair or nails.


Neurologic: No aphasia. No facial droop. No change in mentation. No head injury.

No headache. No paralysis. No paresthesia.


Psychiatric: Positive for depression.  Reports anxiety.  Positive for mood 

swings


Endocrine: No abnormal blood sugars.  Positive for weight change.  No excessive 

sweating or thirst.  No cold intolerance.  





PHYSICAL EXAMINATION


Gen: This is a 71-year-old  female.


HEENT: Head is atraumatic, normocephalic. Pupils equal, round. Sclerae is 

anicteric. 


NECK: Supple. No JVD. No lymphadenopathy. No thyromegaly. 


LUNGS: Decreased breath sounds at the bases, decreased tactile fremitus at the 

right lower third, few rhonchi, no expiratory wheeze, no chest wall tenderness. 

No intercostal retractions.


HEART: First heart sound is depressed, second heart sound is normal, systolic 

ejection murmur 3/6 at the right upper sternal border radiating to the base of 

the neck, systolic ejection murmur 2/6 at the apex rating to the axilla.


ABDOMEN: Soft,, nontender, nondistended, positive bowel sounds, multiple 

ecchymosis.


EXTREMITIES: 1+ pedal edema.  No calf tenderness.  Her cells pedis +1 

bilaterally.  Bilateral hammertoes.


NEUROLOGICAL: Patient is awake, alert and oriented x3. Cranial nerves 2 through 

12 are grossly intact.,  Cranial nerves II-12 appear grossly intact, muscle 

power 4 out of 5 in upper and lower extremities bilaterally.





ASSESSMENT AND PLAN





1.   Non ST elevation  myocardial infarction with history of prior dilated 

cardiomyopathy status post coronary angiography finding right dominant vessel, 

99% ostial stenosis, aortic pressure is 100/70, 40 mm gradient across the aortic

valve.  Left ventricular end diastolic pressure is 2530.  Patient was 

transferred to [facility] for PTCA and stent placement of the 

RCA which was unsuccessful.  Consult with cardiothoracic surgery appreciated.  

Intensivist consultation appreciated..  Continue aspirin 81 mg daily, on Coreg 

6.25 mg twice daily.  Patient is unable to tolerate statins because of 

significant myopathy and plan is to start her on Repatha under 40 mg subcu every

2 weeks.  Status post Dental evaluation with panoramic CT. 





2.  Dilated cardiomyopathy.  continue patient on carvedilol 3.125 mg orally 

twice every day, continue with monitoring input and output and daily weight.  

Continue heparin drip.





3.  Severe aortic valve stenosis with mild aortic regurgitation.  Discussed with

the patient the pros and cons on transcatheter aortic valve replacement versus 

CABG to the RCA with surgical aortic valve replacement. CT TAVR protocol for 

today





4.    Moderate to Severe mitral regurgitation. likely will continue with 

conservative management no plan for mitral valve repair. 





5.  Hyperlipidemia.  Patient unable to tolerate statins.  She was started on 

atorvastatin 40 mg once every day.





6.  Obesity with obstructive sleep apnea and obesity hypoventilation syndrome.  

Patient has not had sleep study done yet. 





7.  Chronic hypoxic respiratory failure secondary to his Covid 19 and underlying

COPD and possible obstructive sleep apnea and hypoventilation syndrome.  Patient

is normally on 2 L nasal cannula.





8.  Acute on chronic systolic heart failure.  Continue Lasix 40 mg IV every 12 

hours, Coreg, Aldactone 25 mg daily.  





9.  Hypertension, hypertensive cardiovascular disease. continue carvedilol 3.125

mg orally twice every day patient is not on ACE-I or ARB due to severe 

hypertension,  





10.  Recurrent depression and generalized anxiety disorder.  Continue Zoloft 50 

mg orally once every day, consult Psychiatry for further evaluation


 


11.  DVT prophylaxis.  continue patient on heparin drip.   





12.  GI  prophylaxis.  Protonix 40 mg IV push daily.





13.  Overall prognosis is very guarded.  





14. Discussed with CTS and Cardiology, her daughter and her niece.





Patient remains in the intensive care unit.  She has been afebrile, heart rate





Impression and plan of care have been directed as dictated by the signing 

physician.  Charla Guevara nurse practitioner acting as scribe for signing 

physician.





Objective





- Vital Signs


Vital signs: 


                                   Vital Signs











Temp  98.1 F   04/04/22 04:00


 


Pulse  81   04/04/22 07:00


 


Resp  22   04/04/22 07:00


 


BP  110/52   04/04/22 07:00


 


Pulse Ox  96   04/04/22 07:00








                                 Intake & Output











 04/03/22 04/04/22 04/04/22





 18:59 06:59 18:59


 


Intake Total 1408.922 260 


 


Output Total 100 300 


 


Balance 1308.922 -40 


 


Weight  111.6 kg 


 


Intake:   


 


  IV 1240 260 


 


    0.9 NACL bolus 1000  


 


    Sodium Chloride 0.9% 1, 240 260 





    000 ml @ 20 mls/hr IV .   





    Q24H [facility] Rx#:[id number]   


 


  Intake, IV Titration 168.922 0 





  Amount   


 


    Heparin Sod,Pork in 0.45% 168.922 0 





    NaCl 25,000 unit In 0.45   





    % NaCl 1 250ml.bag @ 18   





    UNITS/KG/HR 19.224 mls/hr   





    IV .Q13H1M [facility] Rx#:   





    [id number]   


 


Output:   


 


  Urine 100 300 


 


  Stool 0  


 


Other:   


 


  Voiding Method Incontinent Incontinent 





 External Catheter External Catheter 


 


  # Voids 1  








                       ABP, PAP, CO, CI - Last Documented











Arterial Blood Pressure        107/66

















- Labs


CBC & Chem 7: 


                                 04/04/22 08:57





                                 04/04/22 08:57


Labs: 


                  Abnormal Lab Results - Last 24 Hours (Table)











  04/03/22 04/03/22 04/03/22 Range/Units





  08:29 08:29 08:29 


 


WBC     (3.8-10.6)  k/uL


 


RBC    3.72 L  (3.80-5.40)  m/uL


 


Hgb    10.8 L  (11.4-16.0)  gm/dL


 


Hct     (34.0-46.0)  %


 


Neutrophils #     (1.3-7.7)  k/uL


 


APTT  52.7 H    (22.0-30.0)  sec


 


Sodium   133 L   (137-145)  mmol/L


 


BUN   21 H   (7-17)  mg/dL


 


Glucose   151 H   (74-99)  mg/dL














  04/03/22 04/04/22 04/04/22 Range/Units





  16:30 00:07 00:07 


 


WBC  16.3 H  14.6 H   (3.8-10.6)  k/uL


 


RBC  3.46 L  3.06 L   (3.80-5.40)  m/uL


 


Hgb  10.0 L  9.1 L   (11.4-16.0)  gm/dL


 


Hct  31.4 L  27.7 L   (34.0-46.0)  %


 


Neutrophils #  14.5 H    (1.3-7.7)  k/uL


 


APTT    19.4 L  (22.0-30.0)  sec


 


Sodium     (137-145)  mmol/L


 


BUN     (7-17)  mg/dL


 


Glucose     (74-99)  mg/dL

## 2022-04-04 NOTE — P.PN
Subjective


Progress Note Date: 04/04/22











This is a pleasant 71-year-old female patient with biventricular failure, severe

aortic stenosis, mitral regurgitation and coronary artery disease.  The patient 

underwent a cardiac catheterization and the patient was found to have 99% RCA 

stenosis and the patient is post non-ST segment elevation myocardial infarction.

 The patient had an unsuccessful PCI of the RCA.  Initially, she was being 

contemplated for cardiac surgery knowing that she has valvular heart disease and

she was also being looks for a single-vessel bypass surgery.  Ultimately, it was

decided to do another cardiac catheterization tomorrow.  She is known to have 

dilated cardiomyopathy and impaired left ventricular ejection fraction at 

patient presented with acute CHF.  She is currently on O2 at 2 L per minute 

nasal cannula.  She is comfortable.  Nevertheless, she is anxious and she denies

having any chest pain.  Hemodynamically stable on no pressors.  She continues to

be guarded with Lasix and she is on 8040 mg IV every 12 hours.  She is off IV 

heparin as the patient developed an acute hematoma the right femoral artery/tamia

in area at the site of the cardiac catheterization.  The hematoma today is quite

soft and the patient has a hemoglobin of 8.6 which is essentially compatible to 

yesterday of 9.1.  There has been some mild drop in hemoglobin 9 of the 

hemoglobin on 04/03/2022 was 10.0.  Platelet counts are normal.  The hematoma in

the right groin is soft.  The plan is to proceed with another cardiac 

catheterization hopefully within the next 24 hours.  Of notice, isn't elevation 

in the creatinine which is up to 1.2 on today's evaluation.  The neck fluid 

balance over the past 24 hours has been in the order of +98 mL on this patient. 

Based on that, the Lasix has been placed on hold.  Meanwhile, the patient veena

nues to be on IV heparin infusion.  Note that the IV heparin was discontinued.  

Now that there is no active bleeding from the hematoma, cardiology gave orders 

to restart the IV heparin.





Objective





- Vital Signs


Vital signs: 


                                   Vital Signs











Temp  97.8 F   04/04/22 12:00


 


Pulse  67   04/04/22 13:00


 


Resp  13   04/04/22 13:00


 


BP  87/49   04/04/22 13:00


 


Pulse Ox  98   04/04/22 13:00








                                 Intake & Output











 04/03/22 04/04/22 04/04/22





 18:59 06:59 18:59


 


Intake Total 1408.922 260 640


 


Output Total 100 300 100


 


Balance 1308.922 -40 540


 


Weight  111.6 kg 


 


Intake:   


 


  IV 1240 260 540


 


    0.9 NACL bolus 1000  


 


    Sodium Chloride 0.9% 1, 240 260 40





    000 ml @ 20 mls/hr IV .   





    Q24H [facility] Rx#:[id number]   


 


    Sodium Chloride 0.9% 500   500





    ml 500 ml @ 125 mls/hr IV   





    .Q4H [facility] Rx#:[id number]   


 


  Intake, IV Titration 168.922 0 





  Amount   


 


    Heparin Sod,Pork in 0.45% 168.922 0 





    NaCl 25,000 unit In 0.45   





    % NaCl 1 250ml.bag @ 18   





    UNITS/KG/HR 19.224 mls/hr   





    IV .Q13H1M [facility] Rx#:   





    [id number]   


 


  Oral   100


 


Output:   


 


  Urine 100 300 100


 


  Stool 0  


 


Other:   


 


  Voiding Method Incontinent Incontinent Incontinent





 External Catheter External Catheter External Catheter


 


  # Voids 1  








                       ABP, PAP, CO, CI - Last Documented











Arterial Blood Pressure        107/66

















- Exam








CONSTITUTIONAL:  Tearful, cooperative and is in no acute distress.  The patient 

remains anxious.


Head exam was generally normal. There was no scleral icterus or corneal arcus. 

Mucous membranes were moist.


Neck was supple and without jugular venous distension, thyromegaly, or carotid 

bruits. Carotids were easily palpable bilaterally. There was no adenopathy.


RESPIRATORY:  Lungs sounds essentially clear throughout, diminished bilateral 

bases, right greater than left.  Respirations are symmetrical and nonlabored.  

Currently on 2 L nasal cannula with oxygen saturation 96%.  Able to achieve 1500

mL on incentive spirometry.  Strong cough.  


CARDIOVASCULAR:  S1, S2 present, pansystolic murmur present 3/6.  Regular rate 

and rhythm, sinus rhythm on telemetry.  Doppler lower extremity pulses 

bilaterally.  No calf pain or tenderness noted. 


GASTROINTESTINAL:  Abdomen soft, nontender, nondistended.  Active bowel sounds 

present 4 quadrants.  Tolerating diet.


INTEGUMENTARY:  Skin is warm and dry with evidence of good perfusion.  


NEUROLOGIC:  Cranial nerves II through XII intact


MUSKULOSKELETAL:  Able to move all extremities, strength equal bilaterally.


PSYCHIATRIC:  Alert, oriented to person place and time, flat affect.











- Labs


CBC & Chem 7: 


                                 04/04/22 08:57





                                 04/04/22 08:57


Labs: 


                  Abnormal Lab Results - Last 24 Hours (Table)











  04/03/22 04/03/22 04/04/22 Range/Units





  08:29 16:30 00:07 


 


WBC   16.3 H  14.6 H  (3.8-10.6)  k/uL


 


RBC  3.72 L  3.46 L  3.06 L  (3.80-5.40)  m/uL


 


Hgb  10.8 L  10.0 L  9.1 L  (11.4-16.0)  gm/dL


 


Hct   31.4 L  27.7 L  (34.0-46.0)  %


 


Neutrophils #   14.5 H   (1.3-7.7)  k/uL


 


APTT     (22.0-30.0)  sec


 


Sodium     (137-145)  mmol/L


 


BUN     (7-17)  mg/dL


 


Creatinine     (0.52-1.04)  mg/dL


 


Glucose     (74-99)  mg/dL


 


Total Bilirubin     (0.2-1.3)  mg/dL


 


Total Protein     (6.3-8.2)  g/dL


 


Albumin     (3.5-5.0)  g/dL














  04/04/22 04/04/22 04/04/22 Range/Units





  00:07 08:57 08:57 


 


WBC   15.0 H   (3.8-10.6)  k/uL


 


RBC   2.92 L   (3.80-5.40)  m/uL


 


Hgb   8.6 L   (11.4-16.0)  gm/dL


 


Hct   26.9 L   (34.0-46.0)  %


 


Neutrophils #   12.4 H   (1.3-7.7)  k/uL


 


APTT  19.4 L    (22.0-30.0)  sec


 


Sodium    134 L  (137-145)  mmol/L


 


BUN    41 H  (7-17)  mg/dL


 


Creatinine    1.20 H  (0.52-1.04)  mg/dL


 


Glucose    149 H  (74-99)  mg/dL


 


Total Bilirubin    1.4 H  (0.2-1.3)  mg/dL


 


Total Protein    6.0 L  (6.3-8.2)  g/dL


 


Albumin    3.3 L  (3.5-5.0)  g/dL














  04/04/22 04/04/22 Range/Units





  08:57 11:36 


 


WBC    (3.8-10.6)  k/uL


 


RBC    (3.80-5.40)  m/uL


 


Hgb    (11.4-16.0)  gm/dL


 


Hct    (34.0-46.0)  %


 


Neutrophils #    (1.3-7.7)  k/uL


 


APTT  32.4 H  36.9 H  (22.0-30.0)  sec


 


Sodium    (137-145)  mmol/L


 


BUN    (7-17)  mg/dL


 


Creatinine    (0.52-1.04)  mg/dL


 


Glucose    (74-99)  mg/dL


 


Total Bilirubin    (0.2-1.3)  mg/dL


 


Total Protein    (6.3-8.2)  g/dL


 


Albumin    (3.5-5.0)  g/dL














Assessment and Plan


Plan: 











1 acute hypoxic respiratory failure, currently on 2 L of oxygen by nasal 

cannula, essentially secondary to valvular heart disease/CAD and secondary 

decompensated heart failure.





2 acute non-ST segment elevation myocardial infarction.  The patient is post 

cardiac catheterization and the patient is a 99% RCA stenosis and the patient is

status post unsuccessful PCI to RCA





3 severe aortic valve stenosis with a peak and mean gradient of 78 and 47 mmHg





4 mitral regurgitation





5 dilated cardiomyopathy with impaired left ventricular ejection fraction 

systolic heart failure with an ejection fraction of 25-30%





6 bilateral pleural effusion secondary to above





7 hypertension





8 hyperlipidemia





9 COVID 19 pneumonia back in September 2021





10 right groin hematoma at a set of cardiac catheterization, currently soft





11 acute blood loss anemia with a hemoglobin of 8.6, post development of a 

hematoma in the right groin





12 acute kidney injury in the creatinine is up to 1.2





13 medical debility seconds above-mentioned comorbidities





Plan





Hold Lasix and monitor renal function


Management of IV heparin per cardiology.  They have given the okay to restart IV

heparin for now.  Monitor the hemoglobin the hematoma in the right groin


Continue aspirin and statins and beta blockers


Cardiothoracic surgeries on the case regarding possibility of bypass/aortic 

valve replacement versus PCI and possibly 80 aVR procedure regarding the aortic 

valve.  Accordingly, a CT of the chest was completed and the patient has 

cardiomegaly and pulmonary edema and moderate sized right-sided pleural 

effusion.


Long-term prognosis poor baseline above-mentioned comorbidities.  We'll continue

to follow make further recommendations from a pulmonary standpoint based on her 

progression.

## 2022-04-04 NOTE — P.PN
Subjective


Progress Note Date: 04/04/22


Principal diagnosis: 





Coronary artery disease with 99% RCA stenosis, non-STEMI this admission, status 

post unsuccessful PCI to the right coronary artery, severe aortic valve stenosi

s, trace mitral valve regurgitation with mild mitral valve stenosis, dilated 

cardiomyopathy, acute on chronic systolic heart failure.  Past medical history 

significant for hypertension, hyperlipidemia, chronic hypoxic respiratory 

failure on 2 L nasal cannula home oxygen, COVID-19 pneumonia in September 2021, 

remains unvaccinated for COVID-19, morbid obesity, depression, remote history of

nicotine dependence, severe restrictive lung disease. 





The patient was seen in follow-up today 04/04/2022 at her bedside in the 

intensive care unit.  The patient is laying in bed, continues to be very teary 

eyed and reports she continues to feel depressed.  Denies any complaints of 

shortness of breath, although is complaining of pain to her right groin and a 

frequent cough. Oxygen saturations are 96% on 2 L nasal cannula and she 

continues to achieve 1500 mL on her incentive spirometry with encouragement.  

The patient has been deemed a high surgical risk for open heart surgery, and the

plan is to proceed with PCI of the right coronary artery by Dr. Jacobs with 

iFR of the LAD with plans to proceed with TAVR workup in the near future.  The 

plan has been discussed between Dr. Knight, Dr. Jacobs, Dr. cMcarty, the patient 

and the patient daughter.  All agree to the current treatment plan.  Heparin d

rip remained infusing per protocol which is being managed by cardiology.  

Laboratory results morning show a WBC count of 14.6, hemoglobin 9.1, and 

platelets 182.





Objective





- Vital Signs


Vital signs: 


                                   Vital Signs











Temp  97.9 F   04/04/22 08:00


 


Pulse  79   04/04/22 08:00


 


Resp  21   04/04/22 08:00


 


BP  106/63   04/04/22 08:00


 


Pulse Ox  96   04/04/22 08:00








                                 Intake & Output











 04/03/22 04/04/22 04/04/22





 18:59 06:59 18:59


 


Intake Total 1408.922 260 120


 


Output Total 100 300 50


 


Balance 1308.922 -40 70


 


Weight  111.6 kg 


 


Intake:   


 


  IV 1240 260 20


 


    0.9 NACL bolus 1000  


 


    Sodium Chloride 0.9% 1, 240 260 20





    000 ml @ 20 mls/hr IV .   





    Q24H [facility] Rx#:[id number]   


 


  Intake, IV Titration 168.922 0 





  Amount   


 


    Heparin Sod,Pork in 0.45% 168.922 0 





    NaCl 25,000 unit In 0.45   





    % NaCl 1 250ml.bag @ 18   





    UNITS/KG/HR 19.224 mls/hr   





    IV .Q13H1M [facility] Rx#:   





    [id number]   


 


  Oral   100


 


Output:   


 


  Urine 100 300 50


 


  Stool 0  


 


Other:   


 


  Voiding Method Incontinent Incontinent Incontinent





 External Catheter External Catheter External Catheter


 


  # Voids 1  








                       ABP, PAP, CO, CI - Last Documented











Arterial Blood Pressure        107/66

















- Exam





CONSTITUTIONAL:  Tearful, cooperative and is in no acute distress.


RESPIRATORY:  Lungs sounds essentially clear throughout, diminished bilateral 

bases, right greater than left.  Respirations are symmetrical and nonlabored.  

Currently on 2 L nasal cannula with oxygen saturation 96%.  Able to achieve 1500

mL on incentive spirometry.  Strong cough.  


CARDIOVASCULAR:  S1, S2 present, pansystolic murmur present 3/6.  Regular rate 

and rhythm, sinus rhythm on telemetry.  Doppler lower extremity pulses 

bilaterally.  No calf pain or tenderness noted. 


GASTROINTESTINAL:  Abdomen soft, nontender, nondistended.  Active bowel sounds 

present 4 quadrants.  Tolerating diet.


INTEGUMENTARY:  Skin is warm and dry with evidence of good perfusion.  


NEUROLOGIC:  Cranial nerves II through XII intact


MUSKULOSKELETAL:  Able to move all extremities, strength equal bilaterally.


PSYCHIATRIC:  Alert, oriented to person place and time, flat affect.








- Allied health notes


Allied health notes reviewed: nursing





- Labs


CBC & Chem 7: 


                                 04/04/22 00:07





                                 04/03/22 08:29


Labs: 


                  Abnormal Lab Results - Last 24 Hours (Table)











  04/03/22 04/03/22 04/04/22 Range/Units





  08:29 16:30 00:07 


 


WBC   16.3 H  14.6 H  (3.8-10.6)  k/uL


 


RBC  3.72 L  3.46 L  3.06 L  (3.80-5.40)  m/uL


 


Hgb  10.8 L  10.0 L  9.1 L  (11.4-16.0)  gm/dL


 


Hct   31.4 L  27.7 L  (34.0-46.0)  %


 


Neutrophils #   14.5 H   (1.3-7.7)  k/uL


 


APTT     (22.0-30.0)  sec














  04/04/22 Range/Units





  00:07 


 


WBC   (3.8-10.6)  k/uL


 


RBC   (3.80-5.40)  m/uL


 


Hgb   (11.4-16.0)  gm/dL


 


Hct   (34.0-46.0)  %


 


Neutrophils #   (1.3-7.7)  k/uL


 


APTT  19.4 L  (22.0-30.0)  sec














Assessment and Plan


Assessment: 





1.  Coronary artery disease with 99% RCA stenosis, non-STEMI this admission, s

tatus post unsuccessful PCI to the right coronary artery


2.  Severe aortic valve stenosis, peak/mean gradient 78.27/47.94 mmHg


3.  Trace mitral regurgitation with mild mitral stenosis on transthoracic 

echocardiogram, peak/mean gradients of 14.48/6.4 mmHg, 


4.  Dilated cardiomyopathy


5.  Acute on chronic systolic heart failure, EF 25-30% on transthoracic 

echocardiogram


6.  History of hypertension


7.  Hyperlipidemia, cholesterol 333,  


8.  Chronic hypoxic respiratory failure on 2 L nasal cannula home oxygen


9.  COVID-19 pneumonia in September 2021


10.  Remains unvaccinated for COVID-19


11.  Morbid obesity


12.  Depression, currently on Zoloft


13.  Remote history of nicotine dependence


14.  Severe restrictive lung disease, FEV1 45% of predicted 


15.  Generalized debility, patient uses walker for ambulation


Plan: 





1.  Continue aspirin, statin and beta blocker. Heparin drip managed by 

cardiology.


2.  Continue diuresis, heart failure management per cardiology.


3.  STS risk calculated and was discussed with the patient by Dr Mccarty and Dr. Jacobs from cardiology.  Patient is very high risk for surgery due to multiple

comorbidities.  The patient is scheduled for PCI of the right coronary artery 

and iFR of the LAD to be scheduled tomorrow 04/05/2022 to be performed by Dr. Jacobs.  We will need to obtain a CT TAVR protocol prior to proceeding with 

TAVR.


4.  Medical management of other comorbidities per primary care service.


5.  More recommendations to follow based on patient's clinical course.





Time with Patient: Greater than 30

## 2022-04-05 LAB
ALBUMIN SERPL-MCNC: 2.8 G/DL (ref 3.5–5)
ALP SERPL-CCNC: 39 U/L (ref 38–126)
ALT SERPL-CCNC: 12 U/L (ref 4–34)
ANION GAP SERPL CALC-SCNC: 3 MMOL/L
AST SERPL-CCNC: 33 U/L (ref 14–36)
BASOPHILS # BLD AUTO: 0 K/UL (ref 0–0.2)
BASOPHILS NFR BLD AUTO: 0 %
BUN SERPL-SCNC: 36 MG/DL (ref 7–17)
CALCIUM SPEC-MCNC: 8.1 MG/DL (ref 8.4–10.2)
CHLORIDE SERPL-SCNC: 102 MMOL/L (ref 98–107)
CO2 SERPL-SCNC: 29 MMOL/L (ref 22–30)
EOSINOPHIL # BLD AUTO: 0.1 K/UL (ref 0–0.7)
EOSINOPHIL NFR BLD AUTO: 1 %
ERYTHROCYTE [DISTWIDTH] IN BLOOD BY AUTOMATED COUNT: 2.55 M/UL (ref 3.8–5.4)
ERYTHROCYTE [DISTWIDTH] IN BLOOD: 14.7 % (ref 11.5–15.5)
GLUCOSE SERPL-MCNC: 101 MG/DL (ref 74–99)
HCT VFR BLD AUTO: 23.6 % (ref 34–46)
HGB BLD-MCNC: 7.6 GM/DL (ref 11.4–16)
LYMPHOCYTES # SPEC AUTO: 0.9 K/UL (ref 1–4.8)
LYMPHOCYTES NFR SPEC AUTO: 10 %
MCH RBC QN AUTO: 29.9 PG (ref 25–35)
MCHC RBC AUTO-ENTMCNC: 32.3 G/DL (ref 31–37)
MCV RBC AUTO: 92.5 FL (ref 80–100)
MONOCYTES # BLD AUTO: 0.7 K/UL (ref 0–1)
MONOCYTES NFR BLD AUTO: 7 %
NEUTROPHILS # BLD AUTO: 7.9 K/UL (ref 1.3–7.7)
NEUTROPHILS NFR BLD AUTO: 81 %
PLATELET # BLD AUTO: 152 K/UL (ref 150–450)
POTASSIUM SERPL-SCNC: 4.6 MMOL/L (ref 3.5–5.1)
PROT SERPL-MCNC: 5.6 G/DL (ref 6.3–8.2)
SODIUM SERPL-SCNC: 134 MMOL/L (ref 137–145)
WBC # BLD AUTO: 9.8 K/UL (ref 3.8–10.6)

## 2022-04-05 RX ADMIN — HEPARIN SODIUM SCH: 10000 INJECTION, SOLUTION INTRAVENOUS at 06:47

## 2022-04-05 RX ADMIN — BUDESONIDE AND FORMOTEROL FUMARATE DIHYDRATE SCH: 160; 4.5 AEROSOL RESPIRATORY (INHALATION) at 19:14

## 2022-04-05 RX ADMIN — HEPARIN SODIUM SCH: 10000 INJECTION, SOLUTION INTRAVENOUS at 07:47

## 2022-04-05 RX ADMIN — OXYCODONE HYDROCHLORIDE AND ACETAMINOPHEN SCH MG: 500 TABLET ORAL at 08:22

## 2022-04-05 RX ADMIN — BUDESONIDE AND FORMOTEROL FUMARATE DIHYDRATE SCH: 160; 4.5 AEROSOL RESPIRATORY (INHALATION) at 09:28

## 2022-04-05 RX ADMIN — IPRATROPIUM BROMIDE AND ALBUTEROL SULFATE SCH: .5; 3 SOLUTION RESPIRATORY (INHALATION) at 15:12

## 2022-04-05 RX ADMIN — CEFAZOLIN SCH: 330 INJECTION, POWDER, FOR SOLUTION INTRAMUSCULAR; INTRAVENOUS at 15:16

## 2022-04-05 RX ADMIN — ASPIRIN 81 MG CHEWABLE TABLET SCH MG: 81 TABLET CHEWABLE at 08:22

## 2022-04-05 RX ADMIN — IPRATROPIUM BROMIDE AND ALBUTEROL SULFATE SCH: .5; 3 SOLUTION RESPIRATORY (INHALATION) at 11:41

## 2022-04-05 RX ADMIN — DIGOXIN SCH MCG: 125 TABLET ORAL at 08:22

## 2022-04-05 RX ADMIN — IPRATROPIUM BROMIDE AND ALBUTEROL SULFATE SCH: .5; 3 SOLUTION RESPIRATORY (INHALATION) at 19:14

## 2022-04-05 RX ADMIN — Medication SCH MG: at 17:45

## 2022-04-05 RX ADMIN — SPIRONOLACTONE SCH MG: 25 TABLET, FILM COATED ORAL at 08:22

## 2022-04-05 RX ADMIN — Medication SCH MG: at 20:25

## 2022-04-05 RX ADMIN — PANTOPRAZOLE SODIUM SCH MG: 40 INJECTION, POWDER, FOR SOLUTION INTRAVENOUS at 08:22

## 2022-04-05 RX ADMIN — SERTRALINE HYDROCHLORIDE SCH MG: 25 TABLET ORAL at 19:54

## 2022-04-05 RX ADMIN — SERTRALINE HYDROCHLORIDE SCH MG: 100 TABLET ORAL at 19:54

## 2022-04-05 RX ADMIN — ATORVASTATIN CALCIUM SCH MG: 40 TABLET, FILM COATED ORAL at 19:53

## 2022-04-05 RX ADMIN — IPRATROPIUM BROMIDE AND ALBUTEROL SULFATE SCH: .5; 3 SOLUTION RESPIRATORY (INHALATION) at 09:28

## 2022-04-05 RX ADMIN — Medication SCH MG: at 08:22

## 2022-04-05 RX ADMIN — ACETAMINOPHEN PRN MG: 325 TABLET, FILM COATED ORAL at 15:00

## 2022-04-05 RX ADMIN — LORATADINE SCH MG: 10 TABLET ORAL at 08:22

## 2022-04-05 RX ADMIN — OXYCODONE HYDROCHLORIDE AND ACETAMINOPHEN SCH MG: 500 TABLET ORAL at 17:45

## 2022-04-05 NOTE — P.PN
Progress Note - Text


Progress Note Date: 04/05/22





Interval History:


Patient was seen resting in bed and was directable and agreeable to speak with 

writer in her room.  The patient reports that she feels very tired.  She states 

that she had a lot of activity in regards to her catheter and therefore feels 

"spent."  She is currently denying any suicidal or homicidal ideation, 

intention, and/or plan.  She denies any auditory or visual hallucinations.  The 

patient expresses that she has been debating whether to continue with a full 

code or whether to just let things end.  She has decided to stay as a full code 

at this time.  She reports that she does not want to look like that she is 

giving up.  She reports that her appetite has improved slightly.  She states 

that sleep was poor due to numerous checks.  She otherwise reports no 

significant side effects of the increase in Zoloft.





Mental Status Exam:


General Appearance: Patient appears to be stated age is alert, pleasant, and 

cooperative. Patient appears to have fair hygiene and grooming wearing hospital 

gown with intermittent eye contact.  Obese body habitus.


Behavior: Patient is calmly lying in bed without any agitated behavior. 


Speech: Patient's speech is fluent and nonpressured. 


Mood/Affect: Patient reports their mood is "Tired", affect is congruent and s

omnolent. 


Suicidality/Homicidality:  Patient denies having any suicidal or homicidal 

ideation intent or plan.  


Perceptions: Patient denies any visual hallucinations and denies any auditory 

hallucinations  


Though content/process: There is no evidence of any delusional thought content 

and thought process is linear and goal-directed. 


Memory and concentration: AOX3, grossly intact for the purposes of this session.

Can spell "WORLD" backwards


Judgment and insight: Fair





                                   Vital Signs











Temp  98.0 F   04/05/22 08:00


 


Pulse  64   04/05/22 10:00


 


Resp  44 H  04/05/22 10:00


 


BP  98/56   04/05/22 10:00


 


Pulse Ox  97   04/05/22 10:00








                                 Intake & Output











 04/04/22 04/05/22 04/05/22





 18:59 06:59 18:59


 


Intake Total 1557.097 981.178 60


 


Output Total 400 575 0


 


Balance 1157.097 406.178 60


 


Weight  113.3 kg 


 


Intake:   


 


  IV 1165 305 60


 


    Sodium Chloride 0.9% 1, 40  





    000 ml @ 20 mls/hr IV .   





    Q24H [facility] Rx#:[id number]   


 


    Sodium Chloride 0.9% 500 1125 305 60





    ml 500 ml @ 125 mls/hr IV   





    .Q4H [facility] Rx#:[id number]   


 


  Intake, IV Titration 92.097 100.178 





  Amount   


 


    Heparin Sod,Pork in 0.45% 92.097 100.178 





    NaCl 25,000 unit In 0.45   





    % NaCl 1 250ml.bag @ 18   





    UNITS/KG/HR 19.224 mls/hr   





    IV .Q13H1M [facility] Rx#:   





    [id number]   


 


  Oral 300  


 


  Blood Product  576 


 


    Rc Pheresis As-3  Unit  288 





    [id number]   


 


Output:   


 


  Urine 400 575 0


 


Other:   


 


  Voiding Method Incontinent Incontinent Incontinent





 External Catheter External Catheter External Catheter








                       ABP, PAP, CO, CI - Last Documented











Arterial Blood Pressure        107/66











                       Laboratory Results - Last 24 Hours











  04/04/22 04/04/22 04/04/22





  20:40 21:10 21:10


 


WBC   12.1 H 


 


RBC   2.37 L 


 


Hgb   6.9 L* D 


 


Hct   21.6 L 


 


MCV   91.2 


 


MCH   29.2 


 


MCHC   32.0 


 


RDW   15.2 


 


Plt Count   165 


 


MPV   9.4 


 


Neutrophils %   80 


 


Lymphocytes %   11 


 


Monocytes %   6 


 


Eosinophils %   1 


 


Basophils %   0 


 


Neutrophils #   9.6 H 


 


Lymphocytes #   1.4 


 


Monocytes #   0.8 


 


Eosinophils #   0.1 


 


Basophils #   0.0 


 


Hypochromasia   Slight 


 


APTT  63.7 H  


 


Sodium    132 L


 


Potassium    4.0


 


Chloride    99


 


Carbon Dioxide    30


 


Anion Gap    3


 


BUN    38 H


 


Creatinine    1.01


 


Est GFR (CKD-EPI)AfAm    64


 


Est GFR (CKD-EPI)NonAf    56


 


Glucose    128 H


 


Calcium    8.2 L


 


Phosphorus    3.6


 


Total Bilirubin   


 


AST   


 


ALT   


 


Alkaline Phosphatase   


 


Total Protein   


 


Albumin    3.0 L


 


Blood Type   


 


Blood Type Confirm   


 


Blood Type Recheck   


 


Bld Type Recheck Status   


 


Antibody Screen   


 


Crossmatch   


 


Spec Expiration Date   














  04/04/22 04/05/22 04/05/22





  23:10 00:53 07:33


 


WBC   


 


RBC   


 


Hgb   


 


Hct   


 


MCV   


 


MCH   


 


MCHC   


 


RDW   


 


Plt Count   


 


MPV   


 


Neutrophils %   


 


Lymphocytes %   


 


Monocytes %   


 


Eosinophils %   


 


Basophils %   


 


Neutrophils #   


 


Lymphocytes #   


 


Monocytes #   


 


Eosinophils #   


 


Basophils #   


 


Hypochromasia   


 


APTT   


 


Sodium    134 L


 


Potassium    4.6


 


Chloride    102


 


Carbon Dioxide    29


 


Anion Gap    3


 


BUN    36 H


 


Creatinine    0.63


 


Est GFR (CKD-EPI)AfAm    >90


 


Est GFR (CKD-EPI)NonAf    90


 


Glucose    101 H


 


Calcium    8.1 L


 


Phosphorus   


 


Total Bilirubin    1.3


 


AST    33


 


ALT    12


 


Alkaline Phosphatase    39


 


Total Protein    5.6 L


 


Albumin    2.8 L


 


Blood Type  O Positive  


 


Blood Type Confirm   O Positive 


 


Blood Type Recheck  No Previous Record  


 


Bld Type Recheck Status  CABO Indicated  


 


Antibody Screen  NEGATIVE  


 


Crossmatch  See Detail  


 


Spec Expiration Date  04/07/2022 - 2310 04/05/22





  08:17


 


WBC  9.8


 


RBC  2.55 L


 


Hgb  7.6 L


 


Hct  23.6 L


 


MCV  92.5


 


MCH  29.9


 


MCHC  32.3


 


RDW  14.7


 


Plt Count  152


 


MPV  9.3


 


Neutrophils %  81


 


Lymphocytes %  10


 


Monocytes %  7


 


Eosinophils %  1


 


Basophils %  0


 


Neutrophils #  7.9 H


 


Lymphocytes #  0.9 L


 


Monocytes #  0.7


 


Eosinophils #  0.1


 


Basophils #  0.0


 


Hypochromasia  Slight


 


APTT 


 


Sodium 


 


Potassium 


 


Chloride 


 


Carbon Dioxide 


 


Anion Gap 


 


BUN 


 


Creatinine 


 


Est GFR (CKD-EPI)AfAm 


 


Est GFR (CKD-EPI)NonAf 


 


Glucose 


 


Calcium 


 


Phosphorus 


 


Total Bilirubin 


 


AST 


 


ALT 


 


Alkaline Phosphatase 


 


Total Protein 


 


Albumin 


 


Blood Type 


 


Blood Type Confirm 


 


Blood Type Recheck 


 


Bld Type Recheck Status 


 


Antibody Screen 


 


Crossmatch 


 


Spec Expiration Date 











Assessment


Major depressive disorder


Acute bereavement


-Patient's depressive disorder is likely precipitated and exacerbated by her 

deteriorating general medical health, recent loss of her , and recent 

life changes including relocation to [place].





Acute on chronic systolic heart failure


Non-STEMI


CAD including 99% RCA stenosis status post failed attempted PCI


Hypertension


Hyperlipidemia


Cardiomyopathy


Oxygen-dependent status post COVID-19 infection





Plan:


-Continue your medical management.





-At this time patient DOES NOT meet criteria for inpatient psychiatric 

admission.





-Delirium precautions recommended with patient including - avoiding use of 

narcotics and CNS sedatives, limit anticholinergic medications when possible, 

frequent re-orientation, minimize use of restraints, open window shades during 

the day and close them at night





-Would recommend the following medication changes/additions: 


Continue Zoloft 125 mg by mouth daily at bedtime for management of depression.  

Zoloft and citalopram and the antidepressants most appropriate for a patient 

with cardiac dysfunction. Zoloft may contribute to bleeding however benefits 

outweigh risk at this time. Plan discussed with patient.  





-Brief supportive psychotherapy given.





-Will continue to follow along loosely.

## 2022-04-05 NOTE — P.GSCN
History of Present Illness


Consult date: 04/05/22


Reason for Consult: 





Retroperitoneal hematoma status post cardiac catheterization


Requesting physician: Pricilla Power


History of present illness: 





This is a 71-year-old female with multiple comorbidities including history of 

dilated cardiomyopathy with ejection fraction of 15-20%, severe aortic valve 

stenosis, mild aortic regurgitation with moderate to severe mitral 

regurgitation, chronic systolic heart failure, hyperlipidemia, obesity, chronic 

hypoxic respiratory failure on home oxygen, and hypertension.  Apparently the 

patient had initially presented to [facility] with mild 

elevation of troponins with EKG changes was started on aspirin and a heparin 

drip was seen by cardiology, underwent cardiac catheterization on 3/31/22 with 

findings of 99% stenosis of ostial RCA,  due to the findings on cardiac 

catheterization patient was transferred to [facility] for 

further evaluation and to undergo possible PCI of the right coronary artery.  

Patient was taken to the cardiac cath lab however was unsuccessful at PCI.    

Cardiothoracic surgery was consulted for further evaluation and possible 

treatment.  She had a CT of the chest that revealed moderate right-sided pleural

effusion with some fluid in the fissure.  Yesterday patient underwent a chest 

abdomen pelvis CTA   stating there was a moderate-sized right pelvic 

retroperitoneal hematoma extending into the lower abdomen presumed from 

attempted right groin catheter insertion.  Vascular surgery has been consulted 

for the above.patient had a drop in her hemoglobin yesterday from 9.1-6.9, she 

is status post 1 unit PRBC transfusion.  Current labs are pending.  Apparently 

she had also been on a heparin drip.  





Review of Systems





A 14 point review of systems was completed all pertinent positives and negatives

as stated in the HPI.





Past Medical History


Past Medical History: Heart Failure, GERD/Reflux, Hyperlipidemia, Hypertension, 

Myocardial Infarction (non Q-wave), Pneumonia (COVID-19 pneumonia in September 2021, uses 2 L of home O2 since her diagnosis of COVID-19 pneumonia.)


Additional Past Medical History / Comment(s): Pneumonia 5 years ago.  Sepsis


Last Myocardial Infarction Date:: 3/29/22


History of Any Multi-Drug Resistant Organisms: None Reported


Past Surgical History: Heart Catheterization


Additional Past Surgical History / Comment(s): Cyst removed from right ovary,.  

right ovary removed and part of the left ovary


Past Anesthesia/Blood Transfusion Reactions: No Reported Reaction


Past Psychological History: Depression


Smoking Status: Former smoker


Past Alcohol Use History: None Reported


Past Drug Use History: None Reported





- Past Family History


  ** Mother


Additional Family Medical History / Comment(s): Varicous veins.  Patient states 

"she had alot of heart problems"





  ** Father


Additional Family Medical History / Comment(s): Patient states "my dad had a lot

of heart issues."





Medications and Allergies


                                Home Medications











 Medication  Instructions  Recorded  Confirmed  Type


 


Carvedilol [Coreg] 6.25 mg PO BID 03/31/22 03/31/22 History


 


Digoxin [Lanoxin] 125 mcg PO DAILY 03/31/22 03/31/22 History


 


Furosemide [Lasix] 40 mg PO BID 03/31/22 03/31/22 History


 


Ipratropium-Albuterol Nebulize 3 ml INHALATION RT-Q6H PRN 03/31/22 03/31/22 

History





[Duoneb 0.5 mg-3 mg/3 ml Soln]    


 


Melatonin 3 mg PO HS 03/31/22 03/31/22 History


 


Pantoprazole Sodium [Protonix] 40 mg PO DAILY 03/31/22 03/31/22 History


 


Sertraline [Zoloft] 50 mg PO DAILY 03/31/22 03/31/22 History


 


Spironolactone [Aldactone] 25 mg PO DAILY 03/31/22 03/31/22 History


 


polyethylene glycoL 3350 [Miralax] 17 gm PO DAILY PRN 03/31/22 03/31/22 History








                                    Allergies











Allergy/AdvReac Type Severity Reaction Status Date / Time


 


hydralazine AdvReac  Syncope, Verified 03/31/22 15:40





   hot flashes  


 


lisinopril AdvReac  Cough Verified 03/31/22 15:40














Surgical - Exam


                                   Vital Signs











Pulse Resp


 


 85   14 


 


 03/31/22 13:25  03/31/22 13:25














General appearance: The patient is alert, oriented, appears in no acute distr

ess.


HET: Head is normocephalic and atraumatic.  Pupils are equal and reactive.  


Neck: Supple without lymphadenopathy.  Trachea midline.  No audible carotid 

bruit.


Heart: S1 S2.  Systolic murmur noted.  Regular rate and rhythm.


Lungs: Clear to auscultation bilaterally.


Abdomen: Soft, nontender, nondistended.


Extremities: Normal skin color and turgor.  Right groin with ecchymosis, soft 

hematoma noted.  No active bleeding.  Minimal tenderness.  Bilateral lower 

extremity edema.


Neurological: No focal deficits.  Strength and sensation are grossly intact.





Results





- Labs





                                 04/05/22 08:17





                                 04/05/22 07:33


                  Abnormal Lab Results - Last 24 Hours (Table)











  04/04/22 04/04/22 04/04/22 Range/Units





  08:57 08:57 08:57 


 


WBC  15.0 H    (3.8-10.6)  k/uL


 


RBC  2.92 L    (3.80-5.40)  m/uL


 


Hgb  8.6 L    (11.4-16.0)  gm/dL


 


Hct  26.9 L    (34.0-46.0)  %


 


Neutrophils #  12.4 H    (1.3-7.7)  k/uL


 


APTT    32.4 H  (22.0-30.0)  sec


 


Sodium   134 L   (137-145)  mmol/L


 


BUN   41 H   (7-17)  mg/dL


 


Creatinine   1.20 H   (0.52-1.04)  mg/dL


 


Glucose   149 H   (74-99)  mg/dL


 


Calcium     (8.4-10.2)  mg/dL


 


Total Bilirubin   1.4 H   (0.2-1.3)  mg/dL


 


Total Protein   6.0 L   (6.3-8.2)  g/dL


 


Albumin   3.3 L   (3.5-5.0)  g/dL


 


Crossmatch     














  04/04/22 04/04/22 04/04/22 Range/Units





  11:36 20:40 21:10 


 


WBC    12.1 H  (3.8-10.6)  k/uL


 


RBC    2.37 L  (3.80-5.40)  m/uL


 


Hgb    6.9 L* D  (11.4-16.0)  gm/dL


 


Hct    21.6 L  (34.0-46.0)  %


 


Neutrophils #    9.6 H  (1.3-7.7)  k/uL


 


APTT  36.9 H  63.7 H   (22.0-30.0)  sec


 


Sodium     (137-145)  mmol/L


 


BUN     (7-17)  mg/dL


 


Creatinine     (0.52-1.04)  mg/dL


 


Glucose     (74-99)  mg/dL


 


Calcium     (8.4-10.2)  mg/dL


 


Total Bilirubin     (0.2-1.3)  mg/dL


 


Total Protein     (6.3-8.2)  g/dL


 


Albumin     (3.5-5.0)  g/dL


 


Crossmatch     














  04/04/22 04/04/22 Range/Units





  21:10 23:10 


 


WBC    (3.8-10.6)  k/uL


 


RBC    (3.80-5.40)  m/uL


 


Hgb    (11.4-16.0)  gm/dL


 


Hct    (34.0-46.0)  %


 


Neutrophils #    (1.3-7.7)  k/uL


 


APTT    (22.0-30.0)  sec


 


Sodium  132 L   (137-145)  mmol/L


 


BUN  38 H   (7-17)  mg/dL


 


Creatinine    (0.52-1.04)  mg/dL


 


Glucose  128 H   (74-99)  mg/dL


 


Calcium  8.2 L   (8.4-10.2)  mg/dL


 


Total Bilirubin    (0.2-1.3)  mg/dL


 


Total Protein    (6.3-8.2)  g/dL


 


Albumin  3.0 L   (3.5-5.0)  g/dL


 


Crossmatch   See Detail  








                                 Diabetes panel











  04/04/22 04/04/22 Range/Units





  08:57 21:10 


 


Sodium  134 L  132 L  (137-145)  mmol/L


 


Potassium  4.5  4.0  (3.5-5.1)  mmol/L


 


Chloride  99  99  ()  mmol/L


 


Carbon Dioxide  26  30  (22-30)  mmol/L


 


BUN  41 H  38 H  (7-17)  mg/dL


 


Creatinine  1.20 H  1.01  (0.52-1.04)  mg/dL


 


Glucose  149 H  128 H  (74-99)  mg/dL


 


Calcium  8.7  8.2 L  (8.4-10.2)  mg/dL


 


AST  21   (14-36)  U/L


 


ALT  16   (4-34)  U/L


 


Alkaline Phosphatase  57   ()  U/L


 


Total Protein  6.0 L   (6.3-8.2)  g/dL


 


Albumin  3.3 L  3.0 L  (3.5-5.0)  g/dL








                                  Calcium panel











  04/04/22 04/04/22 Range/Units





  08:57 21:10 


 


Calcium  8.7  8.2 L  (8.4-10.2)  mg/dL


 


Phosphorus   3.6  (2.5-4.5)  mg/dL


 


Albumin  3.3 L  3.0 L  (3.5-5.0)  g/dL








                                 Pituitary panel











  04/04/22 04/04/22 Range/Units





  08:57 21:10 


 


Sodium  134 L  132 L  (137-145)  mmol/L


 


Potassium  4.5  4.0  (3.5-5.1)  mmol/L


 


Chloride  99  99  ()  mmol/L


 


Carbon Dioxide  26  30  (22-30)  mmol/L


 


BUN  41 H  38 H  (7-17)  mg/dL


 


Creatinine  1.20 H  1.01  (0.52-1.04)  mg/dL


 


Glucose  149 H  128 H  (74-99)  mg/dL


 


Calcium  8.7  8.2 L  (8.4-10.2)  mg/dL








                                  Adrenal panel











  04/04/22 04/04/22 Range/Units





  08:57 21:10 


 


Sodium  134 L  132 L  (137-145)  mmol/L


 


Potassium  4.5  4.0  (3.5-5.1)  mmol/L


 


Chloride  99  99  ()  mmol/L


 


Carbon Dioxide  26  30  (22-30)  mmol/L


 


BUN  41 H  38 H  (7-17)  mg/dL


 


Creatinine  1.20 H  1.01  (0.52-1.04)  mg/dL


 


Glucose  149 H  128 H  (74-99)  mg/dL


 


Calcium  8.7  8.2 L  (8.4-10.2)  mg/dL


 


Total Bilirubin  1.4 H   (0.2-1.3)  mg/dL


 


AST  21   (14-36)  U/L


 


ALT  16   (4-34)  U/L


 


Alkaline Phosphatase  57   ()  U/L


 


Total Protein  6.0 L   (6.3-8.2)  g/dL


 


Albumin  3.3 L  3.0 L  (3.5-5.0)  g/dL














- Imaging


Comments: 





chest abdomen pelvis CTA  stating there was a moderate-sized right pelvic 

retroperitoneal hematoma extending into the lower abdomen presumed from 

attempted right groin catheter insertion. 





Chest CTA reports no CTA evidence for thoracic aortic aneurysm or dissection.  

Cardiomegaly with small to moderate size right pleural effusion redemonstrated. 

 Bilateral alveolar and interstitial edema demonstrated.  Cannot exclude 

bilateral multifocal areas of worsening acute infiltrates.  Correlate to exclude

 COVID-19 infection.  Evidence of underlying pulmonary artery hypertension.  

Reactive thoracic adenopathy redemonstrated.





Assessment and Plan


Assessment: 





1.  Retroperitoneal hematoma status post cardiac catheterization


2.  Acute hypoxic respiratory failure, currently on 2 L of oxygen by nasal 

cannula, essentially secondary to valvular heart disease/CAD and secondary 

decompensated heart failure.


3.  Acute non-ST segment elevation myocardial infarction.  The patient is post 

cardiac catheterization and the patient is a 99% RCA stenosis and the patient is

 status post unsuccessful PCI to RCA


4.  Severe aortic valve stenosis 


5.  Mitral regurgitation


6.  Dilated cardiomyopathy with impaired left ventricular ejection fraction 

systolic heart failure with an ejection fraction of 25-30%


7.  Bilateral pleural effusion secondary to above


8.  Hypertension


9.  Hyperlipidemia


10.  COVID 19 pneumonia back in September 2021


11.  Right groin hematoma status post cardiac catheterization


12.  Acute blood loss anemia post cardiac catheterization, right groin hematoma,

 retroperitoneal hematoma


13.  Acute kidney injury 





Plan: 





1.  Continue symptomatic and supportive care


2.  CT angiogram chest abdomen and pelvis reviewed, no evidence of bleeding 

hematoma


3.  Patient may have heart healthy diet


4.  There is no indication for any vascular surgical intervention


5.  May resume heparin if needed


6.  Monitor for signs of bleeding closely


7.  Daily CBC


Thank you for this consultation, we will continue to follow.








The impression and plan of care has been dictated as directed.





Dr. Lagunas


I performed a history and examination of this patient,  discussed the same with 

the dictator.  I agree with the dictator's note ,documented as a scribe.  Any 

additional findings or plans will be noted.

## 2022-04-05 NOTE — P.PN
Subjective


Progress Note Date: 04/05/22





This is a 71-year-old female who was admitted to [facility] with 

symptoms of CHF and evidence of severe aortic stenosis and also moderate mitral 

regurgitation.  Patient also has moderate lung disease.  Patient was evaluated 

by cardiac catheterization and was found to have 99% stenosis of the ostium of 

the right coronary artery and moderate disease in the LAD.  Patient also has 

severe aortic stenosis with a peak gradient of 40 across the valve.  Given her 

LV dysfunction, that was felt to be very significant.  Echocardiogram done in 

this institution again showed severe aortic stenosis and evidence of severe 

cardiomyopathy with ejection fraction of 20-30%.  Attempted stent placement of 

the RCA, resulting in dissection.  The procedure was abandoned surgical consult 

was obtained.  It.  Patient was felt to be high risk candidate and a duration 

was made to attempt percutaneous intervention of the RCA and FFR of the LAD, 

which is being scheduled for tomorrow.  Patient apparently developed a hematoma 

in the right groin after 3 days of heparin.  Heparin was discontinued at was 

restarted again see is relatively stable.  Has chronic complaints of being 

fatigued and tired.  Lungs appeared to be clear.  Heart is regular.  Her urine 

output is fair.  We'll continue current medical therapy and await for 

intervention tomorrow.





04/05/2022: The patient's clinical status remains around the same.  Denies any 

chest pain or shortness of breath but seemed to be weak and fatigued.  She was n

oted to have further drop in hemoglobin and she was taken off the heparin.  A 

computed tomography scan of the abdomen and pelvis showed evidence of 

retroperitoneal hematoma and also of subcutaneous hematoma around groin site.  

The puncture radiologically looks appropriate and is the reason for this 

hematoma is not clear.  A vascular consult is pending.  She was scheduled to 

have intervention today but probably be postponed.  Cardiac surgery is also 

following.  Heart is regular.  She is maintaining sinus rhythm.  Lungs are 

clear.  Continue current medical therapy





Objective





- Vital Signs


Vital signs: 


                                   Vital Signs











Temp  98.0 F   04/05/22 08:00


 


Pulse  64   04/05/22 10:00


 


Resp  44 H  04/05/22 10:00


 


BP  98/56   04/05/22 10:00


 


Pulse Ox  97   04/05/22 10:00








                                 Intake & Output











 04/04/22 04/05/22 04/05/22





 18:59 06:59 18:59


 


Intake Total 1557.097 981.178 60


 


Output Total 400 575 0


 


Balance 1157.097 406.178 60


 


Weight  113.3 kg 


 


Intake:   


 


  IV 1165 305 60


 


    Sodium Chloride 0.9% 1, 40  





    000 ml @ 20 mls/hr IV .   





    Q24H [facility] Rx#:[id number]   


 


    Sodium Chloride 0.9% 500 1125 305 60





    ml 500 ml @ 125 mls/hr IV   





    .Q4H [facility] Rx#:[id number]   


 


  Intake, IV Titration 92.097 100.178 





  Amount   


 


    Heparin Sod,Pork in 0.45% 92.097 100.178 





    NaCl 25,000 unit In 0.45   





    % NaCl 1 250ml.bag @ 18   





    UNITS/KG/HR 19.224 mls/hr   





    IV .Q13H1M [facility] Rx#:   





    [id number]   


 


  Oral 300  


 


  Blood Product  576 


 


    Rc Pheresis As-3  Unit  288 





    [id number]   


 


Output:   


 


  Urine 400 575 0


 


Other:   


 


  Voiding Method Incontinent Incontinent Incontinent





 External Catheter External Catheter External Catheter








                       ABP, PAP, CO, CI - Last Documented











Arterial Blood Pressure        107/66

















- Exam





GENERAL EXAM: Patient is alert and oriented and doesn't appear to be in any 

acute distress


HEENT: Normocephalic. Normal reaction of pupils, equal size, normal range of 

extraocular motion. No erythema or exudates in the throat.


NECK: No masses, no nuchal rigidity.


CHEST: No chest wall deformity.


LUNGS: Diminished breath sounds at bases


HEART: S1 and S2 normal with no audible mumurs or gallops. Regular rhythm, 

femorals equal on both sides..


ABDOMEN: No hepatosplenomegaly, normal bowel sounds, no guarding or rigidity.


SKIN: No rashes


CENTRAL NERVOUS SYSTEM: No focal deficits.


EXTREMITIES: No cyanosis, clubbing or edema.





- Labs


CBC & Chem 7: 


                                 04/05/22 08:17





                                 04/05/22 07:33


Labs: 


                  Abnormal Lab Results - Last 24 Hours (Table)











  04/04/22 04/04/22 04/04/22 Range/Units





  11:36 20:40 21:10 


 


WBC    12.1 H  (3.8-10.6)  k/uL


 


RBC    2.37 L  (3.80-5.40)  m/uL


 


Hgb    6.9 L* D  (11.4-16.0)  gm/dL


 


Hct    21.6 L  (34.0-46.0)  %


 


Neutrophils #    9.6 H  (1.3-7.7)  k/uL


 


Lymphocytes #     (1.0-4.8)  k/uL


 


APTT  36.9 H  63.7 H   (22.0-30.0)  sec


 


Sodium     (137-145)  mmol/L


 


BUN     (7-17)  mg/dL


 


Glucose     (74-99)  mg/dL


 


Calcium     (8.4-10.2)  mg/dL


 


Total Protein     (6.3-8.2)  g/dL


 


Albumin     (3.5-5.0)  g/dL


 


Crossmatch     














  04/04/22 04/04/22 04/05/22 Range/Units





  21:10 23:10 07:33 


 


WBC     (3.8-10.6)  k/uL


 


RBC     (3.80-5.40)  m/uL


 


Hgb     (11.4-16.0)  gm/dL


 


Hct     (34.0-46.0)  %


 


Neutrophils #     (1.3-7.7)  k/uL


 


Lymphocytes #     (1.0-4.8)  k/uL


 


APTT     (22.0-30.0)  sec


 


Sodium  132 L   134 L  (137-145)  mmol/L


 


BUN  38 H   36 H  (7-17)  mg/dL


 


Glucose  128 H   101 H  (74-99)  mg/dL


 


Calcium  8.2 L   8.1 L  (8.4-10.2)  mg/dL


 


Total Protein    5.6 L  (6.3-8.2)  g/dL


 


Albumin  3.0 L   2.8 L  (3.5-5.0)  g/dL


 


Crossmatch   See Detail   














  04/05/22 Range/Units





  08:17 


 


WBC   (3.8-10.6)  k/uL


 


RBC  2.55 L  (3.80-5.40)  m/uL


 


Hgb  7.6 L  (11.4-16.0)  gm/dL


 


Hct  23.6 L  (34.0-46.0)  %


 


Neutrophils #  7.9 H  (1.3-7.7)  k/uL


 


Lymphocytes #  0.9 L  (1.0-4.8)  k/uL


 


APTT   (22.0-30.0)  sec


 


Sodium   (137-145)  mmol/L


 


BUN   (7-17)  mg/dL


 


Glucose   (74-99)  mg/dL


 


Calcium   (8.4-10.2)  mg/dL


 


Total Protein   (6.3-8.2)  g/dL


 


Albumin   (3.5-5.0)  g/dL


 


Crossmatch   














Assessment and Plan


(1) CAD (coronary artery disease)


Current Visit: Yes   Status: Acute   Code(s): I25.10 - ATHSCL HEART DISEASE OF 

NATIVE CORONARY ARTERY W/O ANG PCTRS   SNOMED Code(s): 88956878


   





(2) Severe aortic stenosis


Current Visit: Yes   Status: Acute   Code(s): I35.0 - NONRHEUMATIC AORTIC 

(VALVE) STENOSIS   SNOMED Code(s): 390561717


   





(3) Nonischemic cardiomyopathy


Current Visit: Yes   Status: Acute   Code(s): I42.8 - OTHER CARDIOMYOPATHIES   

SNOMED Code(s): 73587009


   





(4) Restrictive lung disease


Current Visit: Yes   Status: Acute   Code(s): J98.4 - OTHER DISORDERS OF LUNG   

SNOMED Code(s): 88248830


   


Plan: 


Patient developed anemia and there is evidence of retroperitoneal hematoma.  

Vascular consult is pending.  Patient's procedure for PCI's canceled for today. 

Otherwise, physical status seemed to be stable.  Further recommendations depend 

upon clinical course

## 2022-04-05 NOTE — P.PN
Subjective


Progress Note Date: 04/05/22


Principal diagnosis: 





Coronary artery disease with 99% RCA stenosis, non-STEMI this admission, status 

post unsuccessful PCI to the right coronary artery, severe aortic valve stenosi

s, trace mitral valve regurgitation with mild mitral valve stenosis, dilated 

cardiomyopathy, acute on chronic systolic heart failure.  Past medical history 

significant for hypertension, hyperlipidemia, chronic hypoxic respiratory 

failure on 2 L nasal cannula home oxygen, COVID-19 pneumonia in September 2021, 

remains unvaccinated for COVID-19, morbid obesity, depression, remote history of

nicotine dependence, severe restrictive lung disease. 





The patient was seen in follow-up today 04/05/2022 at her bedside in the 

intensive care unit.  The patient is laying in bed, is awake, alert, oriented 3

and is in no acute distress.  She continues to have episodes of tearing up with 

conversation.  Oxygen saturations are 98% on 2 L nasal cannula and she is 

achieving 1500 mL with encouragement on her incentive spirometry.  Her 

hemoglobin was 6.9 yesterday and received one unit of packed red blood cells and

today her hemoglobin is 7.6.  A computed tomography scan TAVR protocol was 

completed yesterday 04/04/2022 which incidentally showed a moderate sized right 

pelvic retroperitoneal hematoma extending into the lower abdomen.  Vascular 

surgery has been consulted and heparin drip is currently on hold.  The patient 

denies any complaints of pain, shortness of breath, nausea or vomiting at this 

time.  Bedside telemetry showing normal sinus rhythm heart rate 70 bpm.  She 

remains afebrile the last 24 hours.  Her blood pressure is currently 108/59 with

a map 75.  Right groin hematoma site is soft to touch and nontender.  Yesterday 

04/04/2022 her BUN and creatinine were 41 and 1.20 the patient did receive some 

hydration prior and a post her CT TAVR, and her Lasix is currently on hold.  BUN

and creatinine today are 36 and 0.63.





Objective





- Vital Signs


Vital signs: 


                                   Vital Signs











Temp  98.0 F   04/05/22 08:00


 


Pulse  71   04/05/22 08:00


 


Resp  19   04/05/22 08:00


 


BP  91/64   04/05/22 08:00


 


Pulse Ox  98   04/05/22 08:00








                                 Intake & Output











 04/04/22 04/05/22 04/05/22





 18:59 06:59 18:59


 


Intake Total 1557.097 981.178 20


 


Output Total 400 575 0


 


Balance 1157.097 406.178 20


 


Weight  113.3 kg 


 


Intake:   


 


  IV 1165 305 20


 


    Sodium Chloride 0.9% 1, 40  





    000 ml @ 20 mls/hr IV .   





    Q24H [facility] Rx#:[id number]   


 


    Sodium Chloride 0.9% 500 1125 305 20





    ml 500 ml @ 125 mls/hr IV   





    .Q4H [facility] Rx#:[id number]   


 


  Intake, IV Titration 92.097 100.178 





  Amount   


 


    Heparin Sod,Pork in 0.45% 92.097 100.178 





    NaCl 25,000 unit In 0.45   





    % NaCl 1 250ml.bag @ 18   





    UNITS/KG/HR 19.224 mls/hr   





    IV .Q13H1M [facility] Rx#:   





    [id number]   


 


  Oral 300  


 


  Blood Product  576 


 


    Rc Pheresis As-3  Unit  288 





    [id number]   


 


Output:   


 


  Urine 400 575 0


 


Other:   


 


  Voiding Method Incontinent Incontinent Incontinent





 External Catheter External Catheter External Catheter








                       ABP, PAP, CO, CI - Last Documented











Arterial Blood Pressure        107/66

















- Exam





CONSTITUTIONAL:  Awake, alert and oriented 3 with episodes of tearing up, 

cooperative and is in no apparent acute distress.


RESPIRATORY:  Lungs sounds essentially clear throughout, diminished bilateral 

bases, right greater than left.  Respirations are symmetrical and nonlabored.  C

urrently on 2 L nasal cannula with oxygen saturation 98%.  Able to achieve 1500 

mL on incentive spirometry.  Strong cough.  


CARDIOVASCULAR:  S1, S2 present, pansystolic murmur present 3/6.  Regular rate 

and rhythm, sinus rhythm on telemetry.  Doppler lower extremity pulses 

bilaterally.  No calf pain or tenderness noted. 


GASTROINTESTINAL:  Abdomen soft, nontender, nondistended.  Active bowel sounds 

present 4 quadrants.  Tolerating diet.


INTEGUMENTARY:  Skin is warm and dry with evidence of good perfusion.  

Ecchymosis to her right groin, soft and nontender to palpate. 


NEUROLOGIC:  Cranial nerves II through XII intact.  No focal deficits.


MUSKULOSKELETAL:  Able to move all extremities, strength equal bilaterally.


PSYCHIATRIC:  Alert, oriented to person place and time, flat affect.








- Allied health notes


Allied health notes reviewed: nursing





- Labs


CBC & Chem 7: 


                                 04/05/22 08:17





                                 04/05/22 07:33


Labs: 


                  Abnormal Lab Results - Last 24 Hours (Table)











  04/04/22 04/04/22 04/04/22 Range/Units





  08:57 08:57 11:36 


 


WBC     (3.8-10.6)  k/uL


 


RBC     (3.80-5.40)  m/uL


 


Hgb     (11.4-16.0)  gm/dL


 


Hct     (34.0-46.0)  %


 


Neutrophils #     (1.3-7.7)  k/uL


 


Lymphocytes #     (1.0-4.8)  k/uL


 


APTT   32.4 H  36.9 H  (22.0-30.0)  sec


 


Sodium  134 L    (137-145)  mmol/L


 


BUN  41 H    (7-17)  mg/dL


 


Creatinine  1.20 H    (0.52-1.04)  mg/dL


 


Glucose  149 H    (74-99)  mg/dL


 


Calcium     (8.4-10.2)  mg/dL


 


Total Bilirubin  1.4 H    (0.2-1.3)  mg/dL


 


Total Protein  6.0 L    (6.3-8.2)  g/dL


 


Albumin  3.3 L    (3.5-5.0)  g/dL


 


Crossmatch     














  04/04/22 04/04/22 04/04/22 Range/Units





  20:40 21:10 21:10 


 


WBC   12.1 H   (3.8-10.6)  k/uL


 


RBC   2.37 L   (3.80-5.40)  m/uL


 


Hgb   6.9 L* D   (11.4-16.0)  gm/dL


 


Hct   21.6 L   (34.0-46.0)  %


 


Neutrophils #   9.6 H   (1.3-7.7)  k/uL


 


Lymphocytes #     (1.0-4.8)  k/uL


 


APTT  63.7 H    (22.0-30.0)  sec


 


Sodium    132 L  (137-145)  mmol/L


 


BUN    38 H  (7-17)  mg/dL


 


Creatinine     (0.52-1.04)  mg/dL


 


Glucose    128 H  (74-99)  mg/dL


 


Calcium    8.2 L  (8.4-10.2)  mg/dL


 


Total Bilirubin     (0.2-1.3)  mg/dL


 


Total Protein     (6.3-8.2)  g/dL


 


Albumin    3.0 L  (3.5-5.0)  g/dL


 


Crossmatch     














  04/04/22 04/05/22 04/05/22 Range/Units





  23:10 07:33 08:17 


 


WBC     (3.8-10.6)  k/uL


 


RBC    2.55 L  (3.80-5.40)  m/uL


 


Hgb    7.6 L  (11.4-16.0)  gm/dL


 


Hct    23.6 L  (34.0-46.0)  %


 


Neutrophils #    7.9 H  (1.3-7.7)  k/uL


 


Lymphocytes #    0.9 L  (1.0-4.8)  k/uL


 


APTT     (22.0-30.0)  sec


 


Sodium   134 L   (137-145)  mmol/L


 


BUN   36 H   (7-17)  mg/dL


 


Creatinine     (0.52-1.04)  mg/dL


 


Glucose   101 H   (74-99)  mg/dL


 


Calcium   8.1 L   (8.4-10.2)  mg/dL


 


Total Bilirubin     (0.2-1.3)  mg/dL


 


Total Protein   5.6 L   (6.3-8.2)  g/dL


 


Albumin   2.8 L   (3.5-5.0)  g/dL


 


Crossmatch  See Detail    














- Imaging and Cardiology





CT TAVR results reviewed.





Assessment and Plan


Assessment: 





1.  Coronary artery disease with 99% RCA stenosis, non-STEMI this admission, 

status post unsuccessful PCI to the right coronary artery


2.  Severe aortic valve stenosis, peak/mean gradient 78.27/47.94 mmHg


3.  Trace mitral regurgitation with mild mitral stenosis on transthoracic 

echocardiogram, peak/mean gradients of 14.48/6.4 mmHg, 


4.  Dilated cardiomyopathy


5.  Acute on chronic systolic heart failure, EF 25-30% on transthoracic 

echocardiogram


6.  Right pleural effusion, likely secondary to above


7.  History of hypertension


8.  Hyperlipidemia, cholesterol 333,  


9.  Chronic hypoxic respiratory failure on 2 L nasal cannula home oxygen


10.  COVID-19 pneumonia in September 2021


11.  Remains unvaccinated for COVID-19


12.  Morbid obesity


13.  Depression, currently on Zoloft


14.  Remote history of nicotine dependence


15.  Severe restrictive lung disease, FEV1 45% of predicted 


16.  Generalized medical debility, patient uses walker for ambulation


17.  Acute kidney injury


18.  Right groin hematoma at the site of her cardiac catheterization, soft to 

palpate


Plan: 





1.  Continue low dose aspirin, statin and beta blocker. Heparin drip has been 

discontinued.


2.  Diuresis, heart failure management per cardiology recommendations.


3.  PCI recommendations per cardiology.  We will workup for TAVR.  The patient 

is a high risk surgical candidate for surgical aortic valve replacement and 

CABG.


4.  Medical management of other comorbidities per primary care service.


5.  More recommendations to follow based on patient's clinical course.





Time with Patient: Greater than 30

## 2022-04-05 NOTE — P.PN
Subjective


Progress Note Date: 04/05/22





HISTORY OF PRESENT ILLNESS


This is a 71-year-old female with past medical history of dilated cardiomyopathy

with ejection fraction of 15-20%, severe aortic valve stenosis, mild aortic 

regurgitation with moderate to severe mitral regurgitation, chronic systolic 

heart failure, hyperlipidemia, obesity with possible obstructive sleep apnea and

obesity hypoventilation syndrome, chronic hypoxic respiratory failure on home O2

at 2 L nasal cannula, hypertension hypertensive cardio vascular disease, 

previous Covid 19 infection.  Patient presented to [facility] 

slight elevation of troponin, EKG showed changes suggestive ischemic changes and

was started on aspirin, heparin drip and admitted to the hospital.  Patient was 

seen by cardiology. She was supposed to have a heart catheterization done as an 

outpatient along with [facility] for possible aortic valve replacement and mitral valve

and possible CABG.  Patient underwent coronary angiography finding right 

dominant vessel, 99% ostial stenosis, aortic pressure is 100/70, 40 mm gradient 

across the aortic valve.  Left ventricular end diastolic pressure is 2530.  

Patient was transferred to [facility] for PTCA and stent 

placement of the RCA which was unsuccessful.  Consult with cardiothoracic 

surgery for CABG and valve repair/replacement.





4/1: Patient is seen today in the intensive care unit.  She denies chest pain or

shortness of breath. She complains of anxiety and decreased appetite. Less cough

today.She has been seen by intensivist and cardiothoracic surgery, currently on 

heparin drip, consult was added for dental evaluation and clearance and 

panoramic CT.  Patient has been afebrile, heart rate in the 80s, blood pressure 

101/73, pulse ox 96%.  Repeat blood work reveals CBC is unremarkable.  Potassium

4.0.  Urinalysis negative for infection.  Hepatitis panel negative.  MRSA nasal 

screen is in process.  CT of the chest revealed cardiomegaly with suspected 

pulmonary edema and moderate to marked right sided pleural effusion.  Associated

infection can't be excluded.


Echocardiogram reveals EF of 25-30% with mild concentric left ventricular 

hypertrophy, severe global hypokinesia of the LV, severe aortic stenosis with 

gradient 78.27 mm a new 3/47.94 mmHg, trace mitral regurgitation, mild mitral 

stenosis, mild tricuspid regurgitation, mild pulmonary hypertension, RVSP 43.33 

mmHg.


Carotid ultrasound revealed less than 50% stenosis bilateral carotid systems.





4/2: Patient is sitting up in bed she underwent CTA of the chest for evaluation 

of dissection of the right coronary artery, she denies any chest pain at this 

time, she has no shortness breath, she has no abdominal pain, nausea or 

vomiting, she continues to have some coughing noted from production, she 

continues to be somewhat depressed and anxious and she is not sure what to do, 

she is missing her daughter she stated and she doesn't think her daughter is 

able to come and see her.





4/3: Patient is sitting on the bed in no apparent distress, she continues to be 

quite depressed and anxious about her situation, she has not made up her mind, I

had a long conversation with Dr. Jacobs regarding the plan to pursue the [facility] care at this time, she was deemed high surgical risk per cardiovascular 

surgery service at the current [place], and she would benefit from a 

transcatheter aortic valve replacement plus reattempted PCI of the RCA, her CT 

angiography of the chest did not show evidence of any dissection of the thoracic

aorta , patient is maintained on Zoloft 100 mg every day, she is mentioned in 

ICU, and the plan to the PCI hopefully in the next 24 hours patient and her 

daughter Janneth are in agreement for conservative approach and they understand the

risks of the procedure and that she will have the TAVR at a later date or during

this hospital admission depends on her symptoms, we will consult Psychiatry for 

depression and possible bipolar disorder.





4/4: Patient remains in intensive care unit, afebrile, heart rate in the 80s, 

blood pressure 101/58, pulse ox 97% on room air.  Patient denies having any 

chest pain but continues to have anxiety issues.  Consult with psychiatry is 

pending.  Patient has been scheduled for CT TAVR protocol for today.  Repeat 

blood work reveals WBC 15, hemoglobin 8.6, platelet count 172.  Sodium 134, BUN 

41 creatinine 1.2.  Blood sugar 149.  Total bilirubin 1.4 otherwise liver 

function tests are normal.  Nasal MRSA screen is negative.





4/5: Patient remains in the intensive care unit.  Patient underwent CAT scan 

yesterday for TAVR protoxol and was found to have moderate size right pelvic 

retroperitoneal hematoma extending into the lower abdomen presumed from att

empted right groin catheter insertion.  Consult was added for vascular surgery 

with plan to continue supportive care, no plan for surgical intervention.  

Heparin was discontinued.  Patient was transfused 1 unit of packed RBCs for 

hemoglobin of 6.9 with repeat hemoglobin of 7.6.


Patient has been afebrile, heart rate 70, blood pressure 109/62, pulse ox 97%.  

Cardiac monitor is sinus rhythm.  Patient has also been seen and followed by 

psychiatry for depression and acute bereavement with the loss of her , 

recommendations Zoloft 125 mg daily at bedtime.











REVIEW OF SYSTEMS


Constitutional: No fever, no chills, no night sweats.  No weight change.  

Positive for generalized weakness, positive for fatigue or lethargy.  No daytime

sleepiness.


HEENT: No headache.  No blurred vision or double vision, no loss of vision.  No 

loss of Hearing, no ringing in the ears, no dizziness.  No nasal drainage or 

congestion.  No epistaxis.  No sore throat.


Lungs: Positive for shortness of breath, positive for cough, no sputum 

production.  No wheezing.  Reports dyspnea on exertion.


Cardiovascular: No chest pain, no lower extremity edema.  No palpitations.  No 

paroxysmal nocturnal dyspnea.  No orthopnea.  No lightheadedness or dizziness.  

No syncopal episodes.


Abdominal: Reports right lower quadrant abdominal pain.  No nausea, vomiting.  

No diarrhea.  No constipation.  No bloody or tarry stools.  Reports loss of 

appetite.


Genitourinary: No dysuria, increased frequency, urgency.  No urinary retention.


Musculoskeletal: No myalgias.  Generalized muscle weakness, no gait dysfunction,

no frequent falls.  Positive for back pain.  No neck pain.


Integumentary: No wounds, no lesions.  No rash or pruritus.  Positive for 

bruising.  No change in hair or nails.


Neurologic: No aphasia. No facial droop. No change in mentation. No head injury.

No headache. No paralysis. No paresthesia.


Psychiatric: Positive for depression.  Reports anxiety.  Positive for mood 

swings


Endocrine: No abnormal blood sugars.  Positive for weight change.  No excessive 

sweating or thirst.  No cold intolerance.  





PHYSICAL EXAMINATION


Gen: This is a 71-year-old  female.


HEENT: Head is atraumatic, normocephalic. Pupils equal, round. Sclerae is 

anicteric. 


NECK: Supple. No JVD. No lymphadenopathy. No thyromegaly. 


LUNGS: Decreased breath sounds at the bases, decreased tactile fremitus at the 

right lower third, few rhonchi, no expiratory wheeze, no chest wall tenderness. 

No intercostal retractions.


HEART: First heart sound is depressed, second heart sound is normal, systolic 

ejection murmur 3/6 at the right upper sternal border radiating to the base of 

the neck, systolic ejection murmur 2/6 at the apex rating to the axilla.


ABDOMEN: Soft, mild tenderness right lower quadrant, nondistended, positive 

bowel sounds, multiple ecchymosis.


EXTREMITIES: 1+ pedal edema.  No calf tenderness.  Her cells pedis +1 

bilaterally.  Bilateral hammertoes.


NEUROLOGICAL: Patient is awake, alert and oriented x3. Cranial nerves 2 through 

12 are grossly intact.,  Cranial nerves II-12 appear grossly intact, muscle 

power 4 out of 5 in upper and lower extremities bilaterally.





ASSESSMENT AND PLAN





1.   Non ST elevation  myocardial infarction with history of prior dilated 

cardiomyopathy status post coronary angiography finding right dominant vessel, 

99% ostial stenosis, aortic pressure is 100/70, 40 mm gradient across the aortic

valve.  Left ventricular end diastolic pressure is 2530.  Patient was 

transferred to [facility] for PTCA and stent placement of the 

RCA which was unsuccessful.  Consult with cardiothoracic surgery appreciated.  

Intensivist consultation appreciated.  Continue aspirin 81 mg daily, on Coreg 

6.25 mg twice daily.  Patient is unable to tolerate statins because of 

significant myopathy and plan is to start her on Repatha under 40 mg subcu every

2 weeks.  





2.  Dilated cardiomyopathy.  continue patient on carvedilol 3.125 mg orally 

twice every day, continue with monitoring input and output and daily weight.  





3.  Severe aortic valve stenosis with mild aortic regurgitation.  Discussed with

the patient the pros and cons on transcatheter aortic valve replacement versus 

CABG to the RCA with surgical aortic valve replacement.





4.  Moderate to Severe mitral regurgitation. likely will continue with co

nservative management no plan for mitral valve repair. 





5.  Hyperlipidemia.  Patient unable to tolerate statins.  She was started on 

atorvastatin 40 mg once every day.





6.  Obesity with obstructive sleep apnea and obesity hypoventilation syndrome.  

Patient has not had sleep study done yet. 





7.  Chronic hypoxic respiratory failure secondary to his Covid 19 and underlying

COPD and possible obstructive sleep apnea and hypoventilation syndrome.  Patient

is normally on 2 L nasal cannula.





8.  Acute on chronic systolic heart failure.  Continue Lasix 40 mg IV every 12 

hours, Coreg, Aldactone 25 mg daily.  





9.  Hypertension, hypertensive cardiovascular disease. continue carvedilol 3.125

mg orally twice every day patient is not on ACE-I or ARB due to severe 

hypertension,  





10.  Recurrent depression and generalized anxiety disorder.  Continue Zoloft 50 

mg orally once every day, consult Psychiatry for further evaluation


 


11.  DVT prophylaxis.    





12.  GI  prophylaxis.  Protonix 40 mg IV push daily.





13.  Overall prognosis is very guarded.  





14.  Acute retroperitoneal hematoma with acute blood loss anemia status post 

transfusion 1 unit of packed RBCs.  Consult with vascular surgery appreciated.  

Heparin has been discontinued.  Continue to monitor hemoglobin closely.











Impression and plan of care have been directed as dictated by the signing 

physician.  Charla Guevara nurse practitioner acting as scribe for signing 

physician.





Objective





- Vital Signs


Vital signs: 


                                   Vital Signs











Temp  98.0 F   04/05/22 08:00


 


Pulse  64   04/05/22 10:00


 


Resp  44 H  04/05/22 10:00


 


BP  98/56   04/05/22 10:00


 


Pulse Ox  97   04/05/22 10:00








                                 Intake & Output











 04/04/22 04/05/22 04/05/22





 18:59 06:59 18:59


 


Intake Total 1557.097 981.178 60


 


Output Total 400 575 0


 


Balance 1157.097 406.178 60


 


Weight  113.3 kg 


 


Intake:   


 


  IV 1165 305 60


 


    Sodium Chloride 0.9% 1, 40  





    000 ml @ 20 mls/hr IV .   





    Q24H [facility] Rx#:[id number]   


 


    Sodium Chloride 0.9% 500 1125 305 60





    ml 500 ml @ 125 mls/hr IV   





    .Q4H [facility] Rx#:[id number]   


 


  Intake, IV Titration 92.097 100.178 





  Amount   


 


    Heparin Sod,Pork in 0.45% 92.097 100.178 





    NaCl 25,000 unit In 0.45   





    % NaCl 1 250ml.bag @ 18   





    UNITS/KG/HR 19.224 mls/hr   





    IV .Q13H1M [facility] Rx#:   





    [id number]   


 


  Oral 300  


 


  Blood Product  576 


 


    Rc Pheresis As-3  Unit  288 





    [id number]   


 


Output:   


 


  Urine 400 575 0


 


Other:   


 


  Voiding Method Incontinent Incontinent Incontinent





 External Catheter External Catheter External Catheter








                       ABP, PAP, CO, CI - Last Documented











Arterial Blood Pressure        107/66

















- Labs


CBC & Chem 7: 


                                 04/05/22 08:17





                                 04/05/22 07:33


Labs: 


                  Abnormal Lab Results - Last 24 Hours (Table)











  04/04/22 04/04/22 04/04/22 Range/Units





  20:40 21:10 21:10 


 


WBC   12.1 H   (3.8-10.6)  k/uL


 


RBC   2.37 L   (3.80-5.40)  m/uL


 


Hgb   6.9 L* D   (11.4-16.0)  gm/dL


 


Hct   21.6 L   (34.0-46.0)  %


 


Neutrophils #   9.6 H   (1.3-7.7)  k/uL


 


Lymphocytes #     (1.0-4.8)  k/uL


 


APTT  63.7 H    (22.0-30.0)  sec


 


Sodium    132 L  (137-145)  mmol/L


 


BUN    38 H  (7-17)  mg/dL


 


Glucose    128 H  (74-99)  mg/dL


 


Calcium    8.2 L  (8.4-10.2)  mg/dL


 


Total Protein     (6.3-8.2)  g/dL


 


Albumin    3.0 L  (3.5-5.0)  g/dL


 


Crossmatch     














  04/04/22 04/05/22 04/05/22 Range/Units





  23:10 07:33 08:17 


 


WBC     (3.8-10.6)  k/uL


 


RBC    2.55 L  (3.80-5.40)  m/uL


 


Hgb    7.6 L  (11.4-16.0)  gm/dL


 


Hct    23.6 L  (34.0-46.0)  %


 


Neutrophils #    7.9 H  (1.3-7.7)  k/uL


 


Lymphocytes #    0.9 L  (1.0-4.8)  k/uL


 


APTT     (22.0-30.0)  sec


 


Sodium   134 L   (137-145)  mmol/L


 


BUN   36 H   (7-17)  mg/dL


 


Glucose   101 H   (74-99)  mg/dL


 


Calcium   8.1 L   (8.4-10.2)  mg/dL


 


Total Protein   5.6 L   (6.3-8.2)  g/dL


 


Albumin   2.8 L   (3.5-5.0)  g/dL


 


Crossmatch  See Detail

## 2022-04-05 NOTE — P.PN
Subjective


Progress Note Date: 04/05/22











This is a pleasant 71-year-old female patient with biventricular failure, severe

aortic stenosis, mitral regurgitation and coronary artery disease.  The patient 

underwent a cardiac catheterization and the patient was found to have 99% RCA 

stenosis and the patient is post non-ST segment elevation myocardial infarction.

 The patient had an unsuccessful PCI of the RCA.  Initially, she was being 

contemplated for cardiac surgery knowing that she has valvular heart disease and

she was also being looks for a single-vessel bypass surgery.  Ultimately, it was

decided to do another cardiac catheterization tomorrow.  She is known to have 

dilated cardiomyopathy and impaired left ventricular ejection fraction at 

patient presented with acute CHF.  She is currently on O2 at 2 L per minute 

nasal cannula.  She is comfortable.  Nevertheless, she is anxious and she denies

having any chest pain.  Hemodynamically stable on no pressors.  She continues to

be guarded with Lasix and she is on 8040 mg IV every 12 hours.  She is off IV 

heparin as the patient developed an acute hematoma the right femoral artery/tamia

in area at the site of the cardiac catheterization.  The hematoma today is quite

soft and the patient has a hemoglobin of 8.6 which is essentially compatible to 

yesterday of 9.1.  There has been some mild drop in hemoglobin 9 of the 

hemoglobin on 04/03/2022 was 10.0.  Platelet counts are normal.  The hematoma in

the right groin is soft.  The plan is to proceed with another cardiac 

catheterization hopefully within the next 24 hours.  Of notice, isn't elevation 

in the creatinine which is up to 1.2 on today's evaluation.  The neck fluid 

balance over the past 24 hours has been in the order of +98 mL on this patient. 

Based on that, the Lasix has been placed on hold.  Meanwhile, the patient veena

nues to be on IV heparin infusion.  Note that the IV heparin was discontinued.  

Now that there is no active bleeding from the hematoma, cardiology gave orders 

to restart the IV heparin.





04/05/2020, the patient is being seen for a follow-up.  Overnight, the patient 

developed further drop in hemoglobin down to 6.9 as the patient was having some 

retroperitoneal bleeding.  IV heparin was discontinued.  The patient got 

transfused with a 1 units of packed RBC.  CAT scan of the chest abdomen and 

pelvis was done and it showed evidence of retroperitoneal bleed on the right 

displacing bladder.  There was also a small to moderate-sized right-sided 

pleural effusion and some pulmonary vascular congestion.  Based on that, IV 

heparin was discontinued and the patient was given a total of 1 units of packed 

RBC and hemoglobin today is at 7.6.  History of any chest pain.  She continues 

to have some shortness of breath at rest. Fluid balance has been +1.2 L over the

past 24 hours.  She is currently on 2 L about 2 by nasal cannula.  The plan is 

to undergo a cardiac catheterization and stenting of the RCA and cardiology is 

on the case.  Despite the drop in hemoglobin, and ongoing esophageal bleed, the 

patient's groin is soft and there is a soft area of ecchymosis along the right 

groin area.  Pulses in lower extremity is diminished at the present.





Objective





- Vital Signs


Vital signs: 


                                   Vital Signs











Temp  98.0 F   04/05/22 08:00


 


Pulse  64   04/05/22 10:00


 


Resp  44 H  04/05/22 10:00


 


BP  98/56   04/05/22 10:00


 


Pulse Ox  97   04/05/22 10:00








                                 Intake & Output











 04/04/22 04/05/22 04/05/22





 18:59 06:59 18:59


 


Intake Total 1557.097 981.178 60


 


Output Total 400 575 0


 


Balance 1157.097 406.178 60


 


Weight  113.3 kg 


 


Intake:   


 


  IV 1165 305 60


 


    Sodium Chloride 0.9% 1, 40  





    000 ml @ 20 mls/hr IV .   





    Q24H [facility] Rx#:[id number]   


 


    Sodium Chloride 0.9% 500 1125 305 60





    ml 500 ml @ 125 mls/hr IV   





    .Q4H [facility] Rx#:[id number]   


 


  Intake, IV Titration 92.097 100.178 





  Amount   


 


    Heparin Sod,Pork in 0.45% 92.097 100.178 





    NaCl 25,000 unit In 0.45   





    % NaCl 1 250ml.bag @ 18   





    UNITS/KG/HR 19.224 mls/hr   





    IV .Q13H1M [facility] Rx#:   





    [id number]   


 


  Oral 300  


 


  Blood Product  576 


 


    Rc Pheresis As-3  Unit  288 





    [id number]   


 


Output:   


 


  Urine 400 575 0


 


Other:   


 


  Voiding Method Incontinent Incontinent Incontinent





 External Catheter External Catheter External Catheter








                       ABP, PAP, CO, CI - Last Documented











Arterial Blood Pressure        107/66

















- Exam








CONSTITUTIONAL:  Tearful, cooperative and is in no acute distress.  The patient 

remains anxious.


Head exam was generally normal. There was no scleral icterus or corneal arcus. 

Mucous membranes were moist.


Neck was supple and without jugular venous distension, thyromegaly, or carotid 

bruits. Carotids were easily palpable bilaterally. There was no adenopathy.


RESPIRATORY:  Lungs sounds essentially clear throughout, diminished bilateral 

bases, right greater than left.  Respirations are symmetrical and nonlabored.  

Currently on 2 L nasal cannula with oxygen saturation 96%.  Able to achieve 1500

mL on incentive spirometry.  Strong cough.  


CARDIOVASCULAR:  S1, S2 present, pansystolic murmur present 3/6.  Regular rate 

and rhythm, sinus rhythm on telemetry.  Doppler lower extremity pulses 

bilaterally.  No calf pain or tenderness noted. 


GASTROINTESTINAL:  Abdomen soft, nontender, nondistended.  Active bowel sounds 

present 4 quadrants.  Tolerating diet.  A soft hematoma is present over the 

right inguinal area which has not enlarged in size.


INTEGUMENTARY:  Skin is warm and dry with evidence of good perfusion.  


NEUROLOGIC:  Cranial nerves II through XII intact


MUSKULOSKELETAL:  Able to move all extremities, strength equal bilaterally.


PSYCHIATRIC:  Alert, oriented to person place and time, flat affect.











- Labs


CBC & Chem 7: 


                                 04/05/22 08:17





                                 04/05/22 07:33


Labs: 


                  Abnormal Lab Results - Last 24 Hours (Table)











  04/04/22 04/04/22 04/04/22 Range/Units





  20:40 21:10 21:10 


 


WBC   12.1 H   (3.8-10.6)  k/uL


 


RBC   2.37 L   (3.80-5.40)  m/uL


 


Hgb   6.9 L* D   (11.4-16.0)  gm/dL


 


Hct   21.6 L   (34.0-46.0)  %


 


Neutrophils #   9.6 H   (1.3-7.7)  k/uL


 


Lymphocytes #     (1.0-4.8)  k/uL


 


APTT  63.7 H    (22.0-30.0)  sec


 


Sodium    132 L  (137-145)  mmol/L


 


BUN    38 H  (7-17)  mg/dL


 


Glucose    128 H  (74-99)  mg/dL


 


Calcium    8.2 L  (8.4-10.2)  mg/dL


 


Total Protein     (6.3-8.2)  g/dL


 


Albumin    3.0 L  (3.5-5.0)  g/dL


 


Crossmatch     














  04/04/22 04/05/22 04/05/22 Range/Units





  23:10 07:33 08:17 


 


WBC     (3.8-10.6)  k/uL


 


RBC    2.55 L  (3.80-5.40)  m/uL


 


Hgb    7.6 L  (11.4-16.0)  gm/dL


 


Hct    23.6 L  (34.0-46.0)  %


 


Neutrophils #    7.9 H  (1.3-7.7)  k/uL


 


Lymphocytes #    0.9 L  (1.0-4.8)  k/uL


 


APTT     (22.0-30.0)  sec


 


Sodium   134 L   (137-145)  mmol/L


 


BUN   36 H   (7-17)  mg/dL


 


Glucose   101 H   (74-99)  mg/dL


 


Calcium   8.1 L   (8.4-10.2)  mg/dL


 


Total Protein   5.6 L   (6.3-8.2)  g/dL


 


Albumin   2.8 L   (3.5-5.0)  g/dL


 


Crossmatch  See Detail    














Assessment and Plan


Plan: 











1 acute hypoxic respiratory failure, currently on 2 L of oxygen by nasal 

cannula, essentially secondary to valvular heart disease/CAD and secondary 

decompensated heart failure.





2 acute non-ST segment elevation myocardial infarction.  The patient is post 

cardiac catheterization and the patient is a 99% RCA stenosis and the patient is

status post unsuccessful PCI to RCA.  The patient is being considered for ano

ther cardiac catheterization by cardiology and stenting of the RCA.





3 severe aortic valve stenosis with a peak and mean gradient of 78 and 47 mmHg, 

and the patient continues to have a harsh cardiac murmur systolic ejection on 

examination.





4 mitral regurgitation





5 dilated cardiomyopathy with impaired left ventricular ejection fraction 

systolic heart failure with an ejection fraction of 25-30%





6 bilateral pleural effusion secondary to above





7 hypertension





8 hyperlipidemia





9 COVID 19 pneumonia back in September 2021





10 retroperitoneal bleed with a development of a soft right groin hematoma at a 

set of cardiac catheterization,  





11 acute blood loss anemia with a hemoglobin of  6.9 and the patient got 

transfused with a total of 2 units of packed RBC and IV heparin was 

discontinued.





12 acute kidney injury improving and the creatinine is normalized





13 medical debility seconds above-mentioned comorbidities





Plan





Hold anticoagulants


Assessment with cardiology on the ongoing retroperitoneal bleed and may need a 

vascular involvement 


 the hematoma in the right groin area is rather soft 


 hemoglobin has responded nicely to transfusion and her hemoglobin is up to 7.6 

posttransfusion with a total of 1 units of packed RBCs


Keep the patient off IV heparin for now


Continue aspirin and statins and beta blockers


Cardiothoracic surgeries on the case regarding possibility of bypass/aortic 

valve replacement versus PCI and possibly TaVR procedure regarding the aortic 

valve.  Accordingly, a CT of the chest was completed and the patient has 

cardiomegaly and pulmonary edema and moderate sized right-sided pleural 

effusion.


Long-term prognosis poor baseline above-mentioned comorbidities.  We'll continue

to follow make further recommendations from a pulmonary standpoint based on her 

progression.

## 2022-04-06 LAB
ANION GAP SERPL CALC-SCNC: 5 MMOL/L
BASOPHILS # BLD AUTO: 0.1 K/UL (ref 0–0.2)
BASOPHILS NFR BLD AUTO: 1 %
BUN SERPL-SCNC: 24 MG/DL (ref 7–17)
CALCIUM SPEC-MCNC: 8.3 MG/DL (ref 8.4–10.2)
CHLORIDE SERPL-SCNC: 102 MMOL/L (ref 98–107)
CO2 SERPL-SCNC: 30 MMOL/L (ref 22–30)
EOSINOPHIL # BLD AUTO: 0.2 K/UL (ref 0–0.7)
EOSINOPHIL NFR BLD AUTO: 2 %
ERYTHROCYTE [DISTWIDTH] IN BLOOD BY AUTOMATED COUNT: 2.56 M/UL (ref 3.8–5.4)
ERYTHROCYTE [DISTWIDTH] IN BLOOD: 15.2 % (ref 11.5–15.5)
GLUCOSE SERPL-MCNC: 133 MG/DL (ref 74–99)
HCT VFR BLD AUTO: 24.1 % (ref 34–46)
HGB BLD-MCNC: 7.6 GM/DL (ref 11.4–16)
LYMPHOCYTES # SPEC AUTO: 0.9 K/UL (ref 1–4.8)
LYMPHOCYTES NFR SPEC AUTO: 9 %
MCH RBC QN AUTO: 29.8 PG (ref 25–35)
MCHC RBC AUTO-ENTMCNC: 31.7 G/DL (ref 31–37)
MCV RBC AUTO: 93.9 FL (ref 80–100)
MONOCYTES # BLD AUTO: 0.7 K/UL (ref 0–1)
MONOCYTES NFR BLD AUTO: 7 %
NEUTROPHILS # BLD AUTO: 8.4 K/UL (ref 1.3–7.7)
NEUTROPHILS NFR BLD AUTO: 81 %
PLATELET # BLD AUTO: 150 K/UL (ref 150–450)
POTASSIUM SERPL-SCNC: 4.1 MMOL/L (ref 3.5–5.1)
SODIUM SERPL-SCNC: 137 MMOL/L (ref 137–145)
WBC # BLD AUTO: 10.5 K/UL (ref 3.8–10.6)

## 2022-04-06 RX ADMIN — IPRATROPIUM BROMIDE AND ALBUTEROL SULFATE SCH: .5; 3 SOLUTION RESPIRATORY (INHALATION) at 19:56

## 2022-04-06 RX ADMIN — PANTOPRAZOLE SODIUM SCH MG: 40 INJECTION, POWDER, FOR SOLUTION INTRAVENOUS at 08:17

## 2022-04-06 RX ADMIN — CEFAZOLIN SCH MLS/HR: 330 INJECTION, POWDER, FOR SOLUTION INTRAMUSCULAR; INTRAVENOUS at 17:05

## 2022-04-06 RX ADMIN — SERTRALINE HYDROCHLORIDE SCH MG: 25 TABLET ORAL at 21:01

## 2022-04-06 RX ADMIN — IPRATROPIUM BROMIDE AND ALBUTEROL SULFATE SCH: .5; 3 SOLUTION RESPIRATORY (INHALATION) at 15:08

## 2022-04-06 RX ADMIN — LORATADINE SCH MG: 10 TABLET ORAL at 08:17

## 2022-04-06 RX ADMIN — DIGOXIN SCH MCG: 125 TABLET ORAL at 08:21

## 2022-04-06 RX ADMIN — OXYCODONE HYDROCHLORIDE AND ACETAMINOPHEN SCH MG: 500 TABLET ORAL at 08:17

## 2022-04-06 RX ADMIN — BUDESONIDE AND FORMOTEROL FUMARATE DIHYDRATE SCH: 160; 4.5 AEROSOL RESPIRATORY (INHALATION) at 08:02

## 2022-04-06 RX ADMIN — BUDESONIDE AND FORMOTEROL FUMARATE DIHYDRATE SCH: 160; 4.5 AEROSOL RESPIRATORY (INHALATION) at 19:56

## 2022-04-06 RX ADMIN — IPRATROPIUM BROMIDE AND ALBUTEROL SULFATE SCH: .5; 3 SOLUTION RESPIRATORY (INHALATION) at 08:02

## 2022-04-06 RX ADMIN — SERTRALINE HYDROCHLORIDE SCH MG: 100 TABLET ORAL at 21:01

## 2022-04-06 RX ADMIN — IPRATROPIUM BROMIDE AND ALBUTEROL SULFATE SCH: .5; 3 SOLUTION RESPIRATORY (INHALATION) at 12:00

## 2022-04-06 RX ADMIN — ENOXAPARIN SODIUM SCH MG: 40 INJECTION SUBCUTANEOUS at 08:17

## 2022-04-06 RX ADMIN — Medication SCH MG: at 17:12

## 2022-04-06 RX ADMIN — ACETAMINOPHEN PRN MG: 325 TABLET, FILM COATED ORAL at 22:43

## 2022-04-06 RX ADMIN — ATORVASTATIN CALCIUM SCH MG: 40 TABLET, FILM COATED ORAL at 21:01

## 2022-04-06 RX ADMIN — OXYCODONE HYDROCHLORIDE AND ACETAMINOPHEN SCH MG: 500 TABLET ORAL at 17:12

## 2022-04-06 RX ADMIN — SPIRONOLACTONE SCH MG: 25 TABLET, FILM COATED ORAL at 08:17

## 2022-04-06 RX ADMIN — Medication SCH MG: at 08:17

## 2022-04-06 RX ADMIN — Medication SCH: at 21:02

## 2022-04-06 RX ADMIN — ASPIRIN 81 MG CHEWABLE TABLET SCH MG: 81 TABLET CHEWABLE at 08:17

## 2022-04-06 NOTE — P.PN
Subjective


Progress Note Date: 04/06/22











This is a pleasant 71-year-old female patient with biventricular failure, severe

aortic stenosis, mitral regurgitation and coronary artery disease.  The patient 

underwent a cardiac catheterization and the patient was found to have 99% RCA 

stenosis and the patient is post non-ST segment elevation myocardial infarction.

 The patient had an unsuccessful PCI of the RCA.  Initially, she was being 

contemplated for cardiac surgery knowing that she has valvular heart disease and

she was also being looks for a single-vessel bypass surgery.  Ultimately, it was

decided to do another cardiac catheterization tomorrow.  She is known to have 

dilated cardiomyopathy and impaired left ventricular ejection fraction at 

patient presented with acute CHF.  She is currently on O2 at 2 L per minute 

nasal cannula.  She is comfortable.  Nevertheless, she is anxious and she denies

having any chest pain.  Hemodynamically stable on no pressors.  She continues to

be guarded with Lasix and she is on 8040 mg IV every 12 hours.  She is off IV 

heparin as the patient developed an acute hematoma the right femoral artery/tamia

in area at the site of the cardiac catheterization.  The hematoma today is quite

soft and the patient has a hemoglobin of 8.6 which is essentially compatible to 

yesterday of 9.1.  There has been some mild drop in hemoglobin 9 of the 

hemoglobin on 04/03/2022 was 10.0.  Platelet counts are normal.  The hematoma in

the right groin is soft.  The plan is to proceed with another cardiac 

catheterization hopefully within the next 24 hours.  Of notice, isn't elevation 

in the creatinine which is up to 1.2 on today's evaluation.  The neck fluid 

balance over the past 24 hours has been in the order of +98 mL on this patient. 

Based on that, the Lasix has been placed on hold.  Meanwhile, the patient veena

nues to be on IV heparin infusion.  Note that the IV heparin was discontinued.  

Now that there is no active bleeding from the hematoma, cardiology gave orders 

to restart the IV heparin.





04/05/2020, the patient is being seen for a follow-up.  Overnight, the patient 

developed further drop in hemoglobin down to 6.9 as the patient was having some 

retroperitoneal bleeding.  IV heparin was discontinued.  The patient got 

transfused with a 1 units of packed RBC.  CAT scan of the chest abdomen and 

pelvis was done and it showed evidence of retroperitoneal bleed on the right 

displacing bladder.  There was also a small to moderate-sized right-sided 

pleural effusion and some pulmonary vascular congestion.  Based on that, IV 

heparin was discontinued and the patient was given a total of 1 units of packed 

RBC and hemoglobin today is at 7.6.  History of any chest pain.  She continues 

to have some shortness of breath at rest. Fluid balance has been +1.2 L over the

past 24 hours.  She is currently on 2 L about 2 by nasal cannula.  The plan is 

to undergo a cardiac catheterization and stenting of the RCA and cardiology is 

on the case.  Despite the drop in hemoglobin, and ongoing esophageal bleed, the 

patient's groin is soft and there is a soft area of ecchymosis along the right 

groin area.  Pulses in lower extremity is diminished at the present.





04/06/2022, condition is stable and unchanged compared to yesterday.  No signs 

of any ongoing bleeding and the patient's hemoglobin today is at 7.6 which is 

comparable to yesterday.  No change in the hematoma along the right groin area. 

No anticoagulants for now the patient is taken on Lovenox for prophylaxis.  The 

patient has no new complaints.  Resting comfortably in bed.  No coronary 

intervention was done and the cardiac interventional cardiology, decided to wait

a few more days until this is a pleasant hematoma is more settled prior doing a

ny intervention.  Meanwhile, the patient remains on 2 L of O2 by nasal cannula. 

Input output balance has been +230 mL over the past 24 hours.  BUN is a 36 with 

a creatinine of 0.6 and the potassium level of 4.6 and his sodium level of 134. 

White cell count is at 9.8 and the plated count is at 152.  No altered 

mentation.  The patient remains on aspirin.  The patient on Lovenox for DVT 

prophylaxis 40 mg subcu.  The patient remains on Coreg 3.125 mg by mouth twice a

day and digoxin and Aldactone.





Objective





- Vital Signs


Vital signs: 


                                   Vital Signs











Temp  97.7 F   04/06/22 08:00


 


Pulse  81   04/06/22 10:00


 


Resp  20   04/06/22 10:00


 


BP  103/65   04/06/22 10:00


 


Pulse Ox  97   04/06/22 10:00








                                 Intake & Output











 04/05/22 04/06/22 04/06/22





 18:59 06:59 18:59


 


Intake Total 920 540 60


 


Output Total 750 750 200


 


Balance 170 -210 -140


 


Weight  111 kg 


 


Intake:   


 


   260 60


 


    Sodium Chloride 0.9% 1, 140 260 60





    000 ml @ 20 mls/hr IV .   





    Q24H [facility] Rx#:[id number]   


 


    Sodium Chloride 0.9% 500 60  





    ml 500 ml @ 125 mls/hr IV   





    .Q4H [facility] Rx#:[id number]   


 


  Oral 720 280 


 


Output:   


 


  Urine 750 750 200


 


Other:   


 


  Voiding Method Incontinent Incontinent 





 External Catheter External Catheter 


 


  # Voids  1 








                       ABP, PAP, CO, CI - Last Documented











Arterial Blood Pressure        107/66

















- Exam








CONSTITUTIONAL:  Tearful, cooperative and is in no acute distress.  The patient 

remains anxious.


Head exam was generally normal. There was no scleral icterus or corneal arcus. M

ucous membranes were moist.


Neck was supple and without jugular venous distension, thyromegaly, or carotid 

bruits. Carotids were easily palpable bilaterally. There was no adenopathy.


RESPIRATORY:  Lungs sounds essentially clear throughout, diminished bilateral 

bases, right greater than left.  Respirations are symmetrical and nonlabored.  

Currently on 2 L nasal cannula with oxygen saturation 96%.  Able to achieve 1500

mL on incentive spirometry.  Strong cough.  


CARDIOVASCULAR:  S1, S2 present, pansystolic murmur present 3/6.  Regular rate 

and rhythm, sinus rhythm on telemetry.  Doppler lower extremity pulses 

bilaterally.  No calf pain or tenderness noted. 


GASTROINTESTINAL:  Abdomen soft, nontender, nondistended.  Active bowel sounds 

present 4 quadrants.  Tolerating diet.  A soft hematoma is present over the 

right inguinal area which has not enlarged in size.


INTEGUMENTARY:  Skin is warm and dry with evidence of good perfusion.  


NEUROLOGIC:  Cranial nerves II through XII intact


MUSKULOSKELETAL:  Able to move all extremities, strength equal bilaterally.


PSYCHIATRIC:  Alert, oriented to person place and time, flat affect.











- Labs


CBC & Chem 7: 


                                 04/06/22 08:37





                                 04/06/22 07:25


Labs: 


                  Abnormal Lab Results - Last 24 Hours (Table)











  04/06/22 04/06/22 Range/Units





  07:25 08:37 


 


RBC   2.56 L  (3.80-5.40)  m/uL


 


Hgb   7.6 L  (11.4-16.0)  gm/dL


 


Hct   24.1 L  (34.0-46.0)  %


 


Neutrophils #   8.4 H  (1.3-7.7)  k/uL


 


Lymphocytes #   0.9 L  (1.0-4.8)  k/uL


 


BUN  24 H   (7-17)  mg/dL


 


Creatinine  0.49 L   (0.52-1.04)  mg/dL


 


Glucose  133 H   (74-99)  mg/dL


 


Calcium  8.3 L   (8.4-10.2)  mg/dL














Assessment and Plan


Plan: 











1 acute hypoxic respiratory failure, currently on 2 L of oxygen by nasal 

cannula, essentially secondary to valvular heart disease/CAD and secondary 

decompensated heart failure.  Clinically stable





2 acute non-ST segment elevation myocardial infarction.  The patient is post 

cardiac catheterization and the patient is a 99% RCA stenosis and the patient is

status post unsuccessful PCI to RCA.  The patient is being considered for 

another cardiac catheterization by cardiology and stenting of the RCA.  For now 

the patient is feeling of any chest pain and the patient is currently off IV 

heparin





3 severe aortic valve stenosis with a peak and mean gradient of 78 and 47 mmHg, 

and the patient continues to have a harsh cardiac murmur systolic ejection on 

examination.





4 mitral regurgitation





5 dilated cardiomyopathy with impaired left ventricular ejection fraction 

systolic heart failure with an ejection fraction of 25-30%





6 bilateral pleural effusion secondary to above





7 hypertension





8 hyperlipidemia





9 COVID 19 pneumonia back in September 2021





10 retroperitoneal bleed with a development of a soft right groin hematoma at a 

set of cardiac catheterization,  the hemoglobin remains stable for now and there

is no interval worsening in the right groin hematoma





11 acute blood loss anemia with a hemoglobin of  6.9 and the patient got 

transfused with a total of 1 units of packed RBC and IV heparin was 

discontinued.  The patient's hemoglobin is stable at 7.6 on today's evaluation





12 acute kidney injury improving and the creatinine is normalized





13 medical debility seconds above-mentioned comorbidities





Plan





Continue monitoring this patient in the intensive care unit


 the hematoma in the right groin area is rather soft and there is no signs of 

ongoing bleeding at this point in time.  Hemoglobin remains stable.


Keep the patient off IV heparin for now


Continue aspirin and statins and beta blockers


Cardiothoracic surgeries on the case regarding possibility of bypass/aortic 

valve replacement versus PCI and possibly TaVR procedure regarding the aortic 

valve.  Accordingly, a CT of the chest was completed and the patient has 

cardiomegaly and pulmonary edema and moderate sized right-sided pleural 

effusion.


Long-term prognosis poor baseline above-mentioned comorbidities.  


We'll continue to follow

## 2022-04-06 NOTE — P.PN
Subjective


Progress Note Date: 04/06/22


Principal diagnosis: 





Coronary artery disease with 99% RCA stenosis, non-STEMI this admission, status 

post unsuccessful PCI to the right coronary artery, severe aortic valve stenosi

s, trace mitral valve regurgitation with mild mitral valve stenosis, dilated 

cardiomyopathy, acute on chronic systolic heart failure.  Past medical history 

significant for hypertension, hyperlipidemia, chronic hypoxic respiratory 

failure on 2 L nasal cannula home oxygen, COVID-19 pneumonia in September 2021, 

remains unvaccinated for COVID-19, morbid obesity, depression, remote history of

nicotine dependence, severe restrictive lung disease. 





The patient was seen in follow-up today 04/06/2022 at her bedside in intensive 

care unit.  Currently she is lying in bed, is awake, alert, and oriented 3 and 

is in no acute distress.  The patient continues to have episodes of anxiousness 

and frequent episodes of tearing.  She is complaining of some postnasal drip, 

but denies any complaints of chest pain or shortness of breath with lying in 

bed.  She was started on Lovenox 40 mg subcu daily yesterday by cardiology.  

Oxygen saturations are 97% on 2 L nasal cannula and she is achieving 1500 mL on 

her incentive spirometry with encouragement.  Laboratory results this morning 

show a WBC count of 10.5, hemoglobin 7.6, platelets 150, sodium 137, potassium 

4.1, BUN 24 and creatinine 0.49.  According to the patient, there is possible 

plans of transfer to [facility] for further cardiac intervention.  The 

patient reports that she is quite anxious about this.





Objective





- Vital Signs


Vital signs: 


                                   Vital Signs











Temp  97.7 F   04/06/22 08:00


 


Pulse  81   04/06/22 10:00


 


Resp  20   04/06/22 10:00


 


BP  103/65   04/06/22 10:00


 


Pulse Ox  97   04/06/22 10:00








                                 Intake & Output











 04/05/22 04/06/22 04/06/22





 18:59 06:59 18:59


 


Intake Total 920 540 60


 


Output Total 750 750 200


 


Balance 170 -210 -140


 


Weight  111 kg 


 


Intake:   


 


   260 60


 


    Sodium Chloride 0.9% 1, 140 260 60





    000 ml @ 20 mls/hr IV .   





    Q24H [facility] Rx#:[id number]   


 


    Sodium Chloride 0.9% 500 60  





    ml 500 ml @ 125 mls/hr IV   





    .Q4H [facility] Rx#:[id number]   


 


  Oral 720 280 


 


Output:   


 


  Urine 750 750 200


 


Other:   


 


  Voiding Method Incontinent Incontinent 





 External Catheter External Catheter 


 


  # Voids  1 








                       ABP, PAP, CO, CI - Last Documented











Arterial Blood Pressure        107/66

















- Exam





CONSTITUTIONAL:  Awake, alert and oriented 3 with episodes anxious this and 

tearing up, cooperative and is in no apparent acute distress.


RESPIRATORY:  Lungs sounds essentially clear throughout, diminished bilateral 

bases, right greater than left.  Respirations are symmetrical and nonlabored.  

Currently on 2 L nasal cannula with oxygen saturation 97%.  Able to achieve 1500

mL on incentive spirometry.  Strong cough.  


CARDIOVASCULAR:  S1, S2 present, pansystolic murmur present 3/6.  Regular rate 

and rhythm, sinus rhythm on telemetry.  Doppler lower extremity pulses 

bilaterally.  No calf pain or tenderness noted. 


GASTROINTESTINAL:  Abdomen soft, nontender, nondistended.  Active bowel sounds 

present 4 quadrants.  Tolerating diet.


INTEGUMENTARY:  Skin is warm and dry with evidence of good perfusion.  

Ecchymosis to her right groin, soft and nontender to palpate. 


NEUROLOGIC:  Cranial nerves II through XII intact.  No focal deficits.


MUSKULOSKELETAL:  Able to move all extremities, strength equal bilaterally.


PSYCHIATRIC:  Alert, oriented to person place and time, flat affect.








- Allied health notes


Allied health notes reviewed: nursing





- Labs


CBC & Chem 7: 


                                 04/06/22 08:37





                                 04/06/22 07:25


Labs: 


                  Abnormal Lab Results - Last 24 Hours (Table)











  04/06/22 04/06/22 Range/Units





  07:25 08:37 


 


RBC   2.56 L  (3.80-5.40)  m/uL


 


Hgb   7.6 L  (11.4-16.0)  gm/dL


 


Hct   24.1 L  (34.0-46.0)  %


 


Neutrophils #   8.4 H  (1.3-7.7)  k/uL


 


Lymphocytes #   0.9 L  (1.0-4.8)  k/uL


 


BUN  24 H   (7-17)  mg/dL


 


Creatinine  0.49 L   (0.52-1.04)  mg/dL


 


Glucose  133 H   (74-99)  mg/dL


 


Calcium  8.3 L   (8.4-10.2)  mg/dL














Assessment and Plan


Assessment: 





1.  Coronary artery disease with 99% RCA stenosis, non-STEMI this admission, 

status post unsuccessful PCI to the right coronary artery


2.  Severe aortic valve stenosis, peak/mean gradient 78.27/47.94 mmHg


3.  Trace mitral regurgitation with mild mitral stenosis on transthoracic 

echocardiogram, peak/mean gradients of 14.48/6.4 mmHg, 


4.  Dilated cardiomyopathy


5.  Acute on chronic systolic heart failure, EF 25-30% on transthoracic 

echocardiogram


6.  Right pleural effusion, likely secondary to above


7.  History of hypertension


8.  Hyperlipidemia, cholesterol 333,  


9.  Chronic hypoxic respiratory failure on 2 L nasal cannula home oxygen


10.  COVID-19 pneumonia in September 2021


11.  Remains unvaccinated for COVID-19


12.  Morbid obesity


13.  Depression, currently on Zoloft


14.  Remote history of nicotine dependence


15.  Severe restrictive lung disease, FEV1 45% of predicted 


16.  Generalized medical debility, patient uses walker for ambulation


17.  Acute kidney injury


18.  Right groin hematoma at the site of her cardiac catheterization, soft to 

palpate


Plan: 





1.  Continue low dose aspirin, statin and beta blocker. 


2.  Diuresis, heart failure management per cardiology recommendations.


3.  PCI recommendations per cardiology.  We will workup for TAVR.  The patient 

is a high risk surgical candidate for surgical aortic valve replacement and 

CABG.


4.  Medical management of other comorbidities per primary care service.


5.  Lovenox 40 mg subcutaneous daily was initiated by cardiology yesterday 

04/05/2022.


6.  More recommendations to follow based on patient's clinical course.





Time with Patient: Greater than 30

## 2022-04-06 NOTE — P.PN
Subjective


Progress Note Date: 04/06/22





This is a 71-year-old female who was admitted to [facility] with 

symptoms of CHF and evidence of severe aortic stenosis and also moderate mitral 

regurgitation.  Patient also has moderate lung disease.  Patient was evaluated 

by cardiac catheterization and was found to have 99% stenosis of the ostium of 

the right coronary artery and moderate disease in the LAD.  Patient also has 

severe aortic stenosis with a peak gradient of 40 across the valve.  Given her 

LV dysfunction, that was felt to be very significant.  Echocardiogram done in 

this institution again showed severe aortic stenosis and evidence of severe 

cardiomyopathy with ejection fraction of 20-30%.  Attempted stent placement of 

the RCA, resulting in dissection.  The procedure was abandoned surgical consult 

was obtained.  It.  Patient was felt to be high risk candidate and a duration 

was made to attempt percutaneous intervention of the RCA and FFR of the LAD, 

which is being scheduled for tomorrow.  Patient apparently developed a hematoma 

in the right groin after 3 days of heparin.  Heparin was discontinued at was 

restarted again see is relatively stable.  Has chronic complaints of being 

fatigued and tired.  Lungs appeared to be clear.  Heart is regular.  Her urine 

output is fair.  We'll continue current medical therapy and await for 

intervention tomorrow.





04/05/2022: The patient's clinical status remains around the same.  Denies any 

chest pain or shortness of breath but seemed to be weak and fatigued.  She was n

oted to have further drop in hemoglobin and she was taken off the heparin.  A 

computed tomography scan of the abdomen and pelvis showed evidence of 

retroperitoneal hematoma and also of subcutaneous hematoma around groin site.  

The puncture radiologically looks appropriate and is the reason for this 

hematoma is not clear.  A vascular consult is pending.  She was scheduled to 

have intervention today but probably be postponed.  Cardiac surgery is also 

following.  Heart is regular.  She is maintaining sinus rhythm.  Lungs are 

clear.  Continue current medical therapy.





04/06/2022: Patient's remains relatively stable.  Her hemoglobin is stable 

without evidence of acute or active bleeding.  Groin is soft, denies any chest 

pain and doesn't appear to be in acute distress.  Her creatinine is normal.  

Discussed with patient and also daughter regarding further intervention.  Dr. Jacobs's willing to try again to do percutaneous intervention.  After that 

patient could have transcatheter aortic valve replacement.  Patient and daughter

are fully aware of the risks associated with the procedure and wanted to have 

this is done locally.  They were given the option of going to a tertiary center 

like [facility].  However, patient is reluctant and wants to have it 

done here as soon as possible.  Lungs are clear.  Heart is regular.  Height, 

came to Dr. Jacobs to proceed with intervention probably tomorrow





Objective





- Vital Signs


Vital signs: 


                                   Vital Signs











Temp  97.7 F   04/06/22 08:00


 


Pulse  82   04/06/22 11:00


 


Resp  31 H  04/06/22 11:00


 


BP  106/65   04/06/22 11:00


 


Pulse Ox  98   04/06/22 11:00








                                 Intake & Output











 04/05/22 04/06/22 04/06/22





 18:59 06:59 18:59


 


Intake Total 920 540 60


 


Output Total 750 750 200


 


Balance 170 -210 -140


 


Weight  111 kg 


 


Intake:   


 


   260 60


 


    Sodium Chloride 0.9% 1, 140 260 60





    000 ml @ 20 mls/hr IV .   





    Q24H [facility] Rx#:[id number]   


 


    Sodium Chloride 0.9% 500 60  





    ml 500 ml @ 125 mls/hr IV   





    .Q4H [facility] Rx#:[id number]   


 


  Oral 720 280 


 


Output:   


 


  Urine 750 750 200


 


Other:   


 


  Voiding Method Incontinent Incontinent 





 External Catheter External Catheter 


 


  # Voids  1 








                       ABP, PAP, CO, CI - Last Documented











Arterial Blood Pressure        107/66

















- Exam





GENERAL EXAM: Patient is alert and oriented and doesn't appear to be in any 

acute distress


HEENT: Normocephalic. Normal reaction of pupils, equal size, normal range of 

extraocular motion. No erythema or exudates in the throat.


NECK: No masses, no nuchal rigidity.


CHEST: No chest wall deformity.


LUNGS: Diminished breath sounds at bases


HEART: S1 and S2 normal .  Systolic murmur in the aortic area


ABDOMEN: No hepatosplenomegaly, normal bowel sounds, no guarding or rigidity.


SKIN: No rashes


CENTRAL NERVOUS SYSTEM: No focal deficits.


EXTREMITIES: No cyanosis, clubbing or edema.





- Labs


CBC & Chem 7: 


                                 04/06/22 08:37





                                 04/06/22 07:25


Labs: 


                  Abnormal Lab Results - Last 24 Hours (Table)











  04/06/22 04/06/22 Range/Units





  07:25 08:37 


 


RBC   2.56 L  (3.80-5.40)  m/uL


 


Hgb   7.6 L  (11.4-16.0)  gm/dL


 


Hct   24.1 L  (34.0-46.0)  %


 


Neutrophils #   8.4 H  (1.3-7.7)  k/uL


 


Lymphocytes #   0.9 L  (1.0-4.8)  k/uL


 


BUN  24 H   (7-17)  mg/dL


 


Creatinine  0.49 L   (0.52-1.04)  mg/dL


 


Glucose  133 H   (74-99)  mg/dL


 


Calcium  8.3 L   (8.4-10.2)  mg/dL














Assessment and Plan


(1) CAD (coronary artery disease)


Current Visit: Yes   Status: Acute   Code(s): I25.10 - ATHSCL HEART DISEASE OF 

NATIVE CORONARY ARTERY W/O ANG PCTRS   SNOMED Code(s): 25326583


   





(2) Severe aortic stenosis


Current Visit: Yes   Status: Acute   Code(s): I35.0 - NONRHEUMATIC AORTIC 

(VALVE) STENOSIS   SNOMED Code(s): 020641293


   





(3) Nonischemic cardiomyopathy


Current Visit: Yes   Status: Acute   Code(s): I42.8 - OTHER CARDIOMYOPATHIES   

SNOMED Code(s): 79946200


   





(4) Restrictive lung disease


Current Visit: Yes   Status: Acute   Code(s): J98.4 - OTHER DISORDERS OF LUNG   

SNOMED Code(s): 75570284


   


Plan: 


Patient is relatively stable.  Hemoglobin appears to be stable and more than 7 

g.  After discussing with daughter and patient, I informed Dr. Jacobs proceed 

intervention, followed tomorrow

## 2022-04-06 NOTE — P.PN
Subjective


Progress Note Date: 04/06/22





Patient is seen and examined this a follow-up.  She remains in the ICU.  No 

complaints of any abdominal pain.  No worsening of bruising or hematoma in the 

right groin.  Hemoglobin stable at 7.6.





Objective





- Vital Signs


Vital signs: 


                                   Vital Signs











Temp  97.7 F   04/06/22 08:00


 


Pulse  81   04/06/22 10:00


 


Resp  20   04/06/22 10:00


 


BP  103/65   04/06/22 10:00


 


Pulse Ox  97   04/06/22 10:00








                                 Intake & Output











 04/05/22 04/06/22 04/06/22





 18:59 06:59 18:59


 


Intake Total 920 540 60


 


Output Total 750 750 200


 


Balance 170 -210 -140


 


Weight  111 kg 


 


Intake:   


 


   260 60


 


    Sodium Chloride 0.9% 1, 140 260 60





    000 ml @ 20 mls/hr IV .   





    Q24H [facility] Rx#:[id number]   


 


    Sodium Chloride 0.9% 500 60  





    ml 500 ml @ 125 mls/hr IV   





    .Q4H [facility] Rx#:[id number]   


 


  Oral 720 280 


 


Output:   


 


  Urine 750 750 200


 


Other:   


 


  Voiding Method Incontinent Incontinent 





 External Catheter External Catheter 


 


  # Voids  1 








                       ABP, PAP, CO, CI - Last Documented











Arterial Blood Pressure        107/66

















- Exam





General appearance: The patient is alert, oriented, appears in no acute 

distress.


HET: Head is normocephalic and atraumatic.  Pupils are equal and reactive.  


Neck: Supple without lymphadenopathy.  Trachea midline.  No audible carotid 

bruit.


Heart: S1 S2.  Systolic murmur noted.  Regular rate and rhythm.


Lungs: Clear to auscultation bilaterally.


Abdomen: Soft, nontender, nondistended.


Extremities: Normal skin color and turgor.  Right groin with ecchymosis, soft 

hematoma noted.  No active bleeding.  Minimal tenderness.  Bilateral lower 

extremity edema.


Neurological: No focal deficits.  Strength and sensation are grossly intact.








- Labs


CBC & Chem 7: 


                                 04/06/22 08:37





                                 04/06/22 07:25


Labs: 


                  Abnormal Lab Results - Last 24 Hours (Table)











  04/06/22 04/06/22 Range/Units





  07:25 08:37 


 


RBC   2.56 L  (3.80-5.40)  m/uL


 


Hgb   7.6 L  (11.4-16.0)  gm/dL


 


Hct   24.1 L  (34.0-46.0)  %


 


Neutrophils #   8.4 H  (1.3-7.7)  k/uL


 


Lymphocytes #   0.9 L  (1.0-4.8)  k/uL


 


BUN  24 H   (7-17)  mg/dL


 


Creatinine  0.49 L   (0.52-1.04)  mg/dL


 


Glucose  133 H   (74-99)  mg/dL


 


Calcium  8.3 L   (8.4-10.2)  mg/dL














Assessment and Plan


Assessment: 





1.  Retroperitoneal hematoma status post cardiac catheterization


2.  Acute hypoxic respiratory failure, currently on 2 L of oxygen by nasal 

cannula, essentially secondary to valvular heart disease/CAD and secondary 

decompensated heart failure.


3.  Acute non-ST segment elevation myocardial infarction.  The patient is post 

cardiac catheterization and the patient is a 99% RCA stenosis and the patient is

status post unsuccessful PCI to RCA


4.  Severe aortic valve stenosis 


5.  Mitral regurgitation


6.  Dilated cardiomyopathy with impaired left ventricular ejection fraction 

systolic heart failure with an ejection fraction of 25-30%


7.  Bilateral pleural effusion secondary to above


8.  Hypertension


9.  Hyperlipidemia


10.  COVID 19 pneumonia back in September 2021


11.  Right groin hematoma status post cardiac catheterization


12.  Acute blood loss anemia post cardiac catheterization, right groin hematoma,

retroperitoneal hematoma


13.  Acute kidney injury 





Plan: 





1.  Continue symptomatic and supportive care


2.  CT angiogram chest abdomen and pelvis reviewed, no evidence of bleeding 

hematoma


3.  Patient may have heart healthy diet


4.  There is no indication for any vascular surgical intervention


5.  May resume heparin if needed


6.  Monitor for signs of bleeding closely


7.  Daily CBC


Thank you for this consultation, we will continue to follow.








The impression and plan of care has been dictated as directed.





Dr. Martin


I performed a history and examination of this patient,  discussed the same with 

the dictator.  I agree with the dictator's note ,documented as a scribe.  Any 

additional findings or plans will be noted.

## 2022-04-06 NOTE — P.PN
Subjective


Progress Note Date: 04/06/22





HISTORY OF PRESENT ILLNESS


This is a 71-year-old female with past medical history of dilated cardiomyopathy

with ejection fraction of 15-20%, severe aortic valve stenosis, mild aortic 

regurgitation with moderate to severe mitral regurgitation, chronic systolic 

heart failure, hyperlipidemia, obesity with possible obstructive sleep apnea and

obesity hypoventilation syndrome, chronic hypoxic respiratory failure on home O2

at 2 L nasal cannula, hypertension hypertensive cardio vascular disease, 

previous Covid 19 infection.  Patient presented to [facility] 

slight elevation of troponin, EKG showed changes suggestive ischemic changes and

was started on aspirin, heparin drip and admitted to the hospital.  Patient was 

seen by cardiology. She was supposed to have a heart catheterization done as an 

outpatient along with [facility] for possible aortic valve replacement and mitral valve

and possible CABG.  Patient underwent coronary angiography finding right 

dominant vessel, 99% ostial stenosis, aortic pressure is 100/70, 40 mm gradient 

across the aortic valve.  Left ventricular end diastolic pressure is 2530.  

Patient was transferred to [facility] for PTCA and stent 

placement of the RCA which was unsuccessful.  Consult with cardiothoracic 

surgery for CABG and valve repair/replacement.





4/1: Patient is seen today in the intensive care unit.  She denies chest pain or

shortness of breath. She complains of anxiety and decreased appetite. Less cough

today.She has been seen by intensivist and cardiothoracic surgery, currently on 

heparin drip, consult was added for dental evaluation and clearance and 

panoramic CT.  Patient has been afebrile, heart rate in the 80s, blood pressure 

101/73, pulse ox 96%.  Repeat blood work reveals CBC is unremarkable.  Potassium

4.0.  Urinalysis negative for infection.  Hepatitis panel negative.  MRSA nasal 

screen is in process.  CT of the chest revealed cardiomegaly with suspected 

pulmonary edema and moderate to marked right sided pleural effusion.  Associated

infection can't be excluded.


Echocardiogram reveals EF of 25-30% with mild concentric left ventricular 

hypertrophy, severe global hypokinesia of the LV, severe aortic stenosis with 

gradient 78.27 mm a new 3/47.94 mmHg, trace mitral regurgitation, mild mitral 

stenosis, mild tricuspid regurgitation, mild pulmonary hypertension, RVSP 43.33 

mmHg.


Carotid ultrasound revealed less than 50% stenosis bilateral carotid systems.





4/2: Patient is sitting up in bed she underwent CTA of the chest for evaluation 

of dissection of the right coronary artery, she denies any chest pain at this 

time, she has no shortness breath, she has no abdominal pain, nausea or 

vomiting, she continues to have some coughing noted from production, she 

continues to be somewhat depressed and anxious and she is not sure what to do, 

she is missing her daughter she stated and she doesn't think her daughter is 

able to come and see her.





4/3: Patient is sitting on the bed in no apparent distress, she continues to be 

quite depressed and anxious about her situation, she has not made up her mind, I

had a long conversation with Dr. Jacobs regarding the plan to pursue the [facility] care at this time, she was deemed high surgical risk per cardiovascular 

surgery service at the current [place], and she would benefit from a 

transcatheter aortic valve replacement plus reattempted PCI of the RCA, her CT 

angiography of the chest did not show evidence of any dissection of the thoracic

aorta , patient is maintained on Zoloft 100 mg every day, she is mentioned in 

ICU, and the plan to the PCI hopefully in the next 24 hours patient and her 

daughter Janneth are in agreement for conservative approach and they understand the

risks of the procedure and that she will have the TAVR at a later date or during

this hospital admission depends on her symptoms, we will consult Psychiatry for 

depression and possible bipolar disorder.





4/4: Patient remains in intensive care unit, afebrile, heart rate in the 80s, 

blood pressure 101/58, pulse ox 97% on room air.  Patient denies having any 

chest pain but continues to have anxiety issues.  Consult with psychiatry is 

pending.  Patient has been scheduled for CT TAVR protocol for today.  Repeat 

blood work reveals WBC 15, hemoglobin 8.6, platelet count 172.  Sodium 134, BUN 

41 creatinine 1.2.  Blood sugar 149.  Total bilirubin 1.4 otherwise liver 

function tests are normal.  Nasal MRSA screen is negative.





4/5: Patient remains in the intensive care unit.  Patient underwent CAT scan 

yesterday for TAVR protoxol and was found to have moderate size right pelvic 

retroperitoneal hematoma extending into the lower abdomen presumed from att

empted right groin catheter insertion.  Consult was added for vascular surgery 

with plan to continue supportive care, no plan for surgical intervention.  

Heparin was discontinued.  Patient was transfused 1 unit of packed RBCs for 

hemoglobin of 6.9 with repeat hemoglobin of 7.6.


Patient has been afebrile, heart rate 70, blood pressure 109/62, pulse ox 97%.  

Cardiac monitor is sinus rhythm.  Patient has also been seen and followed by 

psychiatry for depression and acute bereavement with the loss of her , 

recommendations Zoloft 125 mg daily at bedtime.





4/6: Patient remains in the intensive care unit.  No signs of bleeding and no 

worsening hematoma with hemoglobin stable at 7.6. Vascular surgery has cleared 

patient to resume heparin if needed, continue to monitor for bleeding and daily 

CBC.  Plan is for Dr. Jacobs will proceed with intervention tomorrow.  Patient

is also followed by intensivist, cardiothoracic surgery and psychiatry.








REVIEW OF SYSTEMS


Constitutional: No fever, no chills, no night sweats.  No weight change.  

Positive for generalized weakness, positive for fatigue or lethargy.  No daytime

sleepiness.


HEENT: No headache.  No blurred vision or double vision, no loss of vision.  No 

loss of Hearing, no ringing in the ears, no dizziness.  No nasal drainage or 

congestion.  No epistaxis.  No sore throat.


Lungs: Positive for shortness of breath, positive for cough, no sputum 

production.  No wheezing.  Reports dyspnea on exertion.


Cardiovascular: No chest pain, no lower extremity edema.  No palpitations.  No 

paroxysmal nocturnal dyspnea.  No orthopnea.  No lightheadedness or dizziness.  

No syncopal episodes.


Abdominal: Reports right lower quadrant abdominal pain.  No nausea, vomiting.  

No diarrhea.  No constipation.  No bloody or tarry stools.  Reports loss of 

appetite.


Genitourinary: No dysuria, increased frequency, urgency.  No urinary retention.


Musculoskeletal: No myalgias.  Generalized muscle weakness, no gait dysfunction,

no frequent falls.  Positive for back pain.  No neck pain.


Integumentary: No wounds, no lesions.  No rash or pruritus.  Positive for 

bruising.  No change in hair or nails.


Neurologic: No aphasia. No facial droop. No change in mentation. No head injury.

No headache. No paralysis. No paresthesia.


Psychiatric: Positive for depression.  Reports anxiety.  Positive for mood 

swings


Endocrine: No abnormal blood sugars.  Positive for weight change.  No excessive 

sweating or thirst.  No cold intolerance.  





PHYSICAL EXAMINATION


Gen: This is a 71-year-old  female.


HEENT: Head is atraumatic, normocephalic. Pupils equal, round. Sclerae is 

anicteric. 


NECK: Supple. No JVD. No lymphadenopathy. No thyromegaly. 


LUNGS: Decreased breath sounds at the bases, decreased tactile fremitus at the 

right lower third, few rhonchi, no expiratory wheeze, no chest wall tenderness. 

No intercostal retractions.


HEART: First heart sound is depressed, second heart sound is normal, systolic 

ejection murmur 3/6 at the right upper sternal border radiating to the base of 

the neck, systolic ejection murmur 2/6 at the apex rating to the axilla.


ABDOMEN: Soft, mild tenderness right lower quadrant, nondistended, positive 

bowel sounds, multiple ecchymosis.


EXTREMITIES: 1+ pedal edema.  No calf tenderness.  Her cells pedis +1 

bilaterally.  Bilateral hammertoes.


NEUROLOGICAL: Patient is awake, alert and oriented x3. Cranial nerves 2 through 

12 are grossly intact.,  Cranial nerves II-12 appear grossly intact, muscle 

power 4 out of 5 in upper and lower extremities bilaterally.





ASSESSMENT AND PLAN





1.   Non ST elevation  myocardial infarction with history of prior dilated 

cardiomyopathy status post coronary angiography finding right dominant vessel, 

99% ostial stenosis, aortic pressure is 100/70, 40 mm gradient across the aortic

valve.  Left ventricular end diastolic pressure is 2530.  Patient was 

transferred to [facility] for PTCA and stent placement of the 

RCA which was unsuccessful.  Consult with cardiothoracic surgery appreciated.  

Intensivist consultation appreciated.  Continue aspirin 81 mg daily, on Coreg 

6.25 mg twice daily.  Patient is unable to tolerate statins because of 

significant myopathy and plan is to start her on Repatha under 40 mg subcu every

2 weeks.  Surgical intervention tentatively tomorrow with Dr. Jacobs





2.  Dilated cardiomyopathy.  continue patient on carvedilol 3.125 mg orally 

twice every day, digoxin 125 g daily.  continue with monitoring input and 

output and daily weight.  





3.  Severe aortic valve stenosis with mild aortic regurgitation.  Discussed with

the patient the pros and cons on transcatheter aortic valve replacement versus 

CABG to the RCA with surgical aortic valve replacement.





4.  Moderate to Severe mitral regurgitation. likely will continue with 

conservative management no plan for mitral valve repair. 





5.  Hyperlipidemia.  Patient unable to tolerate statins.  She was started on 

atorvastatin 40 mg once every day.





6.  Obesity with obstructive sleep apnea and obesity hypoventilation syndrome.  

Patient has not had sleep study done yet. 





7.  Chronic hypoxic respiratory failure secondary to his Covid 19 and underlying

COPD and possible obstructive sleep apnea and hypoventilation syndrome.  Patient

is normally on 2 L nasal cannula.





8.  Acute on chronic systolic heart failure.  Off Lasix, continue Coreg, 

Aldactone 25 mg daily.  





9.  Hypertension, hypertensive cardiovascular disease. continue carvedilol 3.125

mg orally twice every day patient is not on ACE-I or ARB due to severe 

hypertension,  





10.  Recurrent depression and generalized anxiety disorder.  Consult consult 

with psychiatry appreciated.  Patient on Zoloft 125 mg at bedtime


 


11.  DVT prophylaxis.    





12.  GI  prophylaxis.  Protonix 40 mg IV push daily.





13.  Overall prognosis is very guarded.  





14.  Acute retroperitoneal hematoma with acute blood loss anemia status post 

transfusion 1 unit of packed RBCs.  Consult with vascular surgery appreciated.  

Heparin has been discontinued.  Continue to monitor hemoglobin closely.











Impression and plan of care have been directed as dictated by the signing 

physician.  Charla Guevara nurse practitioner acting as scribe for signing 

physician.





Objective





- Vital Signs


Vital signs: 


                                   Vital Signs











Temp  97.7 F   04/06/22 08:00


 


Pulse  82   04/06/22 11:00


 


Resp  31 H  04/06/22 11:00


 


BP  106/65   04/06/22 11:00


 


Pulse Ox  98   04/06/22 11:00








                                 Intake & Output











 04/05/22 04/06/22 04/06/22





 18:59 06:59 18:59


 


Intake Total 920 540 60


 


Output Total 750 750 200


 


Balance 170 -210 -140


 


Weight  111 kg 


 


Intake:   


 


   260 60


 


    Sodium Chloride 0.9% 1, 140 260 60





    000 ml @ 20 mls/hr IV .   





    Q24H [facility] Rx#:[id number]   


 


    Sodium Chloride 0.9% 500 60  





    ml 500 ml @ 125 mls/hr IV   





    .Q4H [facility] Rx#:[id number]   


 


  Oral 720 280 


 


Output:   


 


  Urine 750 750 200


 


Other:   


 


  Voiding Method Incontinent Incontinent 





 External Catheter External Catheter 


 


  # Voids  1 








                       ABP, PAP, CO, CI - Last Documented











Arterial Blood Pressure        107/66

















- Labs


CBC & Chem 7: 


                                 04/06/22 08:37





                                 04/06/22 07:25


Labs: 


                  Abnormal Lab Results - Last 24 Hours (Table)











  04/06/22 04/06/22 Range/Units





  07:25 08:37 


 


RBC   2.56 L  (3.80-5.40)  m/uL


 


Hgb   7.6 L  (11.4-16.0)  gm/dL


 


Hct   24.1 L  (34.0-46.0)  %


 


Neutrophils #   8.4 H  (1.3-7.7)  k/uL


 


Lymphocytes #   0.9 L  (1.0-4.8)  k/uL


 


BUN  24 H   (7-17)  mg/dL


 


Creatinine  0.49 L   (0.52-1.04)  mg/dL


 


Glucose  133 H   (74-99)  mg/dL


 


Calcium  8.3 L   (8.4-10.2)  mg/dL

## 2022-04-07 LAB
ANION GAP SERPL CALC-SCNC: 6 MMOL/L
BASOPHILS # BLD AUTO: 0.1 K/UL (ref 0–0.2)
BASOPHILS NFR BLD AUTO: 0 %
BUN SERPL-SCNC: 21 MG/DL (ref 7–17)
CALCIUM SPEC-MCNC: 8.6 MG/DL (ref 8.4–10.2)
CHLORIDE SERPL-SCNC: 102 MMOL/L (ref 98–107)
CO2 SERPL-SCNC: 29 MMOL/L (ref 22–30)
EOSINOPHIL # BLD AUTO: 0.3 K/UL (ref 0–0.7)
EOSINOPHIL NFR BLD AUTO: 2 %
ERYTHROCYTE [DISTWIDTH] IN BLOOD BY AUTOMATED COUNT: 2.53 M/UL (ref 3.8–5.4)
ERYTHROCYTE [DISTWIDTH] IN BLOOD: 16.5 % (ref 11.5–15.5)
GLUCOSE SERPL-MCNC: 116 MG/DL (ref 74–99)
HCT VFR BLD AUTO: 24.2 % (ref 34–46)
HGB BLD-MCNC: 7.5 GM/DL (ref 11.4–16)
LYMPHOCYTES # SPEC AUTO: 1.2 K/UL (ref 1–4.8)
LYMPHOCYTES NFR SPEC AUTO: 10 %
MCH RBC QN AUTO: 29.8 PG (ref 25–35)
MCHC RBC AUTO-ENTMCNC: 31.1 G/DL (ref 31–37)
MCV RBC AUTO: 95.6 FL (ref 80–100)
MONOCYTES # BLD AUTO: 0.7 K/UL (ref 0–1)
MONOCYTES NFR BLD AUTO: 6 %
NEUTROPHILS # BLD AUTO: 9.2 K/UL (ref 1.3–7.7)
NEUTROPHILS NFR BLD AUTO: 80 %
PLATELET # BLD AUTO: 190 K/UL (ref 150–450)
POTASSIUM SERPL-SCNC: 4.7 MMOL/L (ref 3.5–5.1)
SODIUM SERPL-SCNC: 137 MMOL/L (ref 137–145)
WBC # BLD AUTO: 11.5 K/UL (ref 3.8–10.6)

## 2022-04-07 PROCEDURE — B2111ZZ FLUOROSCOPY OF MULTIPLE CORONARY ARTERIES USING LOW OSMOLAR CONTRAST: ICD-10-PCS

## 2022-04-07 PROCEDURE — 4A033BC MEASUREMENT OF ARTERIAL PRESSURE, CORONARY, PERCUTANEOUS APPROACH: ICD-10-PCS

## 2022-04-07 PROCEDURE — 027034Z DILATION OF CORONARY ARTERY, ONE ARTERY WITH DRUG-ELUTING INTRALUMINAL DEVICE, PERCUTANEOUS APPROACH: ICD-10-PCS

## 2022-04-07 RX ADMIN — IPRATROPIUM BROMIDE AND ALBUTEROL SULFATE SCH: .5; 3 SOLUTION RESPIRATORY (INHALATION) at 19:24

## 2022-04-07 RX ADMIN — SERTRALINE HYDROCHLORIDE SCH MG: 25 TABLET ORAL at 22:17

## 2022-04-07 RX ADMIN — IPRATROPIUM BROMIDE AND ALBUTEROL SULFATE SCH: .5; 3 SOLUTION RESPIRATORY (INHALATION) at 07:29

## 2022-04-07 RX ADMIN — ACETAMINOPHEN PRN MG: 325 TABLET, FILM COATED ORAL at 22:34

## 2022-04-07 RX ADMIN — PANTOPRAZOLE SODIUM SCH MG: 40 INJECTION, POWDER, FOR SOLUTION INTRAVENOUS at 11:07

## 2022-04-07 RX ADMIN — LORATADINE SCH MG: 10 TABLET ORAL at 11:08

## 2022-04-07 RX ADMIN — IPRATROPIUM BROMIDE AND ALBUTEROL SULFATE SCH: .5; 3 SOLUTION RESPIRATORY (INHALATION) at 10:51

## 2022-04-07 RX ADMIN — Medication SCH MG: at 17:01

## 2022-04-07 RX ADMIN — DIGOXIN SCH MCG: 125 TABLET ORAL at 11:07

## 2022-04-07 RX ADMIN — Medication SCH MG: at 22:17

## 2022-04-07 RX ADMIN — HEPARIN SODIUM ONE UNIT: 1000 INJECTION, SOLUTION INTRAVENOUS; SUBCUTANEOUS at 09:23

## 2022-04-07 RX ADMIN — ATORVASTATIN CALCIUM SCH MG: 40 TABLET, FILM COATED ORAL at 22:17

## 2022-04-07 RX ADMIN — Medication SCH: at 10:55

## 2022-04-07 RX ADMIN — OXYCODONE HYDROCHLORIDE AND ACETAMINOPHEN SCH MG: 500 TABLET ORAL at 17:01

## 2022-04-07 RX ADMIN — ENOXAPARIN SODIUM SCH: 40 INJECTION SUBCUTANEOUS at 11:24

## 2022-04-07 RX ADMIN — HEPARIN SODIUM ONE UNIT: 1000 INJECTION, SOLUTION INTRAVENOUS; SUBCUTANEOUS at 08:30

## 2022-04-07 RX ADMIN — BUDESONIDE AND FORMOTEROL FUMARATE DIHYDRATE SCH: 160; 4.5 AEROSOL RESPIRATORY (INHALATION) at 07:29

## 2022-04-07 RX ADMIN — HEPARIN SODIUM ONE UNIT: 1000 INJECTION, SOLUTION INTRAVENOUS; SUBCUTANEOUS at 08:54

## 2022-04-07 RX ADMIN — SERTRALINE HYDROCHLORIDE SCH MG: 100 TABLET ORAL at 22:17

## 2022-04-07 RX ADMIN — BUDESONIDE AND FORMOTEROL FUMARATE DIHYDRATE SCH: 160; 4.5 AEROSOL RESPIRATORY (INHALATION) at 19:24

## 2022-04-07 RX ADMIN — OXYCODONE HYDROCHLORIDE AND ACETAMINOPHEN SCH: 500 TABLET ORAL at 10:53

## 2022-04-07 RX ADMIN — ASPIRIN 81 MG CHEWABLE TABLET SCH: 81 TABLET CHEWABLE at 10:54

## 2022-04-07 RX ADMIN — SPIRONOLACTONE SCH MG: 25 TABLET, FILM COATED ORAL at 11:07

## 2022-04-07 RX ADMIN — HEPARIN SODIUM ONE UNIT: 1000 INJECTION, SOLUTION INTRAVENOUS; SUBCUTANEOUS at 09:07

## 2022-04-07 RX ADMIN — IPRATROPIUM BROMIDE AND ALBUTEROL SULFATE SCH: .5; 3 SOLUTION RESPIRATORY (INHALATION) at 15:24

## 2022-04-07 NOTE — P.PN
Subjective


Progress Note Date: 04/07/22





HISTORY OF PRESENT ILLNESS


This is a 71-year-old female with past medical history of dilated cardiomyopathy

with ejection fraction of 15-20%, severe aortic valve stenosis, mild aortic 

regurgitation with moderate to severe mitral regurgitation, chronic systolic 

heart failure, hyperlipidemia, obesity with possible obstructive sleep apnea and

obesity hypoventilation syndrome, chronic hypoxic respiratory failure on home O2

at 2 L nasal cannula, hypertension hypertensive cardio vascular disease, 

previous Covid 19 infection.  Patient presented to [facility] 

slight elevation of troponin, EKG showed changes suggestive ischemic changes and

was started on aspirin, heparin drip and admitted to the hospital.  Patient was 

seen by cardiology. She was supposed to have a heart catheterization done as an 

outpatient along with [facility] for possible aortic valve replacement and mitral valve

and possible CABG.  Patient underwent coronary angiography finding right 

dominant vessel, 99% ostial stenosis, aortic pressure is 100/70, 40 mm gradient 

across the aortic valve.  Left ventricular end diastolic pressure is 2530.  

Patient was transferred to [facility] for PTCA and stent 

placement of the RCA which was unsuccessful.  Consult with cardiothoracic 

surgery for CABG and valve repair/replacement.





4/1: Patient is seen today in the intensive care unit.  She denies chest pain or

shortness of breath. She complains of anxiety and decreased appetite. Less cough

today.She has been seen by intensivist and cardiothoracic surgery, currently on 

heparin drip, consult was added for dental evaluation and clearance and 

panoramic CT.  Patient has been afebrile, heart rate in the 80s, blood pressure 

101/73, pulse ox 96%.  Repeat blood work reveals CBC is unremarkable.  Potassium

4.0.  Urinalysis negative for infection.  Hepatitis panel negative.  MRSA nasal 

screen is in process.  CT of the chest revealed cardiomegaly with suspected 

pulmonary edema and moderate to marked right sided pleural effusion.  Associated

infection can't be excluded.


Echocardiogram reveals EF of 25-30% with mild concentric left ventricular 

hypertrophy, severe global hypokinesia of the LV, severe aortic stenosis with 

gradient 78.27 mm a new 3/47.94 mmHg, trace mitral regurgitation, mild mitral 

stenosis, mild tricuspid regurgitation, mild pulmonary hypertension, RVSP 43.33 

mmHg.


Carotid ultrasound revealed less than 50% stenosis bilateral carotid systems.





4/2: Patient is sitting up in bed she underwent CTA of the chest for evaluation 

of dissection of the right coronary artery, she denies any chest pain at this 

time, she has no shortness breath, she has no abdominal pain, nausea or 

vomiting, she continues to have some coughing noted from production, she 

continues to be somewhat depressed and anxious and she is not sure what to do, 

she is missing her daughter she stated and she doesn't think her daughter is 

able to come and see her.





4/3: Patient is sitting on the bed in no apparent distress, she continues to be 

quite depressed and anxious about her situation, she has not made up her mind, I

had a long conversation with Dr. Jacobs regarding the plan to pursue the [facility] care at this time, she was deemed high surgical risk per cardiovascular 

surgery service at the current [place], and she would benefit from a 

transcatheter aortic valve replacement plus reattempted PCI of the RCA, her CT 

angiography of the chest did not show evidence of any dissection of the thoracic

aorta , patient is maintained on Zoloft 100 mg every day, she is mentioned in 

ICU, and the plan to the PCI hopefully in the next 24 hours patient and her 

daughter Janneth are in agreement for conservative approach and they understand the

risks of the procedure and that she will have the TAVR at a later date or during

this hospital admission depends on her symptoms, we will consult Psychiatry for 

depression and possible bipolar disorder.





4/4: Patient remains in intensive care unit, afebrile, heart rate in the 80s, 

blood pressure 101/58, pulse ox 97% on room air.  Patient denies having any 

chest pain but continues to have anxiety issues.  Consult with psychiatry is 

pending.  Patient has been scheduled for CT TAVR protocol for today.  Repeat 

blood work reveals WBC 15, hemoglobin 8.6, platelet count 172.  Sodium 134, BUN 

41 creatinine 1.2.  Blood sugar 149.  Total bilirubin 1.4 otherwise liver 

function tests are normal.  Nasal MRSA screen is negative.





4/5: Patient remains in the intensive care unit.  Patient underwent CAT scan 

yesterday for TAVR protoxol and was found to have moderate size right pelvic 

retroperitoneal hematoma extending into the lower abdomen presumed from att

empted right groin catheter insertion.  Consult was added for vascular surgery 

with plan to continue supportive care, no plan for surgical intervention.  

Heparin was discontinued.  Patient was transfused 1 unit of packed RBCs for 

hemoglobin of 6.9 with repeat hemoglobin of 7.6.


Patient has been afebrile, heart rate 70, blood pressure 109/62, pulse ox 97%.  

Cardiac monitor is sinus rhythm.  Patient has also been seen and followed by 

psychiatry for depression and acute bereavement with the loss of her , 

recommendations Zoloft 125 mg daily at bedtime.





4/6: Patient remains in the intensive care unit.  No signs of bleeding and no 

worsening hematoma with hemoglobin stable at 7.6. Vascular surgery has cleared 

patient to resume heparin if needed, continue to monitor for bleeding and daily 

CBC.  Plan is for Dr. Jacobs will proceed with intervention tomorrow.  Patient

is also followed by intensivist, cardiothoracic surgery and psychiatry.





4/7: Patient is status post stent in the RCA today with Dr. Jacobs.  She is 

seen postprocedure and is comfortable.  She denies having any chest pain.  

Anxiety is controlled.  Approach was from the left groin, no bleeding or 

hematoma noted.  Patient remains afebrile, heart rate in the 80s, blood pressure

92/57, pulse ox 94% on 2 L nasal cannula.  Repeat hemoglobin is stable at 7.5, 

WBC 11.5.  Electrolytes are normal, BUN 21 creatinine 0.61.








REVIEW OF SYSTEMS


Constitutional: No fever, no chills, no night sweats.  No weight change.  

Positive for generalized weakness, positive for fatigue or lethargy.  No daytime

sleepiness.


HEENT: No headache.  No blurred vision or double vision, no loss of vision.  No 

loss of Hearing, no ringing in the ears, no dizziness.  No nasal drainage or 

congestion.  No epistaxis.  No sore throat.


Lungs: Positive for shortness of breath, positive for cough, no sputum 

production.  No wheezing.  Reports dyspnea on exertion.


Cardiovascular: No chest pain, no lower extremity edema.  No palpitations.  No 

paroxysmal nocturnal dyspnea.  No orthopnea.  No lightheadedness or dizziness.  

No syncopal episodes.


Abdominal: No right lower quadrant abdominal pain.  No nausea, vomiting.  No di

arrhea.  No constipation.  No bloody or tarry stools.  Reports loss of appetite.


Genitourinary: No dysuria, increased frequency, urgency.  No urinary retention.


Musculoskeletal: No myalgias.  Generalized muscle weakness, no gait dysfunction,

no frequent falls.  Positive for back pain.  No neck pain.


Integumentary: No wounds, no lesions.  No rash or pruritus.  Positive for 

bruising.  No change in hair or nails.


Neurologic: No aphasia. No facial droop. No change in mentation. No head injury.

No headache. No paralysis. No paresthesia.


Psychiatric: Positive for depression.  Reports anxiety.  Positive for mood 

swings


Endocrine: No abnormal blood sugars.  Positive for weight change.   





PHYSICAL EXAMINATION


Gen: This is a 71-year-old  female.


HEENT: Head is atraumatic, normocephalic. Pupils equal, round. Sclerae is 

anicteric. 


NECK: Supple. No JVD. No lymphadenopathy. No thyromegaly. 


LUNGS: Decreased breath sounds at the bases, decreased tactile fremitus at the 

right lower third, few rhonchi, no expiratory wheeze, no chest wall tenderness. 

No intercostal retractions.


HEART: First heart sound is depressed, second heart sound is normal, systolic 

ejection murmur 3/6 at the right upper sternal border radiating to the base of 

the neck, systolic ejection murmur 2/6 at the apex rating to the axilla.


ABDOMEN: Soft, mild tenderness right lower quadrant, nondistended, positive 

bowel sounds, multiple ecchymosis.


EXTREMITIES: 1+ pedal edema.  No calf tenderness.  Her cells pedis +1 

bilaterally.  Bilateral hammertoes.  Left groin is soft, no hematoma, no bl

eeding.


NEUROLOGICAL: Patient is awake, alert and oriented x3. Cranial nerves 2 through 

12 are grossly intact.,  Cranial nerves II-12 appear grossly intact, muscle 

power 4 out of 5 in upper and lower extremities bilaterally.





ASSESSMENT AND PLAN





1.   Non ST elevation  myocardial infarction with history of prior dilated 

cardiomyopathy status post coronary angiography finding right dominant vessel, 

99% ostial stenosis, aortic pressure is 100/70, 40 mm gradient across the aortic

valve.  Left ventricular end diastolic pressure is 2530.  Patient was 

transferred to [facility] for PTCA and stent placement of the 

RCA which was unsuccessful.  Consult with cardiothoracic surgery appreciated.  

Intensivist consultation appreciated.  Continue aspirin 81 mg daily, on Coreg 

6.25 mg twice daily.  Patient is unable to tolerate statins because of 

significant myopathy and plan is to start her on Repatha under 40 mg subcu every

2 weeks.  Stent of the RCA today with Dr. Jacobs 4/7.





2.  Dilated cardiomyopathy.  continue patient on carvedilol 3.125 mg orally t

wice every day, digoxin 125 g daily.  continue with monitoring input and output

and daily weight.  





3.  Severe aortic valve stenosis with mild aortic regurgitation.  TAVR on hold.





4.  Moderate to Severe mitral regurgitation. likely will continue with 

conservative management no plan for mitral valve repair. 





5.  Hyperlipidemia.  Patient unable to tolerate statins.  She was started on 

atorvastatin 40 mg once every day.





6.  Obesity with obstructive sleep apnea and obesity hypoventilation syndrome.  

Patient has not had sleep study done yet. 





7.  Chronic hypoxic respiratory failure secondary to his Covid 19 and underlying

COPD and possible obstructive sleep apnea and hypoventilation syndrome.  Patient

is normally on 2 L nasal cannula.





8.  Acute on chronic systolic heart failure.  Off Lasix, continue Coreg, 

Aldactone 25 mg daily.  





9.  Hypertension, hypertensive cardiovascular disease. continue carvedilol 3.125

mg orally twice every day patient is not on ACE-I or ARB due to severe hy

pertension,  





10.  Recurrent depression and generalized anxiety disorder.  Consult consult 

with psychiatry appreciated.  Patient on Zoloft 125 mg at bedtime


 


11.  DVT prophylaxis.    





12.  GI  prophylaxis.  Protonix 40 mg IV push daily.





13.  Overall prognosis is very guarded.  





14.  Acute retroperitoneal hematoma with acute blood loss anemia status post 

transfusion 1 unit of packed RBCs.  Consult with vascular surgery appreciated.  

Heparin has been discontinued.  Continue to monitor hemoglobin closely.











Impression and plan of care have been directed as dictated by the signing 

physician.  Charla Guevara nurse practitioner acting as scribe for signing 

physician.





Objective





- Vital Signs


Vital signs: 


                                   Vital Signs











Temp  97.7 F   04/07/22 04:00


 


Pulse  85   04/07/22 14:00


 


Resp  26 H  04/07/22 14:00


 


BP  92/57   04/07/22 14:00


 


Pulse Ox  95   04/07/22 14:00








                                 Intake & Output











 04/06/22 04/07/22 04/07/22





 18:59 06:59 18:59


 


Intake Total 220 240 390


 


Output Total 590 550 45


 


Balance -370 -310 345


 


Weight 111 kg  


 


Intake:   


 


   240 390


 


    Sodium Chloride 0.9% 1, 220 240 20





    000 ml @ 75 mls/hr IV .   





    [id number] SHANDA Rx#:[id number]   


 


    Sodium Chloride 0.9% 1,   320





    000 ml @ 80 mls/hr IV .   





    [id number] SHANDA Rx#:[id number]   


 


Output:   


 


  Urine 590 550 45


 


  Stool   0


 


Other:   


 


  Voiding Method Incontinent Incontinent Incontinent





 External Catheter External Catheter External Catheter


 


  # Voids  1 1








                       ABP, PAP, CO, CI - Last Documented











Arterial Blood Pressure        107/66

















- Labs


CBC & Chem 7: 


                                 04/07/22 11:57





                                 04/07/22 11:57


Labs: 


                  Abnormal Lab Results - Last 24 Hours (Table)











  04/07/22 04/07/22 Range/Units





  11:57 11:57 


 


WBC  11.5 H   (3.8-10.6)  k/uL


 


RBC  2.53 L   (3.80-5.40)  m/uL


 


Hgb  7.5 L   (11.4-16.0)  gm/dL


 


Hct  24.2 L   (34.0-46.0)  %


 


RDW  16.5 H   (11.5-15.5)  %


 


Neutrophils #  9.2 H   (1.3-7.7)  k/uL


 


BUN   21 H  (7-17)  mg/dL


 


Glucose   116 H  (74-99)  mg/dL

## 2022-04-07 NOTE — P.PN
Progress Note - Text


Progress Note Date: 04/07/22





Attempted to see patient.  Patient was down for cardiac catheterization.  There 

was no reported acute changes through the night.  No current labs today.








The impression and plan of care has been dictated as directed.








I performed a history and examination of this patient,  discussed the same with 

the dictator.  I agree with the dictator's note ,documented as a scribe.  Any 

additional findings or plans will be noted.

## 2022-04-07 NOTE — P.PRCINT
Percutaneous Coronary Int.





- Percutaneous Coronary Intervention


Percutaneous Coronary Intervention: 





PROCEDURES PERFORMED:Bilateral coronary angiography, iFR LAD, PCI proximal RCA 

with 3.25 x 33mm Xience AMADO, post dilated proximally with 4.0 NC balloon, 

Angioseal left femoral artery





INDICATION: NSTEMI, CMP, severe aortic stenosis





HPI:  Patient is a pleasant 71 year old female who presented with SOB as well as

nausea and was found to have minimally elevated troponins as well as severe 

aortic stenosis with calculated NICHO of 0.2cm2.  Diagnostic catheterization 

showed 99% RCA stenosis as well as a mid LAD 60% stenosis with what appeared to 

be left to right collaterals.  She has severe pulmonary disease and initially 

recommended to undergo PCI with TAVR.  She underwent attempted PCI however was 

noted to have small aortic root dissection and therefore further attempts were 

deferred and patient was evaluated by cardiothoracic surgery.  Patient remained 

somewhat stable from a cardiac standpoint and was deemed high risk for CABG and 

SAVR and therefore recommended to have high risk reattempt PCI.  Unfortunately 

she also had spontaneous bleed both right anterior femoral site around previous 

catheterization site as well as CTA showing evidence of retroperitoneal bleed.  

She recieved IV blood transfusion and hgb had remained stable over the prior 24 

hrs and therefore benefits of PCI felt to outweigh risk of further bleed.  

Elevated risks of procedure were discussed in detail with patient and daughter 

and both were agreeable to attempt PCI RCA and further assessment of LAD lesion.





PROCEDURE: After the risks, benefits and alternatives of the above mentioned 

procedure explained in detail with the patient, informed consent was obtained.  

Patient was taken to the catheterization lab and prepped and draped in usual 

fashion.  1% lidocaine was used to anesthetize the left femoral area.  A 8-

English sheath was placed in the left femoral artery using modified Seldinger 

technique, ultrasound guidance.  Prior attempted PCI was complicated by poor 

backup support and therefore decision was made to perform PCI with 8Fr guide.  





The decision was made to  perform PCI of the RCA.  Heparin was given.  A 8 Fr 

FR4 guide was used to engage the RCA with very small manipulations given prior 

aortic root dissection.  There was a small aortic root dissection flap noted 

however appeared contained and stable on repeat imaging and therefore benefits 

appeared to outweigh risks and proceeded with attempted PCI.  Next a 0.014 

whisper wire was able to be advanced after a few minutes of manipulation into 

the distal PLV.  With the help of a 6Fr Guideliner, a 1.0 Saphire balloon was 

able to be advanced and predilation was performed.  Next predilation was furtehr

performed with a 1.5, 2.5 mm balloon.  Next a 3.25 x 33mm stent was advanced and

deployed with 1mm extending out into the aorta to cover the ostium.  The 

proximal portion of the stent was "flared" with a 4.0 NC balloon.   

Preintervention there was 99% stenosis and IGGY-1 flow and post intervention 

there was 0% stenosis and IGGY-3 flow.  There was more distal PDA and PLV 

stenosis felt likely related to spasm with inability to give Nitro due to low 

MAP in 60's.





Next the decision was made to perform iFR of the LAD.  A 6Fr CLS 3.5 guide was 

used to engage the left main.  Next, a 0.014 pressure wire was advanced into the

proximal left main and normalized.  The wire was then advanced into the mid LAD,

1-2 cm past the lesion in the mid LAD.  iFR was abnormal at 0.54  With 

engagement into left main, patient's BP on both femoral and catheter 

measurements had dropped into the systolics 70's appearing to relate to 

occlusion of left main with extremely poor reserve related to severe aortic 

stenosis and cardiomyopathy.  Pullback showed main pressure drop was at the 

proximal LAD 60% stenosis just before the moderate caliber diagonal branch. LAD 

lesion did not appear critical and appeared would likely need balloon aortic 

valvuloplasty and placement of Impella and therefore deferred. The catheter was 

then removed.





A left femoral angiogram showed adequate anatomy for closure and therefore a 8Fr

Angioseal was placed with hemostasis achieved.  The patient tolerated the 

procedure well.  Patient was transported back to the post catheterization hol

ding area in stable condition.





Conscious Sedation: Patient was monitored under the direct supervision of vision

of myself for conscious sedation using Versed and fentanyl for a total duration 

of 90 minutes   


HEMODYNAMICS: 


Ao: 86/55





SELECTIVE CORONARY ARTERIOGRAPHY: 


LEFT MAIN: The left main is a large caliber vessel which bifurcates into the LAD

and circumflex.  The left main has no significant stenosis.


LEFT ANTERIOR DESCENDING CORONARY ARTERY: LAD is a large caliber vessel which 

wraps around to the apex.  There is a proximal 60-70% LAD stenosis and a mid LAD

70% stenosis just after a moderate caliber diagonal 1 branch with iFR abnormal 

previously at 0.0.54.  Diagonal 1 has a proximal 50-60% stenosis.


LEFT CIRCUMFLEX CORONARY ARTERY: Left circumflex is a moderate caliber vessel 

with mild disease 30-40% stenosis.


RIGHT CORONARY ARTERY: The right coronary artery is a large caliber vessel which

gives off a PDA and PLV branch and is the dominant vessel.  The RCA has a 

proximal 99% stenosis.  There is a small aortic root dissection flap near the 

ostium of the RCA however did not appear flow limiting and appeared similar to 

prior from prior images.  There is a mid RCA 50% stenosis as well as proximal 

PDA 70% lesion and a PLV 70% lesion which appeared similar to diagnostic images 

and felt may be some component of spasm.








FINAL IMPRESSION: 


1.  CAD as described above with 99% RCA stenosis, 60-70% proximal LAD stenosis 

(iFR abnormal at 0.54) and circumflex 30-40% stenosis.


2.  S/p PCI proximal to RCA with 3.25 x 33mm Xience AMADO, post dilated with a 4.0

mm NC balloon





PLAN: 


1.  Aggressive risk factor modification per most recent ACC/AHA guidelines.


2.  Continue dual antiplatelets with aspirin and Plavix.


3.  Monitor hemoglobin, femoral sites closely.


4.  Patient with poor cardiac reserve with drop in SBP into 70's with simple 

engagement of left main with guiding catheter related to critical AS and 

cardiomyopathy.  LAD lesion does not appear critical and would require balloon 

aortic vavluloplasty with placement of Impella to safely perform and therefore 

would recommend proceed with TAVR prior to any PCI of LAD.  Likely staged PCI 

after TAVR pending clinical progress.

## 2022-04-07 NOTE — P.PN
Subjective


Progress Note Date: 04/07/22











This is a pleasant 71-year-old female patient with biventricular failure, severe

aortic stenosis, mitral regurgitation and coronary artery disease.  The patient 

underwent a cardiac catheterization and the patient was found to have 99% RCA 

stenosis and the patient is post non-ST segment elevation myocardial infarction.

 The patient had an unsuccessful PCI of the RCA.  Initially, she was being 

contemplated for cardiac surgery knowing that she has valvular heart disease and

she was also being looks for a single-vessel bypass surgery.  Ultimately, it was

decided to do another cardiac catheterization tomorrow.  She is known to have 

dilated cardiomyopathy and impaired left ventricular ejection fraction at 

patient presented with acute CHF.  She is currently on O2 at 2 L per minute 

nasal cannula.  She is comfortable.  Nevertheless, she is anxious and she denies

having any chest pain.  Hemodynamically stable on no pressors.  She continues to

be guarded with Lasix and she is on 8040 mg IV every 12 hours.  She is off IV 

heparin as the patient developed an acute hematoma the right femoral artery/tamia

in area at the site of the cardiac catheterization.  The hematoma today is quite

soft and the patient has a hemoglobin of 8.6 which is essentially compatible to 

yesterday of 9.1.  There has been some mild drop in hemoglobin 9 of the 

hemoglobin on 04/03/2022 was 10.0.  Platelet counts are normal.  The hematoma in

the right groin is soft.  The plan is to proceed with another cardiac 

catheterization hopefully within the next 24 hours.  Of notice, isn't elevation 

in the creatinine which is up to 1.2 on today's evaluation.  The neck fluid 

balance over the past 24 hours has been in the order of +98 mL on this patient. 

Based on that, the Lasix has been placed on hold.  Meanwhile, the patient veena

nues to be on IV heparin infusion.  Note that the IV heparin was discontinued.  

Now that there is no active bleeding from the hematoma, cardiology gave orders 

to restart the IV heparin.





04/05/2020, the patient is being seen for a follow-up.  Overnight, the patient 

developed further drop in hemoglobin down to 6.9 as the patient was having some 

retroperitoneal bleeding.  IV heparin was discontinued.  The patient got 

transfused with a 1 units of packed RBC.  CAT scan of the chest abdomen and 

pelvis was done and it showed evidence of retroperitoneal bleed on the right 

displacing bladder.  There was also a small to moderate-sized right-sided 

pleural effusion and some pulmonary vascular congestion.  Based on that, IV 

heparin was discontinued and the patient was given a total of 1 units of packed 

RBC and hemoglobin today is at 7.6.  History of any chest pain.  She continues 

to have some shortness of breath at rest. Fluid balance has been +1.2 L over the

past 24 hours.  She is currently on 2 L about 2 by nasal cannula.  The plan is 

to undergo a cardiac catheterization and stenting of the RCA and cardiology is 

on the case.  Despite the drop in hemoglobin, and ongoing esophageal bleed, the 

patient's groin is soft and there is a soft area of ecchymosis along the right 

groin area.  Pulses in lower extremity is diminished at the present.





04/06/2022, condition is stable and unchanged compared to yesterday.  No signs 

of any ongoing bleeding and the patient's hemoglobin today is at 7.6 which is 

comparable to yesterday.  No change in the hematoma along the right groin area. 

No anticoagulants for now the patient is taken on Lovenox for prophylaxis.  The 

patient has no new complaints.  Resting comfortably in bed.  No coronary 

intervention was done and the cardiac interventional cardiology, decided to wait

a few more days until this is a pleasant hematoma is more settled prior doing a

ny intervention.  Meanwhile, the patient remains on 2 L of O2 by nasal cannula. 

Input output balance has been +230 mL over the past 24 hours.  BUN is a 36 with 

a creatinine of 0.6 and the potassium level of 4.6 and his sodium level of 134. 

White cell count is at 9.8 and the plated count is at 152.  No altered 

mentation.  The patient remains on aspirin.  The patient on Lovenox for DVT 

prophylaxis 40 mg subcu.  The patient remains on Coreg 3.125 mg by mouth twice a

day and digoxin and Aldactone.





04/07/2022, condition is stable.  No new complaints overnight.  The patient will

be taken to cardiac catheterization.  No acute chest pain or shortness of breath

overnight.  Most recent hemoglobin from yesterday was at 7.6 and the patient has

not had any further hemoglobins checked since yesterday.  Nevertheless, there is

no signs of any ongoing bleed and the groin bleed/hematoma remains stable.  The 

patient has no chest pain.  No shortness of breath.  The patient on no 

anticoagulants for now.  The plan will be a cardiac catheterization today.  The 

patient remains on Lovenox 40 mg subcu 40 to prophylaxis.  The patient on 

Aldactone.  The patient is on a combination of aspirin and Plavix.  Awaiting 

final recommendations from cardiology post cardiac catheterization.





Objective





- Vital Signs


Vital signs: 


                                   Vital Signs











Temp  97.7 F   04/07/22 04:00


 


Pulse  85   04/07/22 10:30


 


Resp  12   04/07/22 10:30


 


BP  104/69   04/07/22 10:30


 


Pulse Ox  96   04/07/22 10:30








                                 Intake & Output











 04/06/22 04/07/22 04/07/22





 18:59 06:59 18:59


 


Intake Total 220 240 70


 


Output Total 590 550 45


 


Balance -370 -310 25


 


Weight 111 kg  


 


Intake:   


 


   240 70


 


    Sodium Chloride 0.9% 1, 220 240 20





    000 ml @ 75 mls/hr IV .   





    [id number] [facility] Rx#:[id number]   


 


Output:   


 


  Urine 590 550 45


 


  Stool   0


 


Other:   


 


  Voiding Method Incontinent Incontinent Incontinent





 External Catheter External Catheter External Catheter


 


  # Voids  1 








                       ABP, PAP, CO, CI - Last Documented











Arterial Blood Pressure        107/66

















- Exam








CONSTITUTIONAL:  Tearful, cooperative and is in no acute distress.  The patient 

remains anxious.


Head exam was generally normal. There was no scleral icterus or corneal arcus. 

Mucous membranes were moist.


Neck was supple and without jugular venous distension, thyromegaly, or carotid 

bruits. Carotids were easily palpable bilaterally. There was no adenopathy.


RESPIRATORY:  Lungs sounds essentially clear throughout, diminished bilateral 

bases, right greater than left.  Respirations are symmetrical and nonlabored.  

Currently on 2 L nasal cannula with oxygen saturation 96%.  Able to achieve 1500

mL on incentive spirometry.  Strong cough.  


CARDIOVASCULAR:  S1, S2 present, pansystolic murmur present 3/6.  Regular rate 

and rhythm, sinus rhythm on telemetry.  Doppler lower extremity pulses 

bilaterally.  No calf pain or tenderness noted. 


GASTROINTESTINAL:  Abdomen soft, nontender, nondistended.  Active bowel sounds 

present 4 quadrants.  Tolerating diet.  A soft hematoma is present over the 

right inguinal area which has not enlarged in size.


INTEGUMENTARY:  Skin is warm and dry with evidence of good perfusion.  


NEUROLOGIC:  Cranial nerves II through XII intact


MUSKULOSKELETAL:  Able to move all extremities, strength equal bilaterally.


PSYCHIATRIC:  Alert, oriented to person place and time, flat affect.











- Labs


CBC & Chem 7: 


                                 04/06/22 08:37





                                 04/06/22 07:25





Assessment and Plan


Plan: 











1 acute hypoxic respiratory failure, currently on 2 L of oxygen by nasal 

cannula, essentially secondary to valvular heart disease/CAD and secondary 

decompensated heart failure.  Clinically stable, awaiting cardiac 

catheterization





2 acute non-ST segment elevation myocardial infarction.  The patient is post 

cardiac catheterization and the patient is a 99% RCA stenosis and the patient is

status post unsuccessful PCI to RCA.  The patient is being considered for 

another cardiac catheterization by cardiology and stenting of the RCA.  For now 

the patient is feeling of any chest pain and the patient is currently off IV 

heparin, awaiting cardiac catheterization to be done today





3 severe aortic valve stenosis with a peak and mean gradient of 78 and 47 mmHg, 

and the patient continues to have a harsh cardiac murmur systolic ejection on 

examination.





4 mitral regurgitation





5 dilated cardiomyopathy with impaired left ventricular ejection fraction 

systolic heart failure with an ejection fraction of 25-30%





6 bilateral pleural effusion secondary to above





7 hypertension





8 hyperlipidemia





9 COVID 19 pneumonia back in September 2021





10 retroperitoneal bleed with a development of a soft right groin hematoma at a 

set of cardiac catheterization,  the hemoglobin remains stable for now and there

is no interval worsening in the right groin hematoma





11 acute blood loss anemia with a hemoglobin of  6.9 and the patient got 

transfused with a total of 1 units of packed RBC and IV heparin was di

scontinued.  The patient's hemoglobin is stable at 7.6 on today's evaluation





12 acute kidney injury improving and the creatinine is normalized





13 medical debility seconds above-mentioned comorbidities





14 right-sided pleural effusion secondary to CHF





Plan





Cardiac catheterization today


Further, this is to follow based on the results and successful stenting of the 

RCA


Continue monitoring this patient in the intensive care unit


 the hematoma in the right groin area is rather soft and there is no signs of 

ongoing bleeding at this point in time.  Hemoglobin remains stable.


Keep the patient off IV heparin for now


Continue aspirin and statins and beta blockers


Cardiothoracic surgeries on the case regarding possibility of bypass/aortic 

valve replacement versus PCI and possibly TaVR procedure regarding the aortic 

valve.   


Long-term prognosis poor baseline above-mentioned comorbidities.  


We'll continue to follow

## 2022-04-08 LAB
ANION GAP SERPL CALC-SCNC: 4 MMOL/L
BASOPHILS # BLD AUTO: 0.1 K/UL (ref 0–0.2)
BASOPHILS NFR BLD AUTO: 1 %
BUN SERPL-SCNC: 19 MG/DL (ref 7–17)
CALCIUM SPEC-MCNC: 8.2 MG/DL (ref 8.4–10.2)
CHLORIDE SERPL-SCNC: 104 MMOL/L (ref 98–107)
CO2 SERPL-SCNC: 28 MMOL/L (ref 22–30)
EOSINOPHIL # BLD AUTO: 0.3 K/UL (ref 0–0.7)
EOSINOPHIL NFR BLD AUTO: 3 %
ERYTHROCYTE [DISTWIDTH] IN BLOOD BY AUTOMATED COUNT: 2.57 M/UL (ref 3.8–5.4)
ERYTHROCYTE [DISTWIDTH] IN BLOOD: 18.1 % (ref 11.5–15.5)
GLUCOSE SERPL-MCNC: 115 MG/DL (ref 74–99)
HCT VFR BLD AUTO: 24.9 % (ref 34–46)
HGB BLD-MCNC: 7.7 GM/DL (ref 11.4–16)
LYMPHOCYTES # SPEC AUTO: 0.9 K/UL (ref 1–4.8)
LYMPHOCYTES NFR SPEC AUTO: 8 %
MAGNESIUM SPEC-SCNC: 2.3 MG/DL (ref 1.6–2.3)
MCH RBC QN AUTO: 30 PG (ref 25–35)
MCHC RBC AUTO-ENTMCNC: 31 G/DL (ref 31–37)
MCV RBC AUTO: 96.8 FL (ref 80–100)
MONOCYTES # BLD AUTO: 0.7 K/UL (ref 0–1)
MONOCYTES NFR BLD AUTO: 6 %
NEUTROPHILS # BLD AUTO: 8.7 K/UL (ref 1.3–7.7)
NEUTROPHILS NFR BLD AUTO: 81 %
PLATELET # BLD AUTO: 182 K/UL (ref 150–450)
POTASSIUM SERPL-SCNC: 4.4 MMOL/L (ref 3.5–5.1)
SODIUM SERPL-SCNC: 136 MMOL/L (ref 137–145)
WBC # BLD AUTO: 10.8 K/UL (ref 3.8–10.6)

## 2022-04-08 PROCEDURE — 05HD33Z INSERTION OF INFUSION DEVICE INTO RIGHT CEPHALIC VEIN, PERCUTANEOUS APPROACH: ICD-10-PCS

## 2022-04-08 RX ADMIN — IPRATROPIUM BROMIDE AND ALBUTEROL SULFATE SCH: .5; 3 SOLUTION RESPIRATORY (INHALATION) at 20:29

## 2022-04-08 RX ADMIN — ASPIRIN 81 MG CHEWABLE TABLET SCH MG: 81 TABLET CHEWABLE at 09:29

## 2022-04-08 RX ADMIN — IPRATROPIUM BROMIDE AND ALBUTEROL SULFATE SCH: .5; 3 SOLUTION RESPIRATORY (INHALATION) at 15:52

## 2022-04-08 RX ADMIN — BUDESONIDE AND FORMOTEROL FUMARATE DIHYDRATE SCH: 160; 4.5 AEROSOL RESPIRATORY (INHALATION) at 08:11

## 2022-04-08 RX ADMIN — BUDESONIDE AND FORMOTEROL FUMARATE DIHYDRATE SCH: 160; 4.5 AEROSOL RESPIRATORY (INHALATION) at 20:29

## 2022-04-08 RX ADMIN — DIGOXIN SCH MCG: 125 TABLET ORAL at 09:30

## 2022-04-08 RX ADMIN — PANTOPRAZOLE SODIUM SCH MG: 40 INJECTION, POWDER, FOR SOLUTION INTRAVENOUS at 09:29

## 2022-04-08 RX ADMIN — ENOXAPARIN SODIUM SCH MG: 40 INJECTION SUBCUTANEOUS at 09:28

## 2022-04-08 RX ADMIN — ACETAMINOPHEN PRN MG: 325 TABLET, FILM COATED ORAL at 13:43

## 2022-04-08 RX ADMIN — Medication SCH MG: at 09:30

## 2022-04-08 RX ADMIN — IPRATROPIUM BROMIDE AND ALBUTEROL SULFATE SCH: .5; 3 SOLUTION RESPIRATORY (INHALATION) at 11:29

## 2022-04-08 RX ADMIN — ACETAMINOPHEN PRN MG: 325 TABLET, FILM COATED ORAL at 05:51

## 2022-04-08 RX ADMIN — OXYCODONE HYDROCHLORIDE AND ACETAMINOPHEN SCH MG: 500 TABLET ORAL at 09:32

## 2022-04-08 RX ADMIN — BUDESONIDE AND FORMOTEROL FUMARATE DIHYDRATE SCH PUFF: 160; 4.5 AEROSOL RESPIRATORY (INHALATION) at 07:57

## 2022-04-08 RX ADMIN — SERTRALINE HYDROCHLORIDE SCH MG: 100 TABLET ORAL at 20:51

## 2022-04-08 RX ADMIN — SPIRONOLACTONE SCH MG: 25 TABLET, FILM COATED ORAL at 09:32

## 2022-04-08 RX ADMIN — Medication SCH MG: at 20:52

## 2022-04-08 RX ADMIN — ATORVASTATIN CALCIUM SCH MG: 40 TABLET, FILM COATED ORAL at 20:51

## 2022-04-08 RX ADMIN — LORATADINE SCH MG: 10 TABLET ORAL at 09:28

## 2022-04-08 RX ADMIN — Medication SCH MG: at 17:59

## 2022-04-08 RX ADMIN — IPRATROPIUM BROMIDE AND ALBUTEROL SULFATE SCH ML: .5; 3 SOLUTION RESPIRATORY (INHALATION) at 07:57

## 2022-04-08 RX ADMIN — OXYCODONE HYDROCHLORIDE AND ACETAMINOPHEN SCH: 500 TABLET ORAL at 09:59

## 2022-04-08 RX ADMIN — OXYCODONE HYDROCHLORIDE AND ACETAMINOPHEN SCH: 500 TABLET ORAL at 17:59

## 2022-04-08 RX ADMIN — CLOPIDOGREL BISULFATE SCH MG: 75 TABLET ORAL at 09:27

## 2022-04-08 RX ADMIN — SERTRALINE HYDROCHLORIDE SCH MG: 25 TABLET ORAL at 20:51

## 2022-04-08 NOTE — P.PN
Subjective


Progress Note Date: 04/08/22





HISTORY OF PRESENT ILLNESS


This is a 71-year-old female with past medical history of dilated cardiomyopathy

with ejection fraction of 15-20%, severe aortic valve stenosis, mild aortic 

regurgitation with moderate to severe mitral regurgitation, chronic systolic 

heart failure, hyperlipidemia, obesity with possible obstructive sleep apnea and

obesity hypoventilation syndrome, chronic hypoxic respiratory failure on home O2

at 2 L nasal cannula, hypertension hypertensive cardio vascular disease, 

previous Covid 19 infection.  Patient presented to [facility] 

slight elevation of troponin, EKG showed changes suggestive ischemic changes and

was started on aspirin, heparin drip and admitted to the hospital.  Patient was 

seen by cardiology. She was supposed to have a heart catheterization done as an 

outpatient along with [facility] for possible aortic valve replacement and mitral valve

and possible CABG.  Patient underwent coronary angiography finding right 

dominant vessel, 99% ostial stenosis, aortic pressure is 100/70, 40 mm gradient 

across the aortic valve.  Left ventricular end diastolic pressure is 2530.  

Patient was transferred to [facility] for PTCA and stent 

placement of the RCA which was unsuccessful.  Consult with cardiothoracic 

surgery for CABG and valve repair/replacement.





4/1: Patient is seen today in the intensive care unit.  She denies chest pain or

shortness of breath. She complains of anxiety and decreased appetite. Less cough

today.She has been seen by intensivist and cardiothoracic surgery, currently on 

heparin drip, consult was added for dental evaluation and clearance and 

panoramic CT.  Patient has been afebrile, heart rate in the 80s, blood pressure 

101/73, pulse ox 96%.  Repeat blood work reveals CBC is unremarkable.  Potassium

4.0.  Urinalysis negative for infection.  Hepatitis panel negative.  MRSA nasal 

screen is in process.  CT of the chest revealed cardiomegaly with suspected 

pulmonary edema and moderate to marked right sided pleural effusion.  Associated

infection can't be excluded.


Echocardiogram reveals EF of 25-30% with mild concentric left ventricular 

hypertrophy, severe global hypokinesia of the LV, severe aortic stenosis with 

gradient 78.27 mm a new 3/47.94 mmHg, trace mitral regurgitation, mild mitral 

stenosis, mild tricuspid regurgitation, mild pulmonary hypertension, RVSP 43.33 

mmHg.


Carotid ultrasound revealed less than 50% stenosis bilateral carotid systems.





4/2: Patient is sitting up in bed she underwent CTA of the chest for evaluation 

of dissection of the right coronary artery, she denies any chest pain at this 

time, she has no shortness breath, she has no abdominal pain, nausea or 

vomiting, she continues to have some coughing noted from production, she 

continues to be somewhat depressed and anxious and she is not sure what to do, 

she is missing her daughter she stated and she doesn't think her daughter is 

able to come and see her.





4/3: Patient is sitting on the bed in no apparent distress, she continues to be 

quite depressed and anxious about her situation, she has not made up her mind, I

had a long conversation with Dr. Jacobs regarding the plan to pursue the [facility] care at this time, she was deemed high surgical risk per cardiovascular 

surgery service at the current [facility], and she would benefit from a 

transcatheter aortic valve replacement plus reattempted PCI of the RCA, her CT 

angiography of the chest did not show evidence of any dissection of the thoracic

aorta , patient is maintained on Zoloft 100 mg every day, she is mentioned in 

ICU, and the plan to the PCI hopefully in the next 24 hours patient and her 

daughter Janneth are in agreement for conservative approach and they understand the

risks of the procedure and that she will have the TAVR at a later date or during

this hospital admission depends on her symptoms, we will consult Psychiatry for 

depression and possible bipolar disorder.





4/4: Patient remains in intensive care unit, afebrile, heart rate in the 80s, 

blood pressure 101/58, pulse ox 97% on room air.  Patient denies having any 

chest pain but continues to have anxiety issues.  Consult with psychiatry is 

pending.  Patient has been scheduled for CT TAVR protocol for today.  Repeat 

blood work reveals WBC 15, hemoglobin 8.6, platelet count 172.  Sodium 134, BUN 

41 creatinine 1.2.  Blood sugar 149.  Total bilirubin 1.4 otherwise liver 

function tests are normal.  Nasal MRSA screen is negative.





4/5: Patient remains in the intensive care unit.  Patient underwent CAT scan 

yesterday for TAVR protoxol and was found to have moderate size right pelvic 

retroperitoneal hematoma extending into the lower abdomen presumed from att

empted right groin catheter insertion.  Consult was added for vascular surgery 

with plan to continue supportive care, no plan for surgical intervention.  

Heparin was discontinued.  Patient was transfused 1 unit of packed RBCs for 

hemoglobin of 6.9 with repeat hemoglobin of 7.6.


Patient has been afebrile, heart rate 70, blood pressure 109/62, pulse ox 97%.  

Cardiac monitor is sinus rhythm.  Patient has also been seen and followed by 

psychiatry for depression and acute bereavement with the loss of her , 

recommendations Zoloft 125 mg daily at bedtime.





4/6: Patient remains in the intensive care unit.  No signs of bleeding and no 

worsening hematoma with hemoglobin stable at 7.6. Vascular surgery has cleared 

patient to resume heparin if needed, continue to monitor for bleeding and daily 

CBC.  Plan is for Dr. Jacobs will proceed with intervention tomorrow.  Patient

is also followed by intensivist, cardiothoracic surgery and psychiatry.





4/7: Patient is status post stent in the RCA today with Dr. Jacobs.  She is 

seen postprocedure and is comfortable.  She denies having any chest pain.  

Anxiety is controlled.  Approach was from the left groin, no bleeding or 

hematoma noted.  Patient remains afebrile, heart rate in the 80s, blood pressure

92/57, pulse ox 94% on 2 L nasal cannula.  Repeat hemoglobin is stable at 7.5, 

WBC 11.5.  Electrolytes are normal, BUN 21 creatinine 0.61.





4/8: Patient remains in the intensive care unit status post stent of the RCA 

yesterday.  Patient has moderate to severe LAD lesion with significant FFR to be

addressed at a later time as well asTAVR will be planned for the next 1-2 weeks.

 No hematoma to the left groin.  Patient is waiting for a bed on the cardiac 

stepdown unit. Vascular surgeries following for retroperitoneal hematoma that 

does not appear to be actively bleeding, plan for supportive care.  Patient 

remains afebrile heart rate 85, blood pressure 96/54, pulse ox 98% on room air..

 BUN 19 and creatinine 0.62.  Anticipate that patient will be able to work with 

PT and OT determine home care versus subacute rehab needs.








REVIEW OF SYSTEMS


Constitutional: No fever, no chills, no night sweats.  No weight change.  

Positive for generalized weakness, positive for fatigue or lethargy.  No daytime

sleepiness.


HEENT: No headache.  No blurred vision or double vision, no loss of vision.  No 

loss of Hearing, no ringing in the ears, no dizziness.  No nasal drainage or 

congestion.  No epistaxis.  No sore throat.


Lungs: Positive for shortness of breath, positive for cough, no sputum 

production.  No wheezing.  Reports dyspnea on exertion.


Cardiovascular: No chest pain, no lower extremity edema.  No palpitations.  No 

paroxysmal nocturnal dyspnea.  No orthopnea.  No lightheadedness or dizziness.  

No syncopal episodes.


Abdominal: No right lower quadrant abdominal pain.  No nausea, vomiting.  No 

diarrhea.  No constipation.  No bloody or tarry stools.  Reports loss of 

appetite.


Genitourinary: No dysuria, increased frequency, urgency.  No urinary retention.


Musculoskeletal: No myalgias.  Generalized muscle weakness, no gait dysfunction,

no frequent falls.  Positive for back pain.  No neck pain.


Integumentary: No wounds, no lesions.  No rash or pruritus.  Positive for 

bruising.  No change in hair or nails.


Neurologic: No aphasia. No facial droop. No change in mentation. No head injury.

No headache. No paralysis. No paresthesia.


Psychiatric: Positive for depression.  Reports anxiety.  Positive for mood 

swings


Endocrine: No abnormal blood sugars.  Positive for weight change.   





PHYSICAL EXAMINATION


Gen: This is a 71-year-old  female.


HEENT: Head is atraumatic, normocephalic. Pupils equal, round. Sclerae is 

anicteric. 


NECK: Supple. No JVD. No lymphadenopathy. No thyromegaly. 


LUNGS: Decreased breath sounds at the bases, decreased tactile fremitus at the 

right lower third, few rhonchi, no expiratory wheeze, no chest wall tenderness. 

No intercostal retractions.


HEART: First heart sound is depressed, second heart sound is normal, systolic 

ejection murmur 3/6 at the right upper sternal border radiating to the base of 

the neck, systolic ejection murmur 2/6 at the apex rating to the axilla.


ABDOMEN: Soft, mild tenderness right lower quadrant, nondistended, positive 

bowel sounds, multiple ecchymosis.


EXTREMITIES: 1+ pedal edema.  No calf tenderness.  Her cells pedis +1 

bilaterally.  Bilateral hammertoes.  Left groin is soft, no hematoma, no 

bleeding.


NEUROLOGICAL: Patient is awake, alert and oriented x3. Cranial nerves 2 through 

12 are grossly intact.,  Cranial nerves II-12 appear grossly intact, muscle 

power 4 out of 5 in upper and lower extremities bilaterally.





ASSESSMENT AND PLAN





1.   Non ST elevation  myocardial infarction with history of prior dilated 

cardiomyopathy status post coronary angiography finding right dominant vessel, 

99% ostial stenosis, aortic pressure is 100/70, 40 mm gradient across the aortic

valve.  Left ventricular end diastolic pressure is 2530.  Patient was 

transferred to [facility] for PTCA and stent placement of the 

RCA which was unsuccessful.  Consult with cardiothoracic surgery appreciated.  

Intensivist consultation appreciated.  Continue aspirin 81 mg daily, on Coreg 

6.25 mg twice daily.  Patient is unable to tolerate statins because of 

significant myopathy and plan is to start her on Repatha under 40 mg subcu every

2 weeks.  Stent of the RCA today with Dr. Jacobs 4/7.LAD stenosis to be 

addressed at a later time.





2.  Dilated cardiomyopathy.  continue patient on carvedilol 3.125 mg orally 

twice every day, digoxin 125 g daily.  continue with monitoring input and 

output and daily weight.  





3.  Severe aortic valve stenosis with mild aortic regurgitation.  TAVR to be 

scheduled in the near future.





4.  Moderate to Severe mitral regurgitation. likely will continue with 

conservative management no plan for mitral valve repair. 





5.  Hyperlipidemia.  Patient unable to tolerate statins.  She was started on 

atorvastatin 40 mg once every day.





6.  Obesity with obstructive sleep apnea and obesity hypoventilation syndrome.  

Patient has not had sleep study done yet. 





7.  Chronic hypoxic respiratory failure secondary to his Covid 19 and underlying

COPD and possible obstructive sleep apnea and hypoventilation syndrome.  Patient

is normally on 2 L nasal cannula.





8.  Acute on chronic systolic heart failure.  Off Lasix, continue Coreg, 

Aldactone 25 mg daily.  





9.  Hypertension, hypertensive cardiovascular disease. continue carvedilol 3.125

mg orally twice every day patient is not on ACE-I or ARB due to severe 

hypertension,  





10.  Recurrent depression and generalized anxiety disorder.  Consult consult 

with psychiatry appreciated.  Patient on Zoloft 125 mg at bedtime


 


11.  DVT prophylaxis.    





12.  GI  prophylaxis.  Protonix 40 mg IV push daily.





13.  Overall prognosis is very guarded.  





14.  Acute retroperitoneal hematoma with acute blood loss anemia status post 

transfusion 1 unit of packed RBCs.  Consult with vascular surgery appreciated. 

No plan for any surgical interventions.  Monitor closely for bleeding.








Impression and plan of care have been directed as dictated by the signing 

physician.  Charla Guevara nurse practitioner acting as scribe for signing 

physician.





Objective





- Vital Signs


Vital signs: 


                                   Vital Signs











Temp  97.9 F   04/08/22 12:00


 


Pulse  85   04/08/22 12:00


 


Resp  12   04/08/22 12:00


 


BP  96/54   04/08/22 12:00


 


Pulse Ox  98   04/08/22 12:00








                                 Intake & Output











 04/07/22 04/08/22 04/08/22





 18:59 06:59 18:59


 


Intake Total 1110  


 


Output Total 45 350 350


 


Balance 1065 -350 -350


 


Weight 111 kg 111.9 kg 


 


Intake:   


 


    


 


    Sodium Chloride 0.9% 1, 20  





    000 ml @ 75 mls/hr IV .   





    [id number] [facility] Rx#:[id number]   


 


    Sodium Chloride 0.9% 1, 480  





    000 ml @ 80 mls/hr IV .   





    [id number] [facility] Rx#:[id number]   


 


  Oral 560  


 


Output:   


 


  Urine 45 350 350


 


  Stool 0  0


 


Other:   


 


  Voiding Method Incontinent Incontinent Incontinent





 External Catheter External Catheter External Catheter


 


  # Voids 1  1








                       ABP, PAP, CO, CI - Last Documented











Arterial Blood Pressure        107/66

















- Labs


CBC & Chem 7: 


                                 04/08/22 07:20





                                 04/08/22 07:20


Labs: 


                  Abnormal Lab Results - Last 24 Hours (Table)











  04/07/22 04/07/22 04/08/22 Range/Units





  11:57 11:57 07:20 


 


WBC  11.5 H   10.8 H  (3.8-10.6)  k/uL


 


RBC  2.53 L   2.57 L  (3.80-5.40)  m/uL


 


Hgb  7.5 L   7.7 L  (11.4-16.0)  gm/dL


 


Hct  24.2 L   24.9 L  (34.0-46.0)  %


 


RDW  16.5 H   18.1 H  (11.5-15.5)  %


 


Neutrophils #  9.2 H   8.7 H  (1.3-7.7)  k/uL


 


Lymphocytes #    0.9 L  (1.0-4.8)  k/uL


 


Sodium     (137-145)  mmol/L


 


BUN   21 H   (7-17)  mg/dL


 


Glucose   116 H   (74-99)  mg/dL


 


Calcium     (8.4-10.2)  mg/dL














  04/08/22 Range/Units





  07:20 


 


WBC   (3.8-10.6)  k/uL


 


RBC   (3.80-5.40)  m/uL


 


Hgb   (11.4-16.0)  gm/dL


 


Hct   (34.0-46.0)  %


 


RDW   (11.5-15.5)  %


 


Neutrophils #   (1.3-7.7)  k/uL


 


Lymphocytes #   (1.0-4.8)  k/uL


 


Sodium  136 L  (137-145)  mmol/L


 


BUN  19 H  (7-17)  mg/dL


 


Glucose  115 H  (74-99)  mg/dL


 


Calcium  8.2 L  (8.4-10.2)  mg/dL

## 2022-04-08 NOTE — P.PN
Subjective


Progress Note Date: 04/08/22





This is a 71-year-old female who was admitted to [facility] with 

symptoms of CHF and evidence of severe aortic stenosis and also moderate mitral 

regurgitation.  Patient also has moderate lung disease.  Patient was evaluated 

by cardiac catheterization and was found to have 99% stenosis of the ostium of 

the right coronary artery and moderate disease in the LAD.  Patient also has 

severe aortic stenosis with a peak gradient of 40 across the valve.  Given her 

LV dysfunction, that was felt to be very significant.  Echocardiogram done in 

this institution again showed severe aortic stenosis and evidence of severe 

cardiomyopathy with ejection fraction of 20-30%.  Attempted stent placement of 

the RCA, resulting in dissection.  The procedure was abandoned surgical consult 

was obtained.  It.  Patient was felt to be high risk candidate and a duration 

was made to attempt percutaneous intervention of the RCA and FFR of the LAD, 

which is being scheduled for tomorrow.  Patient apparently developed a hematoma 

in the right groin after 3 days of heparin.  Heparin was discontinued at was 

restarted again see is relatively stable.  Has chronic complaints of being 

fatigued and tired.  Lungs appeared to be clear.  Heart is regular.  Her urine 

output is fair.  We'll continue current medical therapy and await for 

intervention tomorrow.





04/05/2022: The patient's clinical status remains around the same.  Denies any 

chest pain or shortness of breath but seemed to be weak and fatigued.  She was n

oted to have further drop in hemoglobin and she was taken off the heparin.  A 

computed tomography scan of the abdomen and pelvis showed evidence of 

retroperitoneal hematoma and also of subcutaneous hematoma around groin site.  

The puncture radiologically looks appropriate and is the reason for this 

hematoma is not clear.  A vascular consult is pending.  She was scheduled to 

have intervention today but probably be postponed.  Cardiac surgery is also 

following.  Heart is regular.  She is maintaining sinus rhythm.  Lungs are 

clear.  Continue current medical therapy.





04/06/2022: Patient's remains relatively stable.  Her hemoglobin is stable 

without evidence of acute or active bleeding.  Groin is soft, denies any chest 

pain and doesn't appear to be in acute distress.  Her creatinine is normal.  

Discussed with patient and also daughter regarding further intervention.  Dr. Jacobs's willing to try again to do percutaneous intervention.  After that 

patient could have transcatheter aortic valve replacement.  Patient and daughter

are fully aware of the risks associated with the procedure and wanted to have 

this is done locally.  They were given the option of going to a tertiary center 

like [facility].  However, patient is reluctant and wants to have it 

done here as soon as possible.  Lungs are clear.  Heart is regular.  Height, 

came to Dr. Jacobs to proceed with intervention probably tomorrow.





04/08/2022: This patient had stent placement of the ostial RCA, yesterday.  Kiel

erated the procedure fairly well.  Patient also has moderate to severe LAD 

lesion with significant FFR.  However, it is recommended that patient have 

transcatheter aortic valve replacement.  This will be planned for the next one 

or 2 weeks.  Patient seemed to be feeling better.  Her hemoglobin is stable.  

Puncture site on the left side is feeling well without any hematoma.  Followed 

by vascular surgery and no intervention is recommended.  Patient is activities 

to be increased and if necessary.  Patient will be transferred to the telemetry 

unit.  Lungs are clear.  Heart is regular.  Further recommended lipid upon the 

clinical course





Objective





- Vital Signs


Vital signs: 


                                   Vital Signs











Temp  97.9 F   04/08/22 08:00


 


Pulse  92   04/08/22 10:00


 


Resp  11 L  04/08/22 10:00


 


BP  98/56   04/08/22 10:00


 


Pulse Ox  98   04/08/22 10:00








                                 Intake & Output











 04/07/22 04/08/22 04/08/22





 18:59 06:59 18:59


 


Intake Total 1110  


 


Output Total 45 350 150


 


Balance 1065 -350 -150


 


Weight 111 kg 111.9 kg 


 


Intake:   


 


    


 


    Sodium Chloride 0.9% 1, 20  





    000 ml @ 75 mls/hr IV .   





    [id number] [facility] Rx#:[id number]   


 


    Sodium Chloride 0.9% 1, 480  





    000 ml @ 80 mls/hr IV .   





    [id number] [facility] Rx#:[id number]   


 


  Oral 560  


 


Output:   


 


  Urine 45 350 150


 


  Stool 0  0


 


Other:   


 


  Voiding Method Incontinent Incontinent Incontinent





 External Catheter External Catheter External Catheter


 


  # Voids 1  1








                       ABP, PAP, CO, CI - Last Documented











Arterial Blood Pressure        107/66

















- Exam





GENERAL EXAM: Patient is alert and oriented and doesn't appear to be in any 

acute distress


HEENT: Normocephalic. Normal reaction of pupils, equal size, normal range of 

extraocular motion. No erythema or exudates in the throat.


NECK: No masses, no nuchal rigidity.


CHEST: No chest wall deformity.


LUNGS: Diminished breath sounds at bases


HEART: S1 and S2 normal .  Systolic murmur in the aortic area


ABDOMEN: No hepatosplenomegaly, normal bowel sounds, no guarding or rigidity.


SKIN: No rashes


CENTRAL NERVOUS SYSTEM: No focal deficits.


EXTREMITIES: No cyanosis, clubbing or edema.





- Labs


CBC & Chem 7: 


                                 04/08/22 07:20





                                 04/08/22 07:20


Labs: 


                  Abnormal Lab Results - Last 24 Hours (Table)











  04/07/22 04/07/22 04/08/22 Range/Units





  11:57 11:57 07:20 


 


WBC  11.5 H   10.8 H  (3.8-10.6)  k/uL


 


RBC  2.53 L   2.57 L  (3.80-5.40)  m/uL


 


Hgb  7.5 L   7.7 L  (11.4-16.0)  gm/dL


 


Hct  24.2 L   24.9 L  (34.0-46.0)  %


 


RDW  16.5 H   18.1 H  (11.5-15.5)  %


 


Neutrophils #  9.2 H   8.7 H  (1.3-7.7)  k/uL


 


Lymphocytes #    0.9 L  (1.0-4.8)  k/uL


 


Sodium     (137-145)  mmol/L


 


BUN   21 H   (7-17)  mg/dL


 


Glucose   116 H   (74-99)  mg/dL


 


Calcium     (8.4-10.2)  mg/dL














  04/08/22 Range/Units





  07:20 


 


WBC   (3.8-10.6)  k/uL


 


RBC   (3.80-5.40)  m/uL


 


Hgb   (11.4-16.0)  gm/dL


 


Hct   (34.0-46.0)  %


 


RDW   (11.5-15.5)  %


 


Neutrophils #   (1.3-7.7)  k/uL


 


Lymphocytes #   (1.0-4.8)  k/uL


 


Sodium  136 L  (137-145)  mmol/L


 


BUN  19 H  (7-17)  mg/dL


 


Glucose  115 H  (74-99)  mg/dL


 


Calcium  8.2 L  (8.4-10.2)  mg/dL














Assessment and Plan


(1) CAD (coronary artery disease)


Current Visit: Yes   Status: Acute   Code(s): I25.10 - ATHSCL HEART DISEASE OF 

NATIVE CORONARY ARTERY W/O ANG PCTRS   SNOMED Code(s): 73819219


   





(2) Severe aortic stenosis


Current Visit: Yes   Status: Acute   Code(s): I35.0 - NONRHEUMATIC AORTIC 

(VALVE) STENOSIS   SNOMED Code(s): 521640448


   





(3) Nonischemic cardiomyopathy


Current Visit: Yes   Status: Acute   Code(s): I42.8 - OTHER CARDIOMYOPATHIES   

SNOMED Code(s): 02986076


   





(4) Restrictive lung disease


Current Visit: Yes   Status: Acute   Code(s): J98.4 - OTHER DISORDERS OF LUNG   

SNOMED Code(s): 77752685


   


Plan: 


Patient's clinical status stable.  Feeling better.  Her hemoglobin is stable and

vital signs are stable.  Patient may be transferred to telemetry unit.  Planning

for transcatheter I recorder placement within next one to 2 weeks

## 2022-04-08 NOTE — P.PN
Subjective


Progress Note Date: 04/08/22











This is a pleasant 71-year-old female patient with biventricular failure, severe

aortic stenosis, mitral regurgitation and coronary artery disease.  The patient 

underwent a cardiac catheterization and the patient was found to have 99% RCA 

stenosis and the patient is post non-ST segment elevation myocardial infarction.

 The patient had an unsuccessful PCI of the RCA.  Initially, she was being 

contemplated for cardiac surgery knowing that she has valvular heart disease and

she was also being looks for a single-vessel bypass surgery.  Ultimately, it was

decided to do another cardiac catheterization tomorrow.  She is known to have 

dilated cardiomyopathy and impaired left ventricular ejection fraction at 

patient presented with acute CHF.  She is currently on O2 at 2 L per minute 

nasal cannula.  She is comfortable.  Nevertheless, she is anxious and she denies

having any chest pain.  Hemodynamically stable on no pressors.  She continues to

be guarded with Lasix and she is on 8040 mg IV every 12 hours.  She is off IV 

heparin as the patient developed an acute hematoma the right femoral artery/tamia

in area at the site of the cardiac catheterization.  The hematoma today is quite

soft and the patient has a hemoglobin of 8.6 which is essentially compatible to 

yesterday of 9.1.  There has been some mild drop in hemoglobin 9 of the 

hemoglobin on 04/03/2022 was 10.0.  Platelet counts are normal.  The hematoma in

the right groin is soft.  The plan is to proceed with another cardiac 

catheterization hopefully within the next 24 hours.  Of notice, isn't elevation 

in the creatinine which is up to 1.2 on today's evaluation.  The neck fluid 

balance over the past 24 hours has been in the order of +98 mL on this patient. 

Based on that, the Lasix has been placed on hold.  Meanwhile, the patient veena

nues to be on IV heparin infusion.  Note that the IV heparin was discontinued.  

Now that there is no active bleeding from the hematoma, cardiology gave orders 

to restart the IV heparin.





04/05/2020, the patient is being seen for a follow-up.  Overnight, the patient 

developed further drop in hemoglobin down to 6.9 as the patient was having some 

retroperitoneal bleeding.  IV heparin was discontinued.  The patient got 

transfused with a 1 units of packed RBC.  CAT scan of the chest abdomen and 

pelvis was done and it showed evidence of retroperitoneal bleed on the right 

displacing bladder.  There was also a small to moderate-sized right-sided 

pleural effusion and some pulmonary vascular congestion.  Based on that, IV 

heparin was discontinued and the patient was given a total of 1 units of packed 

RBC and hemoglobin today is at 7.6.  History of any chest pain.  She continues 

to have some shortness of breath at rest. Fluid balance has been +1.2 L over the

past 24 hours.  She is currently on 2 L about 2 by nasal cannula.  The plan is 

to undergo a cardiac catheterization and stenting of the RCA and cardiology is 

on the case.  Despite the drop in hemoglobin, and ongoing esophageal bleed, the 

patient's groin is soft and there is a soft area of ecchymosis along the right 

groin area.  Pulses in lower extremity is diminished at the present.





04/06/2022, condition is stable and unchanged compared to yesterday.  No signs 

of any ongoing bleeding and the patient's hemoglobin today is at 7.6 which is 

comparable to yesterday.  No change in the hematoma along the right groin area. 

No anticoagulants for now the patient is taken on Lovenox for prophylaxis.  The 

patient has no new complaints.  Resting comfortably in bed.  No coronary 

intervention was done and the cardiac interventional cardiology, decided to wait

a few more days until this is a pleasant hematoma is more settled prior doing a

ny intervention.  Meanwhile, the patient remains on 2 L of O2 by nasal cannula. 

Input output balance has been +230 mL over the past 24 hours.  BUN is a 36 with 

a creatinine of 0.6 and the potassium level of 4.6 and his sodium level of 134. 

White cell count is at 9.8 and the plated count is at 152.  No altered 

mentation.  The patient remains on aspirin.  The patient on Lovenox for DVT 

prophylaxis 40 mg subcu.  The patient remains on Coreg 3.125 mg by mouth twice a

day and digoxin and Aldactone.





04/07/2022, condition is stable.  No new complaints overnight.  The patient will

be taken to cardiac catheterization.  No acute chest pain or shortness of breath

overnight.  Most recent hemoglobin from yesterday was at 7.6 and the patient has

not had any further hemoglobins checked since yesterday.  Nevertheless, there is

no signs of any ongoing bleed and the groin bleed/hematoma remains stable.  The 

patient has no chest pain.  No shortness of breath.  The patient on no 

anticoagulants for now.  The plan will be a cardiac catheterization today.  The 

patient remains on Lovenox 40 mg subcu 40 to prophylaxis.  The patient on 

Aldactone.  The patient is on a combination of aspirin and Plavix.  Awaiting 

final recommendations from cardiology post cardiac catheterization.





04/08/2022, Trish is doing well.  She is currently on room air oxygen.  No 

new complaints.  No chest pain.  No respiratory difficulties.  She is post 

cardiac catheterization and stenting of the RCA.  The puncture site in the left 

groin area is dry clean and intact and the patient has no hematoma in the groins

bilaterally.  Meanwhile, the patient has a white cell count of 10.8 with 

hemoglobin of 7.7.  Sodium is at 136 with a BUN of 19 and a creatinine of 0.6.  

The patient is currently on aspirin, Plavix, and the patient is also on Coreg 

3.12 Mg twice a day and Aldactone.  Anticoagulants with Lovenox 40 mg prophyla

xis 40 mg subcu every 24 hours.  The patient is also on Lipitor 40 mg by mouth 

daily.  No other active issues for now.  The patient can be transferred out of 

the intensive care unit.  Tentative plan is to consider this patient for a TAVR 

procedure to later stage.





Objective





- Vital Signs


Vital signs: 


                                   Vital Signs











Temp  97.9 F   04/08/22 12:00


 


Pulse  85   04/08/22 12:00


 


Resp  12   04/08/22 12:00


 


BP  96/54   04/08/22 12:00


 


Pulse Ox  98   04/08/22 12:00








                                 Intake & Output











 04/07/22 04/08/22 04/08/22





 18:59 06:59 18:59


 


Intake Total 1110  


 


Output Total 45 350 350


 


Balance 1065 -350 -350


 


Weight 111 kg 111.9 kg 


 


Intake:   


 


    


 


    Sodium Chloride 0.9% 1, 20  





    000 ml @ 75 mls/hr IV .   





    [id number] SHANDA Rx#:[id number]   


 


    Sodium Chloride 0.9% 1, 480  





    000 ml @ 80 mls/hr IV .   





    [id number] SHANDA Rx#:[id number]   


 


  Oral 560  


 


Output:   


 


  Urine 45 350 350


 


  Stool 0  0


 


Other:   


 


  Voiding Method Incontinent Incontinent Incontinent





 External Catheter External Catheter External Catheter


 


  # Voids 1  1








                       ABP, PAP, CO, CI - Last Documented











Arterial Blood Pressure        107/66

















- Exam








CONSTITUTIONAL:  Tearful, cooperative and is in no acute distress.  The patient 

remains anxious.The patient is currently on room air oxygen


Head exam was generally normal. There was no scleral icterus or corneal arcus. 

Mucous membranes were moist.


Neck was supple and without jugular venous distension, thyromegaly, or carotid 

bruits. Carotids were easily palpable bilaterally. There was no adenopathy.


RESPIRATORY:  Lungs sounds essentially clear throughout, diminished bilateral 

bases, right greater than left.  Respirations are symmetrical and nonlabored.  

Currently on 2 L nasal cannula with oxygen saturation 96%.  Able to achieve 1500

mL on incentive spirometry.  Strong cough.  


CARDIOVASCULAR:  S1, S2 present, pansystolic murmur present 3/6.  Regular rate 

and rhythm, sinus rhythm on telemetry.  Doppler lower extremity pulses 

bilaterally.  No calf pain or tenderness noted. 


GASTROINTESTINAL:  Abdomen soft, nontender, nondistended.  Active bowel sounds 

present 4 quadrants.  Tolerating diet.  A soft hematoma is present over the 

right inguinal area which has not enlarged in size.


INTEGUMENTARY:  Skin is warm and dry with evidence of good perfusion.  


NEUROLOGIC:  Cranial nerves II through XII intact


MUSKULOSKELETAL:  Able to move all extremities, strength equal bilaterally.


PSYCHIATRIC:  Alert, oriented to person place and time, flat affect.











- Labs


CBC & Chem 7: 


                                 04/08/22 07:20





                                 04/08/22 07:20


Labs: 


                  Abnormal Lab Results - Last 24 Hours (Table)











  04/08/22 04/08/22 Range/Units





  07:20 07:20 


 


WBC  10.8 H   (3.8-10.6)  k/uL


 


RBC  2.57 L   (3.80-5.40)  m/uL


 


Hgb  7.7 L   (11.4-16.0)  gm/dL


 


Hct  24.9 L   (34.0-46.0)  %


 


RDW  18.1 H   (11.5-15.5)  %


 


Neutrophils #  8.7 H   (1.3-7.7)  k/uL


 


Lymphocytes #  0.9 L   (1.0-4.8)  k/uL


 


Sodium   136 L  (137-145)  mmol/L


 


BUN   19 H  (7-17)  mg/dL


 


Glucose   115 H  (74-99)  mg/dL


 


Calcium   8.2 L  (8.4-10.2)  mg/dL














Assessment and Plan


Plan: 











1 acute hypoxic respiratory failure, currently on RA oxygen by nasal cannula, 

essentially secondary to valvular heart disease/CAD and secondary decompensated 

heart failure.  Clinically stable 





2 acute non-ST segment elevation myocardial infarction.  The patient is post 

cardiac catheterization and the patient is a 99% RCA stenosis and the patient is

status post successful PCI to RCA.   





3 severe aortic valve stenosis with a peak and mean gradient of 78 and 47 mmHg, 

and the patient continues to have a harsh cardiac murmur systolic ejection on 

examination.





4 mitral regurgitation





5 dilated cardiomyopathy with impaired left ventricular ejection fraction 

systolic heart failure with an ejection fraction of 25-30%





6 bilateral pleural effusion secondary to above





7 hypertension





8 hyperlipidemia





9 COVID 19 pneumonia back in September 2021





10 retroperitoneal bleed with a development of a soft right groin hematoma at a 

set of cardiac catheterization,  the hemoglobin remains stable for now and there

is no interval worsening in the right groin hematoma





11 acute blood loss anemia with a hemoglobin of  6.9 and the patient got tra

nsfused with a total of 1 units of packed RBC and IV heparin was discontinued.  

The patient's hemoglobin is stable at 7.7 on today's evaluation





12 acute kidney injury improving and the creatinine is normalized





13 medical debility seconds above-mentioned comorbidities





14 right-sided pleural effusion secondary to CHF





Plan








post successful stenting of the RCA


Patient is currently on room air oxygen   


No signs of any acute respiratory distress at rest and the patient remains on 

room air oxygen.


Continue aspirin and statins and beta blockers in the form of Coreg 


Cardiothoracic surgeries on the case regarding possibility of bypass/aortic 

valve replacement versus PCI and possibly TaVR procedure regarding the aortic 

valve.   


Long-term prognosis poor baseline above-mentioned comorbidities.  


We'll continue to follow , The patient be transferred out of the intensive care 

unit.

## 2022-04-08 NOTE — P.PN
Subjective


Progress Note Date: 04/08/22





This is a 71-year-old female who was seen as a follow-up and she remains in the 

ICU.  Vascular surgery was consulted for a retroperitoneal hematoma that was not

actively bleeding.  Yesterday she underwent cardiac catheterization with 

findings of 99% RCA stenosis, 60-70% proximal LAD stenosis and circumflex 30-40%

stenosis status post PCI of the proximal to RCA access from the left femoral 

artery.  She is denying any active bleeding.  She denies abdominal pain, chest 

pain or shortness of breath, just states that she is tired.  Hemoglobin remains 

stable at 7.7.





Objective





- Vital Signs


Vital signs: 


                                   Vital Signs











Temp  97.9 F   04/07/22 15:00


 


Pulse  80   04/08/22 08:11


 


Resp  16   04/08/22 08:11


 


BP  101/58   04/08/22 06:00


 


Pulse Ox  96   04/08/22 07:57








                                 Intake & Output











 04/07/22 04/08/22 04/08/22





 18:59 06:59 18:59


 


Intake Total 1110  


 


Output Total 45 350 150


 


Balance 1065 -350 -150


 


Weight 111 kg 111.9 kg 


 


Intake:   


 


    


 


    Sodium Chloride 0.9% 1, 20  





    000 ml @ 75 mls/hr IV .   





    [id number] SHANDA Rx#:[id number]   


 


    Sodium Chloride 0.9% 1, 480  





    000 ml @ 80 mls/hr IV .   





    [id number] SHANDA Rx#:[id number]   


 


  Oral 560  


 


Output:   


 


  Urine 45 350 150


 


  Stool 0  


 


Other:   


 


  Voiding Method Incontinent Incontinent 





 External Catheter External Catheter 


 


  # Voids 1  








                       ABP, PAP, CO, CI - Last Documented











Arterial Blood Pressure        107/66

















- Exam





General appearance: The patient is alert, oriented, appears in no acute 

distress.


HET: Head is normocephalic and atraumatic.  Pupils are equal and reactive.  


Neck: Supple without lymphadenopathy.  Trachea midline.  No audible carotid 

bruit.


Heart: S1 S2.  Systolic murmur noted.  Regular rate and rhythm.


Lungs: Clear to auscultation bilaterally.


Abdomen: Soft, nontender, nondistended.  Ecchymosis right lower abdomen.


Extremities: Normal skin color and turgor.  Right groin with ecchymosis, soft 

hematoma noted.  Left groin access site without any active bleeding, no hematoma

noted, no bruising noted.  Minimal tenderness.  Bilateral lower extremity edema.


Neurological: No focal deficits.  Strength and sensation are grossly intact.








- Labs


CBC & Chem 7: 


                                 04/08/22 07:20





                                 04/08/22 07:20


Labs: 


                  Abnormal Lab Results - Last 24 Hours (Table)











  04/07/22 04/07/22 04/08/22 Range/Units





  11:57 11:57 07:20 


 


WBC  11.5 H   10.8 H  (3.8-10.6)  k/uL


 


RBC  2.53 L   2.57 L  (3.80-5.40)  m/uL


 


Hgb  7.5 L   7.7 L  (11.4-16.0)  gm/dL


 


Hct  24.2 L   24.9 L  (34.0-46.0)  %


 


RDW  16.5 H   18.1 H  (11.5-15.5)  %


 


Neutrophils #  9.2 H   8.7 H  (1.3-7.7)  k/uL


 


Lymphocytes #    0.9 L  (1.0-4.8)  k/uL


 


Sodium     (137-145)  mmol/L


 


BUN   21 H   (7-17)  mg/dL


 


Glucose   116 H   (74-99)  mg/dL


 


Calcium     (8.4-10.2)  mg/dL














  04/08/22 Range/Units





  07:20 


 


WBC   (3.8-10.6)  k/uL


 


RBC   (3.80-5.40)  m/uL


 


Hgb   (11.4-16.0)  gm/dL


 


Hct   (34.0-46.0)  %


 


RDW   (11.5-15.5)  %


 


Neutrophils #   (1.3-7.7)  k/uL


 


Lymphocytes #   (1.0-4.8)  k/uL


 


Sodium  136 L  (137-145)  mmol/L


 


BUN  19 H  (7-17)  mg/dL


 


Glucose  115 H  (74-99)  mg/dL


 


Calcium  8.2 L  (8.4-10.2)  mg/dL














Assessment and Plan


Assessment: 





1.  Retroperitoneal hematoma status post cardiac catheterization


2.  Coronary artery disease with 99% RCA stenosis, 60-70% proximal LAD stenosis 

and circumflex 30-40% stenosis status post PCI proximal to RCA


3.  Acute hypoxic respiratory failure, currently on 2 L of oxygen by nasal 

cannula, essentially secondary to valvular heart disease/CAD and secondary 

decompensated heart failure.


4.  Acute non-ST segment elevation myocardial infarction.  The patient is post 

cardiac catheterization and the patient is a 99% RCA stenosis and the patient is

status post unsuccessful PCI to RCA


5.  Severe aortic valve stenosis 


6.  Mitral regurgitation


7.  Dilated cardiomyopathy with impaired left ventricular ejection fraction 

systolic heart failure with an ejection fraction of 25-30%


8.  Bilateral pleural effusion secondary to above


9.  Hypertension


10.  Hyperlipidemia


11.  COVID 19 pneumonia back in September 2021


12.  Right groin hematoma status post cardiac catheterization


13.  Acute blood loss anemia post cardiac catheterization, right groin hematoma,

retroperitoneal hematoma


14.  Acute kidney injury 





Plan: 





1.  Continue symptomatic and supportive care


2.  CT angiogram chest abdomen and pelvis reviewed, no evidence of bleeding 

hematoma


3.  Continue diet as tolerated


4.  There is no indication for any vascular surgical intervention


5.  May resume anticoagulation if needed


6.  Monitor for signs of bleeding closely


7.  Monitor H&H closely


Thank you for this consultation, we will sign off at this time.  Please do not 

hesitate to call us back if further needed.








The impression and plan of care has been dictated as directed.





Dr. Lagunas


I performed a history and examination of this patient,  discussed the same with 

the dictator.  I agree with the dictator's note ,documented as a scribe.  Any 

additional findings or plans will be noted.

## 2022-04-09 LAB
ERYTHROCYTE [DISTWIDTH] IN BLOOD BY AUTOMATED COUNT: 2.53 M/UL (ref 3.8–5.4)
ERYTHROCYTE [DISTWIDTH] IN BLOOD: 18.5 % (ref 11.5–15.5)
GLUCOSE BLD-MCNC: 149 MG/DL (ref 75–99)
HCT VFR BLD AUTO: 24.6 % (ref 34–46)
HGB BLD-MCNC: 7.7 GM/DL (ref 11.4–16)
MCH RBC QN AUTO: 30.5 PG (ref 25–35)
MCHC RBC AUTO-ENTMCNC: 31.3 G/DL (ref 31–37)
MCV RBC AUTO: 97.5 FL (ref 80–100)
PLATELET # BLD AUTO: 205 K/UL (ref 150–450)
WBC # BLD AUTO: 12.4 K/UL (ref 3.8–10.6)

## 2022-04-09 RX ADMIN — SERTRALINE HYDROCHLORIDE SCH MG: 25 TABLET ORAL at 20:17

## 2022-04-09 RX ADMIN — BUDESONIDE AND FORMOTEROL FUMARATE DIHYDRATE SCH: 160; 4.5 AEROSOL RESPIRATORY (INHALATION) at 08:47

## 2022-04-09 RX ADMIN — IPRATROPIUM BROMIDE AND ALBUTEROL SULFATE SCH: .5; 3 SOLUTION RESPIRATORY (INHALATION) at 08:47

## 2022-04-09 RX ADMIN — SPIRONOLACTONE SCH MG: 25 TABLET, FILM COATED ORAL at 08:10

## 2022-04-09 RX ADMIN — CLOPIDOGREL BISULFATE SCH MG: 75 TABLET ORAL at 08:10

## 2022-04-09 RX ADMIN — DIGOXIN SCH MCG: 125 TABLET ORAL at 08:11

## 2022-04-09 RX ADMIN — LACTULOSE SCH GM: 20 SOLUTION ORAL at 20:16

## 2022-04-09 RX ADMIN — ASPIRIN 81 MG CHEWABLE TABLET SCH MG: 81 TABLET CHEWABLE at 08:11

## 2022-04-09 RX ADMIN — DOCUSATE SODIUM AND SENNOSIDES SCH EACH: 50; 8.6 TABLET ORAL at 20:16

## 2022-04-09 RX ADMIN — Medication SCH MG: at 06:32

## 2022-04-09 RX ADMIN — IPRATROPIUM BROMIDE AND ALBUTEROL SULFATE SCH: .5; 3 SOLUTION RESPIRATORY (INHALATION) at 11:49

## 2022-04-09 RX ADMIN — Medication SCH MG: at 16:54

## 2022-04-09 RX ADMIN — IPRATROPIUM BROMIDE AND ALBUTEROL SULFATE SCH: .5; 3 SOLUTION RESPIRATORY (INHALATION) at 15:37

## 2022-04-09 RX ADMIN — PANTOPRAZOLE SODIUM SCH MG: 40 INJECTION, POWDER, FOR SOLUTION INTRAVENOUS at 08:11

## 2022-04-09 RX ADMIN — BUDESONIDE AND FORMOTEROL FUMARATE DIHYDRATE SCH: 160; 4.5 AEROSOL RESPIRATORY (INHALATION) at 20:18

## 2022-04-09 RX ADMIN — ENOXAPARIN SODIUM SCH MG: 40 INJECTION SUBCUTANEOUS at 08:11

## 2022-04-09 RX ADMIN — SERTRALINE HYDROCHLORIDE SCH MG: 100 TABLET ORAL at 20:17

## 2022-04-09 RX ADMIN — OXYCODONE HYDROCHLORIDE AND ACETAMINOPHEN SCH MG: 500 TABLET ORAL at 06:32

## 2022-04-09 RX ADMIN — LORATADINE SCH MG: 10 TABLET ORAL at 08:11

## 2022-04-09 RX ADMIN — OXYCODONE HYDROCHLORIDE AND ACETAMINOPHEN SCH MG: 500 TABLET ORAL at 16:54

## 2022-04-09 RX ADMIN — ACETAMINOPHEN PRN MG: 325 TABLET, FILM COATED ORAL at 20:16

## 2022-04-09 RX ADMIN — IPRATROPIUM BROMIDE AND ALBUTEROL SULFATE SCH: .5; 3 SOLUTION RESPIRATORY (INHALATION) at 20:18

## 2022-04-09 RX ADMIN — ATORVASTATIN CALCIUM SCH MG: 40 TABLET, FILM COATED ORAL at 20:17

## 2022-04-09 NOTE — P.PN
Subjective


Progress Note Date: 04/09/22











This is a pleasant 71-year-old female patient with biventricular failure, severe

aortic stenosis, mitral regurgitation and coronary artery disease.  The patient 

underwent a cardiac catheterization and the patient was found to have 99% RCA 

stenosis and the patient is post non-ST segment elevation myocardial infarction.

 The patient had an unsuccessful PCI of the RCA.  Initially, she was being 

contemplated for cardiac surgery knowing that she has valvular heart disease and

she was also being looks for a single-vessel bypass surgery.  Ultimately, it was

decided to do another cardiac catheterization tomorrow.  She is known to have 

dilated cardiomyopathy and impaired left ventricular ejection fraction at 

patient presented with acute CHF.  She is currently on O2 at 2 L per minute 

nasal cannula.  She is comfortable.  Nevertheless, she is anxious and she denies

having any chest pain.  Hemodynamically stable on no pressors.  She continues to

be guarded with Lasix and she is on 8040 mg IV every 12 hours.  She is off IV 

heparin as the patient developed an acute hematoma the right femoral artery/tamia

in area at the site of the cardiac catheterization.  The hematoma today is quite

soft and the patient has a hemoglobin of 8.6 which is essentially compatible to 

yesterday of 9.1.  There has been some mild drop in hemoglobin 9 of the 

hemoglobin on 04/03/2022 was 10.0.  Platelet counts are normal.  The hematoma in

the right groin is soft.  The plan is to proceed with another cardiac 

catheterization hopefully within the next 24 hours.  Of notice, isn't elevation 

in the creatinine which is up to 1.2 on today's evaluation.  The neck fluid 

balance over the past 24 hours has been in the order of +98 mL on this patient. 

Based on that, the Lasix has been placed on hold.  Meanwhile, the patient veena

nues to be on IV heparin infusion.  Note that the IV heparin was discontinued.  

Now that there is no active bleeding from the hematoma, cardiology gave orders 

to restart the IV heparin.





04/05/2020, the patient is being seen for a follow-up.  Overnight, the patient 

developed further drop in hemoglobin down to 6.9 as the patient was having some 

retroperitoneal bleeding.  IV heparin was discontinued.  The patient got 

transfused with a 1 units of packed RBC.  CAT scan of the chest abdomen and 

pelvis was done and it showed evidence of retroperitoneal bleed on the right 

displacing bladder.  There was also a small to moderate-sized right-sided 

pleural effusion and some pulmonary vascular congestion.  Based on that, IV 

heparin was discontinued and the patient was given a total of 1 units of packed 

RBC and hemoglobin today is at 7.6.  History of any chest pain.  She continues 

to have some shortness of breath at rest. Fluid balance has been +1.2 L over the

past 24 hours.  She is currently on 2 L about 2 by nasal cannula.  The plan is 

to undergo a cardiac catheterization and stenting of the RCA and cardiology is 

on the case.  Despite the drop in hemoglobin, and ongoing esophageal bleed, the 

patient's groin is soft and there is a soft area of ecchymosis along the right 

groin area.  Pulses in lower extremity is diminished at the present.





04/06/2022, condition is stable and unchanged compared to yesterday.  No signs 

of any ongoing bleeding and the patient's hemoglobin today is at 7.6 which is 

comparable to yesterday.  No change in the hematoma along the right groin area. 

No anticoagulants for now the patient is taken on Lovenox for prophylaxis.  The 

patient has no new complaints.  Resting comfortably in bed.  No coronary 

intervention was done and the cardiac interventional cardiology, decided to wait

a few more days until this is a pleasant hematoma is more settled prior doing a

ny intervention.  Meanwhile, the patient remains on 2 L of O2 by nasal cannula. 

Input output balance has been +230 mL over the past 24 hours.  BUN is a 36 with 

a creatinine of 0.6 and the potassium level of 4.6 and his sodium level of 134. 

White cell count is at 9.8 and the plated count is at 152.  No altered 

mentation.  The patient remains on aspirin.  The patient on Lovenox for DVT 

prophylaxis 40 mg subcu.  The patient remains on Coreg 3.125 mg by mouth twice a

day and digoxin and Aldactone.





04/07/2022, condition is stable.  No new complaints overnight.  The patient will

be taken to cardiac catheterization.  No acute chest pain or shortness of breath

overnight.  Most recent hemoglobin from yesterday was at 7.6 and the patient has

not had any further hemoglobins checked since yesterday.  Nevertheless, there is

no signs of any ongoing bleed and the groin bleed/hematoma remains stable.  The 

patient has no chest pain.  No shortness of breath.  The patient on no 

anticoagulants for now.  The plan will be a cardiac catheterization today.  The 

patient remains on Lovenox 40 mg subcu 40 to prophylaxis.  The patient on 

Aldactone.  The patient is on a combination of aspirin and Plavix.  Awaiting 

final recommendations from cardiology post cardiac catheterization.





04/08/2022, Trish is doing well.  She is currently on room air oxygen.  No 

new complaints.  No chest pain.  No respiratory difficulties.  She is post 

cardiac catheterization and stenting of the RCA.  The puncture site in the left 

groin area is dry clean and intact and the patient has no hematoma in the groins

bilaterally.  Meanwhile, the patient has a white cell count of 10.8 with 

hemoglobin of 7.7.  Sodium is at 136 with a BUN of 19 and a creatinine of 0.6.  

The patient is currently on aspirin, Plavix, and the patient is also on Coreg 

3.12 Mg twice a day and Aldactone.  Anticoagulants with Lovenox 40 mg prophyla

xis 40 mg subcu every 24 hours.  The patient is also on Lipitor 40 mg by mouth 

daily.  No other active issues for now.  The patient can be transferred out of 

the intensive care unit.  Tentative plan is to consider this patient for a TAVR 

procedure to later stage.








04/09/2022, the patient is stable to no new complaints.  The patient got chested

out of the intensive care unit yesterday.  No chest pain.  No shortness of 

breath.  She remains on Coreg 3.125 mg twice a day and the patient is also on 

digoxin 125 g on a daily basis and the patient remains on a potassium sparing 

diuretics which is Aldactone 25 mg on a daily basis.  She is currently on 2 L 

about 2 by nasal cannula pH is on Lovenox 40 the prophylaxis.  No new blood work

from today.  Hemoglobin from yesterday was at 7.7.  The patient is resting 

comfortably in bed.





Objective





- Vital Signs


Vital signs: 


                                   Vital Signs











Temp  98 F   04/09/22 12:00


 


Pulse  68   04/09/22 12:00


 


Resp  16   04/09/22 12:00


 


BP  91/53   04/09/22 12:00


 


Pulse Ox  98   04/09/22 12:00








                                 Intake & Output











 04/08/22 04/09/22 04/09/22





 18:59 06:59 18:59


 


Intake Total 120 50 


 


Output Total 500 225 


 


Balance -380 -175 


 


Intake:   


 


  Oral 120 50 


 


Output:   


 


  Urine 500 225 


 


  Stool 0 0 


 


Other:   


 


  Voiding Method Incontinent Incontinent Incontinent





 External Catheter External Catheter External Catheter


 


  # Voids 1  








                       ABP, PAP, CO, CI - Last Documented











Arterial Blood Pressure        107/66

















- Exam








CONSTITUTIONAL:  Tearful, cooperative and is in no acute distress.  The patient 

remains anxious.The patient is currently on room air oxygen


Head exam was generally normal. There was no scleral icterus or corneal arcus. 

Mucous membranes were moist.


Neck was supple and without jugular venous distension, thyromegaly, or carotid 

bruits. Carotids were easily palpable bilaterally. There was no adenopathy.


RESPIRATORY:  Lungs sounds essentially clear throughout, diminished bilateral 

bases, right greater than left.  Respirations are symmetrical and nonlabored.  

Currently on 2 L nasal cannula with oxygen saturation 96%.  Able to achieve 1500

mL on incentive spirometry.  Strong cough.  


CARDIOVASCULAR:  S1, S2 present, pansystolic murmur present 3/6.  Regular rate 

and rhythm, sinus rhythm on telemetry.  Doppler lower extremity pulses 

bilaterally.  No calf pain or tenderness noted. 


GASTROINTESTINAL:  Abdomen soft, nontender, nondistended.  Active bowel sounds 

present 4 quadrants.  Tolerating diet.  A soft hematoma is present over the 

right inguinal area which has not enlarged in size.


INTEGUMENTARY:  Skin is warm and dry with evidence of good perfusion.  


NEUROLOGIC:  Cranial nerves II through XII intact


MUSKULOSKELETAL:  Able to move all extremities, strength equal bilaterally.


PSYCHIATRIC:  Alert, oriented to person place and time, flat affect.











- Labs


CBC & Chem 7: 


                                 04/08/22 07:20





                                 04/08/22 07:20


Labs: 


                  Abnormal Lab Results - Last 24 Hours (Table)











  04/09/22 Range/Units





  06:43 


 


POC Glucose (mg/dL)  149 H  (75-99)  mg/dL














Assessment and Plan


Plan: 











1 acute hypoxic respiratory failure, currently on RA oxygen by nasal cannula, 

essentially secondary to valvular heart disease/CAD and secondary decompensated 

heart failure.  Clinically stable 





2 acute non-ST segment elevation myocardial infarction.  The patient is post 

cardiac catheterization and the patient is a 99% RCA stenosis and the patient is

status post successful PCI to RCA.   





3 severe aortic valve stenosis with a peak and mean gradient of 78 and 47 mmHg, 

and the patient continues to have a harsh cardiac murmur systolic ejection on 

examination.  Condition is stable and the patient remains on Aldactone





4 mitral regurgitation





5 dilated cardiomyopathy with impaired left ventricular ejection fraction 

systolic heart failure with an ejection fraction of 25-30%





6 bilateral pleural effusion secondary to above





7 hypertension





8 hyperlipidemia





9 COVID 19 pneumonia back in September 2021





10 retroperitoneal bleed with a development of a soft right groin hematoma at a 

set of cardiac catheterization,  the hemoglobin remains stable for now and there

is no interval worsening in the right groin hematoma





11 acute blood loss anemia with a hemoglobin of  6.9 and the patient got 

transfused with a total of 1 units of packed RBC and IV heparin was disconti

nued.  In the hemoglobin has remained stable





12 acute kidney injury improving and the creatinine is normalized





13 medical debility seconds above-mentioned comorbidities





14 right-sided pleural effusion secondary to CHF





Plan








post successful stenting of the RCA and the patient's history of any chest pain


The patient got transferred out of the intensive care unit yesterday 


Continue aspirin and statins and beta blockers in the form of Coreg 


Cardiothoracic surgeries on the case regarding possibility of bypass/aortic 

valve replacement versus PCI and possibly TaVR procedure regarding the aortic 

valve.   


Long-term prognosis poor baseline above-mentioned comorbidities.  


We'll continue to follow

## 2022-04-09 NOTE — P.PN
Subjective


Progress Note Date: 04/09/22





This is a 71-year-old female who was admitted to [facility] with 

symptoms of CHF and evidence of severe aortic stenosis and also moderate mitral 

regurgitation.  Patient also has moderate lung disease.  Patient was evaluated 

by cardiac catheterization and was found to have 99% stenosis of the ostium of 

the right coronary artery and moderate disease in the LAD.  Patient also has 

severe aortic stenosis with a peak gradient of 40 across the valve.  Given her 

LV dysfunction, that was felt to be very significant.  Echocardiogram done in 

this institution again showed severe aortic stenosis and evidence of severe 

cardiomyopathy with ejection fraction of 20-30%.  Attempted stent placement of 

the RCA, resulting in dissection.  The procedure was abandoned surgical consult 

was obtained.  It.  Patient was felt to be high risk candidate and a duration 

was made to attempt percutaneous intervention of the RCA and FFR of the LAD, 

which is being scheduled for tomorrow.  Patient apparently developed a hematoma 

in the right groin after 3 days of heparin.  Heparin was discontinued at was 

restarted again see is relatively stable.  Has chronic complaints of being 

fatigued and tired.  Lungs appeared to be clear.  Heart is regular.  Her urine 

output is fair.  We'll continue current medical therapy and await for 

intervention tomorrow.





04/05/2022: The patient's clinical status remains around the same.  Denies any 

chest pain or shortness of breath but seemed to be weak and fatigued.  She was n

oted to have further drop in hemoglobin and she was taken off the heparin.  A 

computed tomography scan of the abdomen and pelvis showed evidence of 

retroperitoneal hematoma and also of subcutaneous hematoma around groin site.  

The puncture radiologically looks appropriate and is the reason for this 

hematoma is not clear.  A vascular consult is pending.  She was scheduled to 

have intervention today but probably be postponed.  Cardiac surgery is also 

following.  Heart is regular.  She is maintaining sinus rhythm.  Lungs are 

clear.  Continue current medical therapy.





04/06/2022: Patient's remains relatively stable.  Her hemoglobin is stable 

without evidence of acute or active bleeding.  Groin is soft, denies any chest 

pain and doesn't appear to be in acute distress.  Her creatinine is normal.  

Discussed with patient and also daughter regarding further intervention.  Dr. Jacobs's willing to try again to do percutaneous intervention.  After that 

patient could have transcatheter aortic valve replacement.  Patient and daughter

are fully aware of the risks associated with the procedure and wanted to have 

this is done locally.  They were given the option of going to a tertiary center 

like [facility].  However, patient is reluctant and wants to have it 

done here as soon as possible.  Lungs are clear.  Heart is regular.  Height, 

came to Dr. Jacobs to proceed with intervention probably tomorrow.





04/08/2022: This patient had stent placement of the ostial RCA, yesterday.  Kiel

erated the procedure fairly well.  Patient also has moderate to severe LAD 

lesion with significant FFR.  However, it is recommended that patient have 

transcatheter aortic valve replacement.  This will be planned for the next one 

or 2 weeks.  Patient seemed to be feeling better.  Her hemoglobin is stable.  

Puncture site on the left side is feeling well without any hematoma.  Followed 

by vascular surgery and no intervention is recommended.  Patient is activities 

to be increased and if necessary.  Patient will be transferred to the telemetry 

unit.  Lungs are clear.  Heart is regular.  Further recommended lipid upon the 

clinical course





04/09/2022: Patient states that she has been up and ambulated in her room for a 

little bit.  She denies any chest pain, lightheadedness or dizziness at this ti

me.  Blood pressure is on the soft side and 91/53.  Cardiac monitor has been a 

sinus rhythm in the 60s to 80s.  Repeat blood work reveals hemoglobin of 7.7.  

WBC 12.4.  Discussed with patient the need for subacute rehab per therapy notes 

and she became quite upset but will need social work to discuss discharge 

planning with her.








- Exam


GENERAL EXAM: Patient is alert and oriented and doesn't appear to be in any 

acute distress


HEENT: Normocephalic. Normal reaction of pupils, equal size, normal range of 

extraocular motion.


NECK: No masses, no nuchal rigidity.


CHEST: No chest wall deformity.


LUNGS: Diminished breath sounds at bases


HEART: S1 and S2 normal .  Systolic murmur in the aortic area


ABDOMEN: No hepatosplenomegaly, normal bowel sounds, no guarding or rigidity.


SKIN: No rashes


CENTRAL NERVOUS SYSTEM: No focal deficits.


EXTREMITIES: No cyanosis, clubbing or edema.





Assessment 


Coronary artery disease


Severe aortic stenosis


Nonischemic cardiomyopathy


Restrictive lung disease











Plan


Increase activity


Follow-up for TAVR procedure


Continue current cardiac medications


Patient may be discharged to return later time for procedure





Nurse practitioner note has been reviewed, I agree with documented findings and 

plan of care.  Patient was seen and examined.








Objective





- Vital Signs


Vital signs: 


                                   Vital Signs











Temp  98.2 F   04/09/22 08:00


 


Pulse  87   04/09/22 08:00


 


Resp  17   04/09/22 08:00


 


BP  94/63   04/09/22 08:00


 


Pulse Ox  100   04/09/22 08:00








                                 Intake & Output











 04/08/22 04/09/22 04/09/22





 18:59 06:59 18:59


 


Intake Total 120 50 


 


Output Total 500 225 


 


Balance -380 -175 


 


Intake:   


 


  Oral 120 50 


 


Output:   


 


  Urine 500 225 


 


  Stool 0 0 


 


Other:   


 


  Voiding Method Incontinent Incontinent Incontinent





 External Catheter External Catheter External Catheter


 


  # Voids 1  








                       ABP, PAP, CO, CI - Last Documented











Arterial Blood Pressure        107/66

















- Labs


CBC & Chem 7: 


                                 04/09/22 12:44





                                 04/08/22 07:20


Labs: 


                  Abnormal Lab Results - Last 24 Hours (Table)











  04/09/22 Range/Units





  06:43 


 


POC Glucose (mg/dL)  149 H  (75-99)  mg/dL

## 2022-04-09 NOTE — P.PN
Subjective


Progress Note Date: 04/09/22





HISTORY OF PRESENT ILLNESS


This is a 71-year-old female with past medical history of dilated cardiomyopathy

with ejection fraction of 15-20%, severe aortic valve stenosis, mild aortic 

regurgitation with moderate to severe mitral regurgitation, chronic systolic 

heart failure, hyperlipidemia, obesity with possible obstructive sleep apnea and

obesity hypoventilation syndrome, chronic hypoxic respiratory failure on home O2

at 2 L nasal cannula, hypertension hypertensive cardio vascular disease, 

previous Covid 19 infection.  Patient presented to [facility] 

slight elevation of troponin, EKG showed changes suggestive ischemic changes and

was started on aspirin, heparin drip and admitted to the hospital.  Patient was 

seen by cardiology. She was supposed to have a heart catheterization done as an 

outpatient along with [facility] for possible aortic valve replacement and mitral valve

and possible CABG.  Patient underwent coronary angiography finding right 

dominant vessel, 99% ostial stenosis, aortic pressure is 100/70, 40 mm gradient 

across the aortic valve.  Left ventricular end diastolic pressure is 2530.  

Patient was transferred to [facility] for PTCA and stent 

placement of the RCA which was unsuccessful.  Consult with cardiothoracic 

surgery for CABG and valve repair/replacement.





4/1: Patient is seen today in the intensive care unit.  She denies chest pain or

shortness of breath. She complains of anxiety and decreased appetite. Less cough

today.She has been seen by intensivist and cardiothoracic surgery, currently on 

heparin drip, consult was added for dental evaluation and clearance and 

panoramic CT.  Patient has been afebrile, heart rate in the 80s, blood pressure 

101/73, pulse ox 96%.  Repeat blood work reveals CBC is unremarkable.  Potassium

4.0.  Urinalysis negative for infection.  Hepatitis panel negative.  MRSA nasal 

screen is in process.  CT of the chest revealed cardiomegaly with suspected 

pulmonary edema and moderate to marked right sided pleural effusion.  Associated

infection can't be excluded.


Echocardiogram reveals EF of 25-30% with mild concentric left ventricular 

hypertrophy, severe global hypokinesia of the LV, severe aortic stenosis with 

gradient 78.27 mm a new 3/47.94 mmHg, trace mitral regurgitation, mild mitral 

stenosis, mild tricuspid regurgitation, mild pulmonary hypertension, RVSP 43.33 

mmHg.


Carotid ultrasound revealed less than 50% stenosis bilateral carotid systems.





4/2: Patient is sitting up in bed she underwent CTA of the chest for evaluation 

of dissection of the right coronary artery, she denies any chest pain at this 

time, she has no shortness breath, she has no abdominal pain, nausea or 

vomiting, she continues to have some coughing noted from production, she 

continues to be somewhat depressed and anxious and she is not sure what to do, 

she is missing her daughter she stated and she doesn't think her daughter is 

able to come and see her.





4/3: Patient is sitting on the bed in no apparent distress, she continues to be 

quite depressed and anxious about her situation, she has not made up her mind, I

had a long conversation with Dr. Jacobs regarding the plan to pursue the [facility] care at this time, she was deemed high surgical risk per cardiovascular 

surgery service at the current [place], and she would benefit from a 

transcatheter aortic valve replacement plus reattempted PCI of the RCA, her CT 

angiography of the chest did not show evidence of any dissection of the thoracic

aorta , patient is maintained on Zoloft 100 mg every day, she is mentioned in 

ICU, and the plan to the PCI hopefully in the next 24 hours patient and her 

daughter Janneth are in agreement for conservative approach and they understand the

risks of the procedure and that she will have the TAVR at a later date or during

this hospital admission depends on her symptoms, we will consult Psychiatry for 

depression and possible bipolar disorder.





4/4: Patient remains in intensive care unit, afebrile, heart rate in the 80s, 

blood pressure 101/58, pulse ox 97% on room air.  Patient denies having any 

chest pain but continues to have anxiety issues.  Consult with psychiatry is 

pending.  Patient has been scheduled for CT TAVR protocol for today.  Repeat 

blood work reveals WBC 15, hemoglobin 8.6, platelet count 172.  Sodium 134, BUN 

41 creatinine 1.2.  Blood sugar 149.  Total bilirubin 1.4 otherwise liver 

function tests are normal.  Nasal MRSA screen is negative.





4/5: Patient remains in the intensive care unit.  Patient underwent CAT scan 

yesterday for TAVR protoxol and was found to have moderate size right pelvic 

retroperitoneal hematoma extending into the lower abdomen presumed from att

empted right groin catheter insertion.  Consult was added for vascular surgery 

with plan to continue supportive care, no plan for surgical intervention.  

Heparin was discontinued.  Patient was transfused 1 unit of packed RBCs for 

hemoglobin of 6.9 with repeat hemoglobin of 7.6.


Patient has been afebrile, heart rate 70, blood pressure 109/62, pulse ox 97%.  

Cardiac monitor is sinus rhythm.  Patient has also been seen and followed by 

psychiatry for depression and acute bereavement with the loss of her , 

recommendations Zoloft 125 mg daily at bedtime.





4/6: Patient remains in the intensive care unit.  No signs of bleeding and no 

worsening hematoma with hemoglobin stable at 7.6. Vascular surgery has cleared 

patient to resume heparin if needed, continue to monitor for bleeding and daily 

CBC.  Plan is for Dr. Jacobs will proceed with intervention tomorrow.  Patient

is also followed by intensivist, cardiothoracic surgery and psychiatry.





4/7: Patient is status post stent in the RCA today with Dr. Jacobs.  She is 

seen postprocedure and is comfortable.  She denies having any chest pain.  

Anxiety is controlled.  Approach was from the left groin, no bleeding or 

hematoma noted.  Patient remains afebrile, heart rate in the 80s, blood pressure

92/57, pulse ox 94% on 2 L nasal cannula.  Repeat hemoglobin is stable at 7.5, 

WBC 11.5.  Electrolytes are normal, BUN 21 creatinine 0.61.





4/8: Patient remains in the intensive care unit status post stent of the RCA 

yesterday.  Patient has moderate to severe LAD lesion with significant FFR to be

addressed at a later time as well asTAVR will be planned for the next 1-2 weeks.

 No hematoma to the left groin.  Patient is waiting for a bed on the cardiac 

stepdown unit. Vascular surgeries following for retroperitoneal hematoma that 

does not appear to be actively bleeding, plan for supportive care.  Patient 

remains afebrile heart rate 85, blood pressure 96/54, pulse ox 98% on room air..

 BUN 19 and creatinine 0.62.  Anticipate that patient will be able to work with 

PT and OT determine home care versus subacute rehab needs.





4/9: Patient was moved out of the ICU to a telemetry unit, she is in a better 

spurs, she responded very well to the sertraline 125 mg at bedtime, she is 

continued to have minimal cough no phlegm production, she is using Flonase and 

Claritin for postnasal drip, she denies any abdominal pain, she has not had a 

bowel movement attempted we will start the patient on lactulose 20 g orally 

twice every day, start the patient on Senokot 2 tablets at bedtime, follow-up 

with the patient very closely, we'll discuss with cardiology and cardiothoracic 

surgery team whether the patient would have TAVR next week. 


 





REVIEW OF SYSTEMS


Constitutional: No fever, no chills, no night sweats.  No weight change.  

Positive for generalized weakness, positive for fatigue or lethargy.  No daytime

sleepiness.


HEENT: No headache.  No blurred vision or double vision, no loss of vision.  No 

loss of Hearing, no ringing in the ears, no dizziness.  No nasal drainage or 

congestion.  No epistaxis.  No sore throat.


Lungs: Positive for shortness of breath, positive for cough, no sputum 

production.  No wheezing.  Reports dyspnea on exertion.


Cardiovascular: No chest pain, no lower extremity edema.  No palpitations.  No 

paroxysmal nocturnal dyspnea.  No orthopnea.  No lightheadedness or dizziness.  

No syncopal episodes.


Abdominal: No right lower quadrant abdominal pain.  No nausea, vomiting.  No 

diarrhea.  No constipation.  No bloody or tarry stools.  Reports loss of 

appetite.


Genitourinary: No dysuria, increased frequency, urgency.  No urinary retention.


Musculoskeletal: No myalgias.  Generalized muscle weakness, no gait dysfunction,

no frequent falls.  Positive for back pain.  No neck pain.


Integumentary: No wounds, no lesions.  No rash or pruritus.  Positive for 

bruising.  No change in hair or nails.


Neurologic: No aphasia. No facial droop. No change in mentation. No head injury.

No headache. No paralysis. No paresthesia.


Psychiatric: Positive for depression.  Reports anxiety.  Positive for mood 

swings


Endocrine: No abnormal blood sugars.  Positive for weight change.   





PHYSICAL EXAMINATION


Gen: This is a 71-year-old  female.


HEENT: Head is atraumatic, normocephalic. Pupils equal, round. Sclerae is 

anicteric. 


NECK: Supple. No JVD. No lymphadenopathy. No thyromegaly. 


LUNGS: Decreased breath sounds at the bases, decreased tactile fremitus at the 

right lower third, few rhonchi, no expiratory wheeze, no chest wall tenderness. 

No intercostal retractions.


HEART: First heart sound is depressed, second heart sound is normal, systolic 

ejection murmur 3/6 at the right upper sternal border radiating to the base of 

the neck, systolic ejection murmur 2/6 at the apex rating to the axilla.


ABDOMEN: Soft, mild tenderness right lower quadrant, nondistended, positive 

bowel sounds, multiple ecchymosis.


EXTREMITIES: 1+ pedal edema.  No calf tenderness.  Her cells pedis +1 

bilaterally.  Bilateral hammertoes.  Left groin is soft, no hematoma, no blee

ding.


NEUROLOGICAL: Patient is awake, alert and oriented x3. Cranial nerves 2 through 

12 are grossly intact.,  Cranial nerves II-12 appear grossly intact, muscle 

power 4 out of 5 in upper and lower extremities bilaterally.





ASSESSMENT AND PLAN





1.   Non ST elevation  myocardial infarction with history of prior dilated 

cardiomyopathy status post coronary angiography finding right dominant vessel, 

99% ostial stenosis, aortic pressure is 100/70, 40 mm gradient across the aortic

valve.  Left ventricular end diastolic pressure is 2530.  Patient was 

transferred to [facility] for PTCA and stent placement of the 

RCA which was unsuccessful.  Consult with cardiothoracic surgery appreciated.  

Intensivist consultation appreciated.  Continue aspirin 81 mg daily, PLAVIX 75 

MG orally daily, Coreg 6.25 mg twice daily,continue Atorvastatin 40 mg orally 

daily, now with successful   Stent of the RCA today with Dr. Jacobs 4/7.LAD 

stenosis to be addressed at a later time.





2.  Dilated cardiomyopathy.  continue patient on carvedilol 3.125 mg orally 

twice every day, digoxin 125 g daily, we will continue with spironolactone 25 

mg orally daily .





3.  Severe aortic valve stenosis with mild aortic regurgitation.  TAVR to be 

scheduled in the near future.





4.  Moderate to Severe mitral regurgitation. likely will continue with 

conservative management no plan for mitral valve repair. 





5.  Hyperlipidemia.   She was started on atorvastatin 40 mg once every day.





6.  Obesity with obstructive sleep apnea and obesity hypoventilation syndrome.  

Patient has not had sleep study done yet. 





7.  Chronic hypoxic respiratory failure secondary to his Covid 19 and underlying

COPD and possible obstructive sleep apnea and hypoventilation syndrome.  Patient

is normally on 2 L nasal cannula.





8.  Acute on chronic systolic heart failure.  Off Lasix, continue Coreg, Aldac

tone 25 mg daily.  





9.  Hypertension, hypertensive cardiovascular disease. continue carvedilol 3.125

mg orally twice every day patient is not on ACE-I or ARB due to severe 

hypertension,  





10.  Recurrent depression and generalized anxiety disorder.  Consult consult 

with psychiatry appreciated.  Patient on Zoloft 125 mg at bedtime


 


11.  DVT prophylaxis. we will continue with Lovenox 40 mg SC daily 





12.  GI  prophylaxis.  Protonix 40 mg IV push daily. 





13.  Acute retroperitoneal hematoma with acute blood loss anemia status post 

transfusion 1 unit of packed RBCs.  Consult with vascular surgery appreciated. 

No plan for any surgical interventions.  Monitor closely for bleeding.





14. constipation.  Start the patient on Senokot 2 tablet at bedtime along with 

lactulose 20 g orally twice every day





15 .. Family updated yesterday , spoke with her daughter over the phone [facility].





16.  consult for Sub-acute rehab in preparation for TAVR or will do

TAVR then will send to Rehab.





17. Full code.








Objective





- Vital Signs


Vital signs: 


                                   Vital Signs











Temp  98.2 F   04/09/22 08:00


 


Pulse  87   04/09/22 08:00


 


Resp  17   04/09/22 08:00


 


BP  94/63   04/09/22 08:00


 


Pulse Ox  100   04/09/22 08:00








                                 Intake & Output











 04/08/22 04/09/22 04/09/22





 18:59 06:59 18:59


 


Intake Total 120 50 


 


Output Total 500 225 


 


Balance -380 -175 


 


Intake:   


 


  Oral 120 50 


 


Output:   


 


  Urine 500 225 


 


  Stool 0 0 


 


Other:   


 


  Voiding Method Incontinent Incontinent Incontinent





 External Catheter External Catheter External Catheter


 


  # Voids 1  








                       ABP, PAP, CO, CI - Last Documented











Arterial Blood Pressure        107/66

















- Labs


CBC & Chem 7: 


                                 04/08/22 07:20





                                 04/08/22 07:20


Labs: 


                  Abnormal Lab Results - Last 24 Hours (Table)











  04/09/22 Range/Units





  06:43 


 


POC Glucose (mg/dL)  149 H  (75-99)  mg/dL

## 2022-04-10 LAB
ALBUMIN SERPL-MCNC: 2.9 G/DL (ref 3.5–5)
ALP SERPL-CCNC: 57 U/L (ref 38–126)
ALT SERPL-CCNC: 13 U/L (ref 4–34)
ANION GAP SERPL CALC-SCNC: 3 MMOL/L
AST SERPL-CCNC: 27 U/L (ref 14–36)
BASOPHILS # BLD AUTO: 0 K/UL (ref 0–0.2)
BASOPHILS NFR BLD AUTO: 0 %
BUN SERPL-SCNC: 17 MG/DL (ref 7–17)
CALCIUM SPEC-MCNC: 8.2 MG/DL (ref 8.4–10.2)
CHLORIDE SERPL-SCNC: 105 MMOL/L (ref 98–107)
CO2 SERPL-SCNC: 29 MMOL/L (ref 22–30)
EOSINOPHIL # BLD AUTO: 0.2 K/UL (ref 0–0.7)
EOSINOPHIL NFR BLD AUTO: 2 %
ERYTHROCYTE [DISTWIDTH] IN BLOOD BY AUTOMATED COUNT: 2.52 M/UL (ref 3.8–5.4)
ERYTHROCYTE [DISTWIDTH] IN BLOOD: 19.2 % (ref 11.5–15.5)
GLUCOSE SERPL-MCNC: 132 MG/DL (ref 74–99)
HCT VFR BLD AUTO: 24.6 % (ref 34–46)
HGB BLD-MCNC: 7.6 GM/DL (ref 11.4–16)
LYMPHOCYTES # SPEC AUTO: 0.8 K/UL (ref 1–4.8)
LYMPHOCYTES NFR SPEC AUTO: 8 %
MAGNESIUM SPEC-SCNC: 2.2 MG/DL (ref 1.6–2.3)
MCH RBC QN AUTO: 30.2 PG (ref 25–35)
MCHC RBC AUTO-ENTMCNC: 30.9 G/DL (ref 31–37)
MCV RBC AUTO: 97.6 FL (ref 80–100)
MONOCYTES # BLD AUTO: 0.5 K/UL (ref 0–1)
MONOCYTES NFR BLD AUTO: 5 %
NEUTROPHILS # BLD AUTO: 8.2 K/UL (ref 1.3–7.7)
NEUTROPHILS NFR BLD AUTO: 83 %
PLATELET # BLD AUTO: 221 K/UL (ref 150–450)
POTASSIUM SERPL-SCNC: 4.3 MMOL/L (ref 3.5–5.1)
PROT SERPL-MCNC: 5.6 G/DL (ref 6.3–8.2)
SODIUM SERPL-SCNC: 137 MMOL/L (ref 137–145)
WBC # BLD AUTO: 10 K/UL (ref 3.8–10.6)

## 2022-04-10 RX ADMIN — Medication SCH MG: at 17:25

## 2022-04-10 RX ADMIN — DOCUSATE SODIUM AND SENNOSIDES SCH EACH: 50; 8.6 TABLET ORAL at 08:54

## 2022-04-10 RX ADMIN — BUDESONIDE AND FORMOTEROL FUMARATE DIHYDRATE SCH: 160; 4.5 AEROSOL RESPIRATORY (INHALATION) at 19:56

## 2022-04-10 RX ADMIN — IPRATROPIUM BROMIDE AND ALBUTEROL SULFATE SCH: .5; 3 SOLUTION RESPIRATORY (INHALATION) at 11:55

## 2022-04-10 RX ADMIN — LACTULOSE SCH: 20 SOLUTION ORAL at 08:54

## 2022-04-10 RX ADMIN — DOCUSATE SODIUM AND SENNOSIDES SCH EACH: 50; 8.6 TABLET ORAL at 21:10

## 2022-04-10 RX ADMIN — OXYCODONE HYDROCHLORIDE AND ACETAMINOPHEN SCH MG: 500 TABLET ORAL at 06:50

## 2022-04-10 RX ADMIN — ENOXAPARIN SODIUM SCH MG: 40 INJECTION SUBCUTANEOUS at 08:55

## 2022-04-10 RX ADMIN — SERTRALINE HYDROCHLORIDE SCH MG: 25 TABLET ORAL at 21:10

## 2022-04-10 RX ADMIN — LORATADINE SCH MG: 10 TABLET ORAL at 08:54

## 2022-04-10 RX ADMIN — SERTRALINE HYDROCHLORIDE SCH MG: 100 TABLET ORAL at 21:10

## 2022-04-10 RX ADMIN — IPRATROPIUM BROMIDE AND ALBUTEROL SULFATE SCH: .5; 3 SOLUTION RESPIRATORY (INHALATION) at 19:56

## 2022-04-10 RX ADMIN — ACETAMINOPHEN PRN MG: 325 TABLET, FILM COATED ORAL at 21:10

## 2022-04-10 RX ADMIN — LACTULOSE SCH GM: 20 SOLUTION ORAL at 21:11

## 2022-04-10 RX ADMIN — Medication SCH MG: at 06:50

## 2022-04-10 RX ADMIN — PANTOPRAZOLE SODIUM SCH MG: 40 INJECTION, POWDER, FOR SOLUTION INTRAVENOUS at 08:54

## 2022-04-10 RX ADMIN — IPRATROPIUM BROMIDE AND ALBUTEROL SULFATE SCH: .5; 3 SOLUTION RESPIRATORY (INHALATION) at 16:10

## 2022-04-10 RX ADMIN — DIGOXIN SCH MCG: 125 TABLET ORAL at 08:54

## 2022-04-10 RX ADMIN — OXYCODONE HYDROCHLORIDE AND ACETAMINOPHEN SCH MG: 500 TABLET ORAL at 17:25

## 2022-04-10 RX ADMIN — CLOPIDOGREL BISULFATE SCH MG: 75 TABLET ORAL at 08:54

## 2022-04-10 RX ADMIN — ASPIRIN 81 MG CHEWABLE TABLET SCH MG: 81 TABLET CHEWABLE at 08:54

## 2022-04-10 RX ADMIN — ATORVASTATIN CALCIUM SCH MG: 40 TABLET, FILM COATED ORAL at 21:10

## 2022-04-10 RX ADMIN — IPRATROPIUM BROMIDE AND ALBUTEROL SULFATE SCH: .5; 3 SOLUTION RESPIRATORY (INHALATION) at 07:51

## 2022-04-10 RX ADMIN — BUDESONIDE AND FORMOTEROL FUMARATE DIHYDRATE SCH: 160; 4.5 AEROSOL RESPIRATORY (INHALATION) at 07:51

## 2022-04-10 RX ADMIN — SPIRONOLACTONE SCH MG: 25 TABLET, FILM COATED ORAL at 08:54

## 2022-04-10 NOTE — P.PN
Subjective


Progress Note Date: 04/10/22











This is a pleasant 71-year-old female patient with biventricular failure, severe

aortic stenosis, mitral regurgitation and coronary artery disease.  The patient 

underwent a cardiac catheterization and the patient was found to have 99% RCA 

stenosis and the patient is post non-ST segment elevation myocardial infarction.

 The patient had an unsuccessful PCI of the RCA.  Initially, she was being 

contemplated for cardiac surgery knowing that she has valvular heart disease and

she was also being looks for a single-vessel bypass surgery.  Ultimately, it was

decided to do another cardiac catheterization tomorrow.  She is known to have 

dilated cardiomyopathy and impaired left ventricular ejection fraction at 

patient presented with acute CHF.  She is currently on O2 at 2 L per minute 

nasal cannula.  She is comfortable.  Nevertheless, she is anxious and she denies

having any chest pain.  Hemodynamically stable on no pressors.  She continues to

be guarded with Lasix and she is on 8040 mg IV every 12 hours.  She is off IV 

heparin as the patient developed an acute hematoma the right femoral artery/tamia

in area at the site of the cardiac catheterization.  The hematoma today is quite

soft and the patient has a hemoglobin of 8.6 which is essentially compatible to 

yesterday of 9.1.  There has been some mild drop in hemoglobin 9 of the 

hemoglobin on 04/03/2022 was 10.0.  Platelet counts are normal.  The hematoma in

the right groin is soft.  The plan is to proceed with another cardiac 

catheterization hopefully within the next 24 hours.  Of notice, isn't elevation 

in the creatinine which is up to 1.2 on today's evaluation.  The neck fluid 

balance over the past 24 hours has been in the order of +98 mL on this patient. 

Based on that, the Lasix has been placed on hold.  Meanwhile, the patient veena

nues to be on IV heparin infusion.  Note that the IV heparin was discontinued.  

Now that there is no active bleeding from the hematoma, cardiology gave orders 

to restart the IV heparin.





04/05/2020, the patient is being seen for a follow-up.  Overnight, the patient 

developed further drop in hemoglobin down to 6.9 as the patient was having some 

retroperitoneal bleeding.  IV heparin was discontinued.  The patient got 

transfused with a 1 units of packed RBC.  CAT scan of the chest abdomen and 

pelvis was done and it showed evidence of retroperitoneal bleed on the right 

displacing bladder.  There was also a small to moderate-sized right-sided 

pleural effusion and some pulmonary vascular congestion.  Based on that, IV 

heparin was discontinued and the patient was given a total of 1 units of packed 

RBC and hemoglobin today is at 7.6.  History of any chest pain.  She continues 

to have some shortness of breath at rest. Fluid balance has been +1.2 L over the

past 24 hours.  She is currently on 2 L about 2 by nasal cannula.  The plan is 

to undergo a cardiac catheterization and stenting of the RCA and cardiology is 

on the case.  Despite the drop in hemoglobin, and ongoing esophageal bleed, the 

patient's groin is soft and there is a soft area of ecchymosis along the right 

groin area.  Pulses in lower extremity is diminished at the present.





04/06/2022, condition is stable and unchanged compared to yesterday.  No signs 

of any ongoing bleeding and the patient's hemoglobin today is at 7.6 which is 

comparable to yesterday.  No change in the hematoma along the right groin area. 

No anticoagulants for now the patient is taken on Lovenox for prophylaxis.  The 

patient has no new complaints.  Resting comfortably in bed.  No coronary 

intervention was done and the cardiac interventional cardiology, decided to wait

a few more days until this is a pleasant hematoma is more settled prior doing a

ny intervention.  Meanwhile, the patient remains on 2 L of O2 by nasal cannula. 

Input output balance has been +230 mL over the past 24 hours.  BUN is a 36 with 

a creatinine of 0.6 and the potassium level of 4.6 and his sodium level of 134. 

White cell count is at 9.8 and the plated count is at 152.  No altered 

mentation.  The patient remains on aspirin.  The patient on Lovenox for DVT 

prophylaxis 40 mg subcu.  The patient remains on Coreg 3.125 mg by mouth twice a

day and digoxin and Aldactone.





04/07/2022, condition is stable.  No new complaints overnight.  The patient will

be taken to cardiac catheterization.  No acute chest pain or shortness of breath

overnight.  Most recent hemoglobin from yesterday was at 7.6 and the patient has

not had any further hemoglobins checked since yesterday.  Nevertheless, there is

no signs of any ongoing bleed and the groin bleed/hematoma remains stable.  The 

patient has no chest pain.  No shortness of breath.  The patient on no 

anticoagulants for now.  The plan will be a cardiac catheterization today.  The 

patient remains on Lovenox 40 mg subcu 40 to prophylaxis.  The patient on 

Aldactone.  The patient is on a combination of aspirin and Plavix.  Awaiting 

final recommendations from cardiology post cardiac catheterization.





04/08/2022, Trish is doing well.  She is currently on room air oxygen.  No 

new complaints.  No chest pain.  No respiratory difficulties.  She is post 

cardiac catheterization and stenting of the RCA.  The puncture site in the left 

groin area is dry clean and intact and the patient has no hematoma in the groins

bilaterally.  Meanwhile, the patient has a white cell count of 10.8 with 

hemoglobin of 7.7.  Sodium is at 136 with a BUN of 19 and a creatinine of 0.6.  

The patient is currently on aspirin, Plavix, and the patient is also on Coreg 

3.12 Mg twice a day and Aldactone.  Anticoagulants with Lovenox 40 mg prophyla

xis 40 mg subcu every 24 hours.  The patient is also on Lipitor 40 mg by mouth 

daily.  No other active issues for now.  The patient can be transferred out of 

the intensive care unit.  Tentative plan is to consider this patient for a TAVR 

procedure to later stage.








04/09/2022, the patient is stable to no new complaints.  The patient got chested

out of the intensive care unit yesterday.  No chest pain.  No shortness of 

breath.  She remains on Coreg 3.125 mg twice a day and the patient is also on 

digoxin 125 g on a daily basis and the patient remains on a potassium sparing 

diuretics which is Aldactone 25 mg on a daily basis.  She is currently on 2 L 

about 2 by nasal cannula pH is on Lovenox 40 the prophylaxis.  No new blood work

from today.  Hemoglobin from yesterday was at 7.7.  The patient is resting 

comfortably in bed.





04/10/2022, no complains and the patient resting comfortably in bed.  No chest 

pain.  No shortness of breath.  Morbid is improved and the patient is looking 

more enthusiastic in terms of working with physical therapy.  The white cell 

count is at 7 with a hemoglobin of 7.6, electrodes are within normal with a BUN 

of 17 creatinine 0.4.  The patient is post cardiac catheterization and stenting 

of the RCA.  The patient remained and Aldactone.  She is using incentive 

spirometer.  She has chronic anemia with a hemoglobin of 7.6, unchanged compared

to yesterday.  Mobility is limited at this point in time.  She is being 

considered for TAVR procedure.  No signs of any active bleeding in the groin.





Objective





- Vital Signs


Vital signs: 


                                   Vital Signs











Temp  98 F   04/10/22 08:00


 


Pulse  90   04/10/22 11:30


 


Resp  17   04/10/22 11:30


 


BP  96/65   04/10/22 11:30


 


Pulse Ox  98   04/10/22 11:30








                                 Intake & Output











 04/09/22 04/10/22 04/10/22





 18:59 06:59 18:59


 


Intake Total 120  120


 


Output Total 950 0 250


 


Balance -830 0 -130


 


Intake:   


 


  Oral 120  120


 


Output:   


 


  Urine 950  250


 


  Stool  0 


 


Other:   


 


  Voiding Method Incontinent Incontinent Incontinent





 External Catheter External Catheter External Catheter


 


  # Voids 1  








                       ABP, PAP, CO, CI - Last Documented











Arterial Blood Pressure        107/66

















- Exam








CONSTITUTIONAL:  Tearful, cooperative and is in no acute distress.  The patient 

remains anxious.The patient is currently on room air oxygen


Head exam was generally normal. There was no scleral icterus or corneal arcus. 

Mucous membranes were moist.


Neck was supple and without jugular venous distension, thyromegaly, or carotid 

bruits. Carotids were easily palpable bilaterally. There was no adenopathy.


RESPIRATORY:  Lungs sounds essentially clear throughout, diminished bilateral 

bases, right greater than left.  Respirations are symmetrical and nonlabored.  

Currently on 2 L nasal cannula with oxygen saturation 96%.  Able to achieve 1500

mL on incentive spirometry.  Strong cough.  


CARDIOVASCULAR:  S1, S2 present, pansystolic murmur present 3/6.  Regular rate 

and rhythm, sinus rhythm on telemetry.  Doppler lower extremity pulses 

bilaterally.  No calf pain or tenderness noted. 


GASTROINTESTINAL:  Abdomen soft, nontender, nondistended.  Active bowel sounds 

present 4 quadrants.  Tolerating diet.  A soft hematoma is present over the 

right inguinal area which has not enlarged in size.


INTEGUMENTARY:  Skin is warm and dry with evidence of good perfusion.  


NEUROLOGIC:  Cranial nerves II through XII intact


MUSKULOSKELETAL:  Able to move all extremities, strength equal bilaterally.


PSYCHIATRIC:  Alert, oriented to person place and time, flat affect.











- Labs


CBC & Chem 7: 


                                 04/10/22 07:53





                                 04/10/22 07:53


Labs: 


                  Abnormal Lab Results - Last 24 Hours (Table)











  04/09/22 04/10/22 04/10/22 Range/Units





  12:44 07:53 07:53 


 


WBC  12.4 H    (3.8-10.6)  k/uL


 


RBC  2.53 L  2.52 L   (3.80-5.40)  m/uL


 


Hgb  7.7 L  7.6 L   (11.4-16.0)  gm/dL


 


Hct  24.6 L  24.6 L   (34.0-46.0)  %


 


MCHC   30.9 L   (31.0-37.0)  g/dL


 


RDW  18.5 H  19.2 H   (11.5-15.5)  %


 


Neutrophils #   8.2 H   (1.3-7.7)  k/uL


 


Lymphocytes #   0.8 L   (1.0-4.8)  k/uL


 


Creatinine    0.47 L  (0.52-1.04)  mg/dL


 


Glucose    132 H  (74-99)  mg/dL


 


Calcium    8.2 L  (8.4-10.2)  mg/dL


 


Total Bilirubin    1.7 H  (0.2-1.3)  mg/dL


 


Total Protein    5.6 L  (6.3-8.2)  g/dL


 


Albumin    2.9 L  (3.5-5.0)  g/dL














Assessment and Plan


Plan: 











1 acute hypoxic respiratory failure, currently on RA oxygen by nasal cannula, 

essentially secondary to valvular heart disease/CAD and secondary decompensated 

heart failure.  Clinically stable 





2 acute non-ST segment elevation myocardial infarction.  The patient is post 

cardiac catheterization and the patient is a 99% RCA stenosis and the patient is

status post successful PCI to RCA.   





3 severe aortic valve stenosis with a peak and mean gradient of 78 and 47 mmHg, 

and the patient continues to have a harsh cardiac murmur systolic ejection on 

examination.  Condition is stable and the patient remains on Aldactone





4 mitral regurgitation





5 dilated cardiomyopathy with impaired left ventricular ejection fraction 

systolic heart failure with an ejection fraction of 25-30%





6 bilateral pleural effusion secondary to above





7 hypertension





8 hyperlipidemia





9 COVID 19 pneumonia back in September 2021





10 retroperitoneal bleed with a development of a soft right groin hematoma at a 

set of cardiac catheterization,  the hemoglobin remains stable for now and there

is no interval worsening in the right groin hematoma





11 acute blood loss anemia with a hemoglobin of  6.9 and the patient got 

transfused with a total of 1 units of packed RBC and IV heparin was 

discontinued.  In the hemoglobin has remained stable





12 acute kidney injury improving and the creatinine is normalized





13 medical debility seconds above-mentioned comorbidities





14 right-sided pleural effusion secondary to CHF





15 anemia of chronic disease with a hemoglobin of 7.6





Plan








Patient is still being considered for TAVR procedure


We'll need to increase her mobility and activity


Hemoglobin stable at 7.6


post successful stenting of the RCA and the patient's history of any chest pain


Continue aspirin and statins and beta blockers in the form of Coreg 


Cardiothoracic surgeries on the case regarding possibility of bypass/aortic 

valve replacement versus PCI and possibly TaVR procedure regarding the aortic 

valve.   


Long-term prognosis poor baseline above-mentioned comorbidities.  


We'll continue to follow

## 2022-04-10 NOTE — XR
EXAMINATION TYPE: XR chest 1V

 

DATE OF EXAM: 4/10/2022

 

CLINICAL HISTORY: Difficulty breathing enhanced CT chest progress study.  

 

TECHNIQUE: Single AP portable upright view of the chest is obtained.

 

COMPARISON: Chest x-ray from April 1, 2022

 

FINDINGS:  Persistent cardiomegaly. Improved central vascular congestion and interstitial edema. No n
ew focal airspace opacity. Osseous structures are intact.

 

IMPRESSION: Cardiomegaly with improved edema. No new infiltrate.

## 2022-04-10 NOTE — P.PN
Subjective


Progress Note Date: 04/10/22





This is a 71-year-old female who was admitted to [facility] with 

symptoms of CHF and evidence of severe aortic stenosis and also moderate mitral 

regurgitation.  Patient also has moderate lung disease.  Patient was evaluated 

by cardiac catheterization and was found to have 99% stenosis of the ostium of 

the right coronary artery and moderate disease in the LAD.  Patient also has 

severe aortic stenosis with a peak gradient of 40 across the valve.  Given her 

LV dysfunction, that was felt to be very significant.  Echocardiogram done in 

this institution again showed severe aortic stenosis and evidence of severe 

cardiomyopathy with ejection fraction of 20-30%.  Attempted stent placement of 

the RCA, resulting in dissection.  The procedure was abandoned surgical consult 

was obtained.  It.  Patient was felt to be high risk candidate and a duration 

was made to attempt percutaneous intervention of the RCA and FFR of the LAD, 

which is being scheduled for tomorrow.  Patient apparently developed a hematoma 

in the right groin after 3 days of heparin.  Heparin was discontinued at was 

restarted again see is relatively stable.  Has chronic complaints of being 

fatigued and tired.  Lungs appeared to be clear.  Heart is regular.  Her urine 

output is fair.  We'll continue current medical therapy and await for 

intervention tomorrow.





04/05/2022: The patient's clinical status remains around the same.  Denies any 

chest pain or shortness of breath but seemed to be weak and fatigued.  She was n

oted to have further drop in hemoglobin and she was taken off the heparin.  A 

computed tomography scan of the abdomen and pelvis showed evidence of 

retroperitoneal hematoma and also of subcutaneous hematoma around groin site.  

The puncture radiologically looks appropriate and is the reason for this 

hematoma is not clear.  A vascular consult is pending.  She was scheduled to 

have intervention today but probably be postponed.  Cardiac surgery is also 

following.  Heart is regular.  She is maintaining sinus rhythm.  Lungs are 

clear.  Continue current medical therapy.





04/06/2022: Patient's remains relatively stable.  Her hemoglobin is stable 

without evidence of acute or active bleeding.  Groin is soft, denies any chest 

pain and doesn't appear to be in acute distress.  Her creatinine is normal.  

Discussed with patient and also daughter regarding further intervention.  Dr. Jacobs's willing to try again to do percutaneous intervention.  After that 

patient could have transcatheter aortic valve replacement.  Patient and daughter

are fully aware of the risks associated with the procedure and wanted to have 

this is done locally.  They were given the option of going to a tertiary center 

like [facility].  However, patient is reluctant and wants to have it 

done here as soon as possible.  Lungs are clear.  Heart is regular.  Height, 

came to Dr. Jacobs to proceed with intervention probably tomorrow.





04/08/2022: This patient had stent placement of the ostial RCA, yesterday.  Kiel

erated the procedure fairly well.  Patient also has moderate to severe LAD 

lesion with significant FFR.  However, it is recommended that patient have 

transcatheter aortic valve replacement.  This will be planned for the next one 

or 2 weeks.  Patient seemed to be feeling better.  Her hemoglobin is stable.  

Puncture site on the left side is feeling well without any hematoma.  Followed 

by vascular surgery and no intervention is recommended.  Patient is activities 

to be increased and if necessary.  Patient will be transferred to the telemetry 

unit.  Lungs are clear.  Heart is regular.  Further recommended lipid upon the 

clinical course





04/09/2022: Patient states that she has been up and ambulated in her room for a 

little bit.  She denies any chest pain, lightheadedness or dizziness at this ti

me.  Blood pressure is on the soft side and 91/53.  Cardiac monitor has been a 

sinus rhythm in the 60s to 80s.  Repeat blood work reveals hemoglobin of 7.7.  

WBC 12.4.  Discussed with patient the need for subacute rehab per therapy notes 

and she became quite upset but will need social work to discuss discharge 

planning with her.





4/10/2022: Patient denies having any chest pain and no shortness of breath at 

rest.  The patient is currently on 2 L nasal cannula, blood pressure 96/65, 

heart rate in the 80s and 90s.  Repeat blood work hemoglobin 7.6, BUN 17 

creatinine 0.47, potassium 4.3.  Magnesium 2.2.


Repeat chest x-ray reveals cardiomegaly with improved edema.  No new infiltrate.


Patient is stating that she was out of bed while she was in ICU but has not been

active on the cardiac stepdown unit.  PT and OT are in place and recommending 

subacute rehab. TAVR most likely will be postponed at least 2 weeks.





- Exam


GENERAL EXAM: Patient is alert and oriented and doesn't appear to be in any 

acute distress


HEENT: Normocephalic. Normal reaction of pupils, equal size, normal range of 

extraocular motion.


NECK: No masses, no nuchal rigidity.


CHEST: No chest wall deformity.


LUNGS: Diminished breath sounds at bases


HEART: S1 and S2 normal .  Systolic murmur in the aortic area


ABDOMEN: No hepatosplenomegaly, normal bowel sounds, no guarding or rigidity.


SKIN: No rashes


CENTRAL NERVOUS SYSTEM: No focal deficits.


EXTREMITIES: No cyanosis, clubbing or edema.





Assessment 


Coronary artery disease


Severe aortic stenosis


Nonischemic cardiomyopathy


Restrictive lung disease











Plan


Increase activity, Patient to walk with assistance 


Follow-up for TAVR procedure, Not scheduled for at least 2 weeks 


Continue current cardiac medications


Patient may be discharged to return later time for procedure





Nurse practitioner note has been reviewed, I agree with documented findings and 

plan of care.  Patient was seen and examined.








Objective





- Vital Signs


Vital signs: 


                                   Vital Signs











Temp  98.1 F   04/10/22 04:00


 


Pulse  83   04/10/22 04:00


 


Resp  16   04/10/22 04:00


 


BP  88/55   04/10/22 04:00


 


Pulse Ox  97   04/10/22 04:00








                                 Intake & Output











 04/09/22 04/10/22 04/10/22





 18:59 06:59 18:59


 


Intake Total 120  120


 


Output Total 950 0 


 


Balance -830 0 120


 


Intake:   


 


  Oral 120  120


 


Output:   


 


  Urine 950  


 


  Stool  0 


 


Other:   


 


  Voiding Method Incontinent Incontinent 





 External Catheter External Catheter 


 


  # Voids 1  








                       ABP, PAP, CO, CI - Last Documented











Arterial Blood Pressure        107/66

















- Labs


CBC & Chem 7: 


                                 04/10/22 07:53





                                 04/10/22 07:53


Labs: 


                  Abnormal Lab Results - Last 24 Hours (Table)











  04/09/22 04/10/22 04/10/22 Range/Units





  12:44 07:53 07:53 


 


WBC  12.4 H    (3.8-10.6)  k/uL


 


RBC  2.53 L  2.52 L   (3.80-5.40)  m/uL


 


Hgb  7.7 L  7.6 L   (11.4-16.0)  gm/dL


 


Hct  24.6 L  24.6 L   (34.0-46.0)  %


 


MCHC   30.9 L   (31.0-37.0)  g/dL


 


RDW  18.5 H  19.2 H   (11.5-15.5)  %


 


Neutrophils #   8.2 H   (1.3-7.7)  k/uL


 


Lymphocytes #   0.8 L   (1.0-4.8)  k/uL


 


Creatinine    0.47 L  (0.52-1.04)  mg/dL


 


Glucose    132 H  (74-99)  mg/dL


 


Calcium    8.2 L  (8.4-10.2)  mg/dL


 


Total Bilirubin    1.7 H  (0.2-1.3)  mg/dL


 


Total Protein    5.6 L  (6.3-8.2)  g/dL


 


Albumin    2.9 L  (3.5-5.0)  g/dL

## 2022-04-10 NOTE — P.PN
Subjective


Progress Note Date: 04/10/22





HISTORY OF PRESENT ILLNESS


This is a 71-year-old female with past medical history of dilated cardiomyopathy

with ejection fraction of 15-20%, severe aortic valve stenosis, mild aortic 

regurgitation with moderate to severe mitral regurgitation, chronic systolic 

heart failure, hyperlipidemia, obesity with possible obstructive sleep apnea and

obesity hypoventilation syndrome, chronic hypoxic respiratory failure on home O2

at 2 L nasal cannula, hypertension hypertensive cardio vascular disease, 

previous Covid 19 infection.  Patient presented to [facility] 

slight elevation of troponin, EKG showed changes suggestive ischemic changes and

was started on aspirin, heparin drip and admitted to the hospital.  Patient was 

seen by cardiology. She was supposed to have a heart catheterization done as an 

outpatient along with [facility] for possible aortic valve replacement and mitral valve

and possible CABG.  Patient underwent coronary angiography finding right 

dominant vessel, 99% ostial stenosis, aortic pressure is 100/70, 40 mm gradient 

across the aortic valve.  Left ventricular end diastolic pressure is 2530.  

Patient was transferred to [facility] for PTCA and stent 

placement of the RCA which was unsuccessful.  Consult with cardiothoracic 

surgery for CABG and valve repair/replacement.





4/1: Patient is seen today in the intensive care unit.  She denies chest pain or

shortness of breath. She complains of anxiety and decreased appetite. Less cough

today.She has been seen by intensivist and cardiothoracic surgery, currently on 

heparin drip, consult was added for dental evaluation and clearance and 

panoramic CT.  Patient has been afebrile, heart rate in the 80s, blood pressure 

101/73, pulse ox 96%.  Repeat blood work reveals CBC is unremarkable.  Potassium

4.0.  Urinalysis negative for infection.  Hepatitis panel negative.  MRSA nasal 

screen is in process.  CT of the chest revealed cardiomegaly with suspected 

pulmonary edema and moderate to marked right sided pleural effusion.  Associated

infection can't be excluded.


Echocardiogram reveals EF of 25-30% with mild concentric left ventricular 

hypertrophy, severe global hypokinesia of the LV, severe aortic stenosis with 

gradient 78.27 mm a new 3/47.94 mmHg, trace mitral regurgitation, mild mitral 

stenosis, mild tricuspid regurgitation, mild pulmonary hypertension, RVSP 43.33 

mmHg.


Carotid ultrasound revealed less than 50% stenosis bilateral carotid systems.





4/2: Patient is sitting up in bed she underwent CTA of the chest for evaluation 

of dissection of the right coronary artery, she denies any chest pain at this 

time, she has no shortness breath, she has no abdominal pain, nausea or 

vomiting, she continues to have some coughing noted from production, she 

continues to be somewhat depressed and anxious and she is not sure what to do, 

she is missing her daughter she stated and she doesn't think her daughter is 

able to come and see her.





4/3: Patient is sitting on the bed in no apparent distress, she continues to be 

quite depressed and anxious about her situation, she has not made up her mind, I

had a long conversation with Dr. Jacobs regarding the plan to pursue the [facility] care at this time, she was deemed high surgical risk per cardiovascular 

surgery service at the current [facility], and she would benefit from a 

transcatheter aortic valve replacement plus reattempted PCI of the RCA, her CT 

angiography of the chest did not show evidence of any dissection of the thoracic

aorta , patient is maintained on Zoloft 100 mg every day, she is mentioned in 

ICU, and the plan to the PCI hopefully in the next 24 hours patient and her 

daughter Janneth are in agreement for conservative approach and they understand the

risks of the procedure and that she will have the TAVR at a later date or during

this hospital admission depends on her symptoms, we will consult Psychiatry for 

depression and possible bipolar disorder.





4/4: Patient remains in intensive care unit, afebrile, heart rate in the 80s, 

blood pressure 101/58, pulse ox 97% on room air.  Patient denies having any 

chest pain but continues to have anxiety issues.  Consult with psychiatry is 

pending.  Patient has been scheduled for CT TAVR protocol for today.  Repeat 

blood work reveals WBC 15, hemoglobin 8.6, platelet count 172.  Sodium 134, BUN 

41 creatinine 1.2.  Blood sugar 149.  Total bilirubin 1.4 otherwise liver 

function tests are normal.  Nasal MRSA screen is negative.





4/5: Patient remains in the intensive care unit.  Patient underwent CAT scan 

yesterday for TAVR protoxol and was found to have moderate size right pelvic 

retroperitoneal hematoma extending into the lower abdomen presumed from att

empted right groin catheter insertion.  Consult was added for vascular surgery 

with plan to continue supportive care, no plan for surgical intervention.  

Heparin was discontinued.  Patient was transfused 1 unit of packed RBCs for 

hemoglobin of 6.9 with repeat hemoglobin of 7.6.


Patient has been afebrile, heart rate 70, blood pressure 109/62, pulse ox 97%.  

Cardiac monitor is sinus rhythm.  Patient has also been seen and followed by 

psychiatry for depression and acute bereavement with the loss of her , 

recommendations Zoloft 125 mg daily at bedtime.





4/6: Patient remains in the intensive care unit.  No signs of bleeding and no 

worsening hematoma with hemoglobin stable at 7.6. Vascular surgery has cleared 

patient to resume heparin if needed, continue to monitor for bleeding and daily 

CBC.  Plan is for Dr. Jacobs will proceed with intervention tomorrow.  Patient

is also followed by intensivist, cardiothoracic surgery and psychiatry.





4/7: Patient is status post stent in the RCA today with Dr. Jacobs.  She is 

seen postprocedure and is comfortable.  She denies having any chest pain.  

Anxiety is controlled.  Approach was from the left groin, no bleeding or 

hematoma noted.  Patient remains afebrile, heart rate in the 80s, blood pressure

92/57, pulse ox 94% on 2 L nasal cannula.  Repeat hemoglobin is stable at 7.5, 

WBC 11.5.  Electrolytes are normal, BUN 21 creatinine 0.61.





4/8: Patient remains in the intensive care unit status post stent of the RCA 

yesterday.  Patient has moderate to severe LAD lesion with significant FFR to be

addressed at a later time as well asTAVR will be planned for the next 1-2 weeks.

 No hematoma to the left groin.  Patient is waiting for a bed on the cardiac 

stepdown unit. Vascular surgeries following for retroperitoneal hematoma that 

does not appear to be actively bleeding, plan for supportive care.  Patient 

remains afebrile heart rate 85, blood pressure 96/54, pulse ox 98% on room air..

 BUN 19 and creatinine 0.62.  Anticipate that patient will be able to work with 

PT and OT determine home care versus subacute rehab needs.





4/9: Patient was moved out of the ICU to a telemetry unit, she is in a better 

spurs, she responded very well to the sertraline 125 mg at bedtime, she is 

continued to have minimal cough no phlegm production, she is using Flonase and 

Claritin for postnasal drip, she denies any abdominal pain, she has not had a 

bowel movement attempted we will start the patient on lactulose 20 g orally 

twice every day, start the patient on Senokot 2 tablets at bedtime, follow-up 

with the patient very closely, we'll discuss with cardiology and cardiothoracic 

surgery team whether the patient would have TAVR next week. 





4/10: Patient sitting up in bed that she is feeling better today she continues 

to have significant constipation, she has not had a bowel movement despite all 

the medications, physical therapy will see the patient move the patient on, 

patient will likely be transferred to subacute rehabilitation initially 4 hours 

if there is no plan to doTAVR this coming week. 





REVIEW OF SYSTEMS


Constitutional: No fever, no chills, no night sweats.  No weight change.  

Positive for generalized weakness, positive for fatigue or lethargy.  No daytime

sleepiness.


HEENT: No headache.  No blurred vision or double vision, no loss of vision.  No 

loss of Hearing, no ringing in the ears, no dizziness.  No nasal drainage or 

congestion.  No epistaxis.  No sore throat.


Lungs: Positive for shortness of breath, positive for cough, no sputum 

production.  No wheezing.  Reports dyspnea on exertion.


Cardiovascular: No chest pain, no lower extremity edema.  No palpitations.  No 

paroxysmal nocturnal dyspnea.  No orthopnea.  No lightheadedness or dizziness.  

No syncopal episodes.


Abdominal: No right lower quadrant abdominal pain.  No nausea, vomiting.  No 

diarrhea.  No constipation.  No bloody or tarry stools.  Reports loss of 

appetite.


Genitourinary: No dysuria, increased frequency, urgency.  No urinary retention.


Musculoskeletal: No myalgias.  Generalized muscle weakness, no gait dysfunction,

no frequent falls.  Positive for back pain.  No neck pain.


Integumentary: No wounds, no lesions.  No rash or pruritus.  Positive for 

bruising.  No change in hair or nails.


Neurologic: No aphasia. No facial droop. No change in mentation. No head injury.

No headache. No paralysis. No paresthesia.


Psychiatric: Positive for depression.  Reports anxiety.  Positive for mood 

swings


Endocrine: No abnormal blood sugars.  Positive for weight change.   





PHYSICAL EXAMINATION


Gen: This is a 71-year-old  female.


HEENT: Head is atraumatic, normocephalic. Pupils equal, round. Sclerae is 

anicteric. 


NECK: Supple. No JVD. No lymphadenopathy. No thyromegaly. 


LUNGS: Decreased breath sounds at the bases, decreased tactile fremitus at the 

right lower third, few rhonchi, no expiratory wheeze, no chest wall tenderness. 

No intercostal retractions.


HEART: First heart sound is depressed, second heart sound is normal, systolic 

ejection murmur 3/6 at the right upper sternal border radiating to the base of 

the neck, systolic ejection murmur 2/6 at the apex rating to the axilla.


ABDOMEN: Soft, mild tenderness right lower quadrant, nondistended, positive 

bowel sounds, multiple ecchymosis.


EXTREMITIES: 1+ pedal edema.  No calf tenderness.  Her cells pedis +1 

bilaterally.  Bilateral hammertoes.  Left groin is soft, no hematoma, no 

bleeding.


NEUROLOGICAL: Patient is awake, alert and oriented x3. Cranial nerves 2 through 

12 are grossly intact.,  Cranial nerves II-12 appear grossly intact, muscle 

power 4 out of 5 in upper and lower extremities bilaterally.





ASSESSMENT AND PLAN





1.   Non ST elevation  myocardial infarction with history of prior dilated 

cardiomyopathy status post coronary angiography finding right dominant vessel, 

99% ostial stenosis, aortic pressure is 100/70, 40 mm gradient across the aortic

valve.  Left ventricular end diastolic pressure is 2530.  Patient was 

transferred to [facility] for PTCA and stent placement of the 

RCA which was unsuccessful.  Consult with cardiothoracic surgery appreciated.  

Intensivist consultation appreciated.  Continue aspirin 81 mg daily, PLAVIX 75 

MG orally daily, Coreg 6.25 mg twice daily,continue Atorvastatin 40 mg orally 

daily, now with successful   Stent of the RCA today with Dr. Jacobs 4/7.LAD 

stenosis to be addressed at a later time.





2.  Dilated cardiomyopathy.  continue patient on carvedilol 3.125 mg orally 

twice every day, digoxin 125 g daily, we will continue with spironolactone 25 

mg orally daily .





3.  Severe aortic valve stenosis with mild aortic regurgitation.  TAVR to be 

scheduled in the near future.





4.  Moderate to Severe mitral regurgitation. likely will continue with 

conservative management no plan for mitral valve repair. 





5.  Hyperlipidemia.   She was started on atorvastatin 40 mg once every day.





6.  Obesity with obstructive sleep apnea and obesity hypoventilation syndrome.  

Patient has not had sleep study done yet. 





7.  Chronic hypoxic respiratory failure secondary to his Covid 19 and underlying

COPD and possible obstructive sleep apnea and hypoventilation syndrome.  Patient

is normally on 2 L nasal cannula.





8.  Acute on chronic systolic heart failure.  Off Lasix, continue Coreg, 

Aldactone 25 mg daily.  





9.  Hypertension, hypertensive cardiovascular disease. continue carvedilol 3.125

mg orally twice every day patient is not on ACE-I or ARB due to severe 

hypertension,  





10.  Recurrent depression and generalized anxiety disorder.  Consult consult 

with psychiatry appreciated.  Patient on Zoloft 125 mg at bedtime


 


11.  DVT prophylaxis. we will continue with Lovenox 40 mg SC daily 





12.  GI  prophylaxis.  Protonix 40 mg IV push daily. 





13.  Acute retroperitoneal hematoma with acute blood loss anemia status post tr

ansfusion 1 unit of packed RBCs.  Consult with vascular surgery appreciated. No 

plan for any surgical interventions.  Monitor closely for bleeding.





14. constipation.  Start the patient on Senokot 2 tablet at bedtime along with 

lactulose 20 g orally twice every day





15 .. Family updated yesterday , spoke with her daughter over the phone [facility].





16.  consult for Sub-acute rehab in preparation for TAVR or will do

TAVR then will send to Rehab.





17. Full code.








Objective





- Vital Signs


Vital signs: 


                                   Vital Signs











Temp  98.1 F   04/10/22 04:00


 


Pulse  83   04/10/22 04:00


 


Resp  16   04/10/22 04:00


 


BP  88/55   04/10/22 04:00


 


Pulse Ox  97   04/10/22 04:00








                                 Intake & Output











 04/09/22 04/10/22 04/10/22





 18:59 06:59 18:59


 


Intake Total 120  


 


Output Total 950 0 


 


Balance -830 0 


 


Intake:   


 


  Oral 120  


 


Output:   


 


  Urine 950  


 


  Stool  0 


 


Other:   


 


  Voiding Method Incontinent Incontinent 





 External Catheter External Catheter 


 


  # Voids 1  








                       ABP, PAP, CO, CI - Last Documented











Arterial Blood Pressure        107/66

















- Labs


CBC & Chem 7: 


                                 04/10/22 07:53





                                 04/10/22 07:53


Labs: 


                  Abnormal Lab Results - Last 24 Hours (Table)











  04/09/22 04/10/22 04/10/22 Range/Units





  12:44 07:53 07:53 


 


WBC  12.4 H    (3.8-10.6)  k/uL


 


RBC  2.53 L  2.52 L   (3.80-5.40)  m/uL


 


Hgb  7.7 L  7.6 L   (11.4-16.0)  gm/dL


 


Hct  24.6 L  24.6 L   (34.0-46.0)  %


 


MCHC   30.9 L   (31.0-37.0)  g/dL


 


RDW  18.5 H  19.2 H   (11.5-15.5)  %


 


Neutrophils #   8.2 H   (1.3-7.7)  k/uL


 


Lymphocytes #   0.8 L   (1.0-4.8)  k/uL


 


Creatinine    0.47 L  (0.52-1.04)  mg/dL


 


Glucose    132 H  (74-99)  mg/dL


 


Calcium    8.2 L  (8.4-10.2)  mg/dL


 


Total Bilirubin    1.7 H  (0.2-1.3)  mg/dL


 


Total Protein    5.6 L  (6.3-8.2)  g/dL


 


Albumin    2.9 L  (3.5-5.0)  g/dL

## 2022-04-11 LAB
ALBUMIN SERPL-MCNC: 2.9 G/DL (ref 3.5–5)
ALP SERPL-CCNC: 66 U/L (ref 38–126)
ALT SERPL-CCNC: 14 U/L (ref 4–34)
ANION GAP SERPL CALC-SCNC: 5 MMOL/L
AST SERPL-CCNC: 27 U/L (ref 14–36)
BASOPHILS # BLD AUTO: 0 K/UL (ref 0–0.2)
BASOPHILS NFR BLD AUTO: 0 %
BUN SERPL-SCNC: 16 MG/DL (ref 7–17)
CALCIUM SPEC-MCNC: 8.3 MG/DL (ref 8.4–10.2)
CHLORIDE SERPL-SCNC: 105 MMOL/L (ref 98–107)
CO2 SERPL-SCNC: 28 MMOL/L (ref 22–30)
EOSINOPHIL # BLD AUTO: 0.3 K/UL (ref 0–0.7)
EOSINOPHIL NFR BLD AUTO: 3 %
ERYTHROCYTE [DISTWIDTH] IN BLOOD BY AUTOMATED COUNT: 2.65 M/UL (ref 3.8–5.4)
ERYTHROCYTE [DISTWIDTH] IN BLOOD: 19.4 % (ref 11.5–15.5)
GLUCOSE SERPL-MCNC: 113 MG/DL (ref 74–99)
HCT VFR BLD AUTO: 26.2 % (ref 34–46)
HGB BLD-MCNC: 8.1 GM/DL (ref 11.4–16)
LYMPHOCYTES # SPEC AUTO: 0.8 K/UL (ref 1–4.8)
LYMPHOCYTES NFR SPEC AUTO: 8 %
MCH RBC QN AUTO: 30.6 PG (ref 25–35)
MCHC RBC AUTO-ENTMCNC: 31 G/DL (ref 31–37)
MCV RBC AUTO: 98.8 FL (ref 80–100)
MONOCYTES # BLD AUTO: 0.5 K/UL (ref 0–1)
MONOCYTES NFR BLD AUTO: 5 %
NEUTROPHILS # BLD AUTO: 8.1 K/UL (ref 1.3–7.7)
NEUTROPHILS NFR BLD AUTO: 83 %
PLATELET # BLD AUTO: 230 K/UL (ref 150–450)
POTASSIUM SERPL-SCNC: 4.3 MMOL/L (ref 3.5–5.1)
PROT SERPL-MCNC: 5.6 G/DL (ref 6.3–8.2)
SODIUM SERPL-SCNC: 138 MMOL/L (ref 137–145)
WBC # BLD AUTO: 9.8 K/UL (ref 3.8–10.6)

## 2022-04-11 RX ADMIN — SPIRONOLACTONE SCH MG: 25 TABLET, FILM COATED ORAL at 09:14

## 2022-04-11 RX ADMIN — LACTULOSE SCH GM: 20 SOLUTION ORAL at 21:59

## 2022-04-11 RX ADMIN — IPRATROPIUM BROMIDE AND ALBUTEROL SULFATE SCH: .5; 3 SOLUTION RESPIRATORY (INHALATION) at 07:29

## 2022-04-11 RX ADMIN — SERTRALINE HYDROCHLORIDE SCH MG: 25 TABLET ORAL at 21:59

## 2022-04-11 RX ADMIN — LACTULOSE SCH GM: 20 SOLUTION ORAL at 09:14

## 2022-04-11 RX ADMIN — Medication SCH MG: at 06:58

## 2022-04-11 RX ADMIN — IPRATROPIUM BROMIDE AND ALBUTEROL SULFATE SCH: .5; 3 SOLUTION RESPIRATORY (INHALATION) at 19:42

## 2022-04-11 RX ADMIN — DOCUSATE SODIUM AND SENNOSIDES SCH EACH: 50; 8.6 TABLET ORAL at 09:14

## 2022-04-11 RX ADMIN — ENOXAPARIN SODIUM SCH MG: 40 INJECTION SUBCUTANEOUS at 09:14

## 2022-04-11 RX ADMIN — SERTRALINE HYDROCHLORIDE SCH MG: 100 TABLET ORAL at 21:59

## 2022-04-11 RX ADMIN — OXYCODONE HYDROCHLORIDE AND ACETAMINOPHEN SCH: 500 TABLET ORAL at 06:58

## 2022-04-11 RX ADMIN — DOCUSATE SODIUM AND SENNOSIDES SCH EACH: 50; 8.6 TABLET ORAL at 21:59

## 2022-04-11 RX ADMIN — CLOPIDOGREL BISULFATE SCH MG: 75 TABLET ORAL at 09:14

## 2022-04-11 RX ADMIN — ASPIRIN 81 MG CHEWABLE TABLET SCH MG: 81 TABLET CHEWABLE at 09:14

## 2022-04-11 RX ADMIN — DIGOXIN SCH MCG: 125 TABLET ORAL at 09:14

## 2022-04-11 RX ADMIN — BUDESONIDE AND FORMOTEROL FUMARATE DIHYDRATE SCH: 160; 4.5 AEROSOL RESPIRATORY (INHALATION) at 19:42

## 2022-04-11 RX ADMIN — ATORVASTATIN CALCIUM SCH MG: 40 TABLET, FILM COATED ORAL at 21:59

## 2022-04-11 RX ADMIN — LORATADINE SCH MG: 10 TABLET ORAL at 09:14

## 2022-04-11 RX ADMIN — BUDESONIDE AND FORMOTEROL FUMARATE DIHYDRATE SCH: 160; 4.5 AEROSOL RESPIRATORY (INHALATION) at 07:29

## 2022-04-11 RX ADMIN — PANTOPRAZOLE SODIUM SCH MG: 40 INJECTION, POWDER, FOR SOLUTION INTRAVENOUS at 09:15

## 2022-04-11 RX ADMIN — Medication SCH MG: at 17:09

## 2022-04-11 RX ADMIN — IPRATROPIUM BROMIDE AND ALBUTEROL SULFATE SCH: .5; 3 SOLUTION RESPIRATORY (INHALATION) at 15:19

## 2022-04-11 RX ADMIN — LACTULOSE SCH: 20 SOLUTION ORAL at 22:01

## 2022-04-11 RX ADMIN — LACTULOSE SCH: 20 SOLUTION ORAL at 09:16

## 2022-04-11 RX ADMIN — OXYCODONE HYDROCHLORIDE AND ACETAMINOPHEN SCH: 500 TABLET ORAL at 17:06

## 2022-04-11 RX ADMIN — IPRATROPIUM BROMIDE AND ALBUTEROL SULFATE SCH: .5; 3 SOLUTION RESPIRATORY (INHALATION) at 11:19

## 2022-04-11 NOTE — P.PN
Progress Note - Text


Progress Note Date: 04/11/22








Interval History:


Patient was seen resting in bed and was directable and agreeable to speak with 

writer in her room. Present in the room with her is her niece Eulalia and the 

patient is agreeable to having her present during the psychiatric evaluation.  

The patient is reporting that she is feeling better.  She remains future 

oriented and is looking forward to her recovery.  She reports that she will be 

going to rehab for her physical strength upon discharge.  She is currently not 

reporting any suicidal or homicidal ideation, intention, and social plan.  She 

is denying any auditory or visual hallucinations.  She reports no issues 

regarding her sleep or her appetite.  She has been adherent with her medication 

is not reporting any significant side effects.  





Mental Status Exam:


General Appearance: Patient appears to be stated age is alert, pleasant, and 

cooperative. Patient appears to have fair hygiene and grooming wearing hospital 

gown with intermittent eye contact.  


Behavior: Patient is calmly lying in bed without any agitated behavior.  Patient

laughs and smiles appropriately.


Speech: Patient's speech is fluent and nonpressured. 


Mood/Affect: Patient reports their mood is "much better", affect is congruent 

and bright


Suicidality/Homicidality:  Patient denies having any suicidal or homicidal 

ideation intent or plan.  


Perceptions: Patient denies any visual hallucinations and denies any auditory 

hallucinations  


Though content/process: There is no evidence of any delusional thought content 

and thought process is linear and goal-directed. 


Memory and concentration: AOX3, grossly intact for the purposes of this session.

Can spell "WORLD" backwards


Judgment and insight: Fair





                                   Vital Signs











Temp  98 F   04/11/22 08:00


 


Pulse  88   04/11/22 11:52


 


Resp  18   04/11/22 11:52


 


BP  103/69   04/11/22 11:52


 


Pulse Ox  98   04/11/22 11:52








                                 Intake & Output











 04/10/22 04/11/22 04/11/22





 18:59 06:59 18:59


 


Intake Total 120  0


 


Output Total 250 300 275


 


Balance -130 -300 -275


 


Intake:   


 


  Oral 120  0


 


Output:   


 


  Urine 250 300 275


 


  Stool  0 0


 


Other:   


 


  Voiding Method Incontinent Incontinent Incontinent





 External Catheter External Catheter External Catheter








                       ABP, PAP, CO, CI - Last Documented











Arterial Blood Pressure        107/66











                       Laboratory Results - Last 24 Hours











  04/11/22 04/11/22





  08:28 08:28


 


WBC  9.8 


 


RBC  2.65 L 


 


Hgb  8.1 L 


 


Hct  26.2 L 


 


MCV  98.8 


 


MCH  30.6 


 


MCHC  31.0 


 


RDW  19.4 H 


 


Plt Count  230 


 


MPV  8.3 


 


Neutrophils %  83 


 


Lymphocytes %  8 


 


Monocytes %  5 


 


Eosinophils %  3 


 


Basophils %  0 


 


Neutrophils #  8.1 H 


 


Lymphocytes #  0.8 L 


 


Monocytes #  0.5 


 


Eosinophils #  0.3 


 


Basophils #  0.0 


 


Hypochromasia  Marked 


 


Poikilocytosis  Slight 


 


Anisocytosis  Slight 


 


Macrocytosis  Moderate 


 


Sodium   138


 


Potassium   4.3


 


Chloride   105


 


Carbon Dioxide   28


 


Anion Gap   5


 


BUN   16


 


Creatinine   0.54


 


Est GFR (CKD-EPI)AfAm   >90


 


Est GFR (CKD-EPI)NonAf   >90


 


Glucose   113 H


 


Calcium   8.3 L


 


Total Bilirubin   1.9 H


 


AST   27


 


ALT   14


 


Alkaline Phosphatase   66


 


Total Protein   5.6 L


 


Albumin   2.9 L














Assessment


Major depressive disorder


Acute bereavement


-Patient's depressive disorder is likely precipitated and exacerbated by her 

deteriorating general medical health, recent loss of her , and recent 

life changes including relocation to [place].





Plan:


-Continue your medical management.





-At this time patient DOES NOT meet criteria for inpatient psychiatric 

admission.





-Delirium precautions recommended with patient including - avoiding use of 

narcotics and CNS sedatives, limit anticholinergic medications when possible, 

frequent re-orientation, minimize use of restraints, open window shades during 

the day and close them at night





-Would recommend the following medication changes/additions: 


Continue Zoloft 125 mg by mouth daily at bedtime for management of depression.  





-Brief supportive psychotherapy given.





-Psychiatry will sign off at this time.

## 2022-04-11 NOTE — P.PN
Subjective


Progress Note Date: 04/11/22


Principal diagnosis: 


 Non-STEMI





This is a pleasant 71-year-old female patient who has a history of hypertension,

hyperlipidemia, anxiety/depression, obesity, previous chronic tobacco 

dependence, chronic hypoxic respiratory failure from a COVID-19 pneumonia in 

2021.  Remains unvaccinated.  She also has a previous non-ST segment elevation 

myocardial infarction, cardiomyopathy with ejection fraction 25-30%, severe 

aortic stenosis.  She was recently admitted to [facility] for 

dizziness and nausea and had undergone cardiac catheterization which revealed a 

99% stenosis of the ostial RCA and transferred here for intervention.  

Unfortunately the lesion was too tight to cross and the procedure was aborted.  

She is being evaluated by surgical services for possible single-vessel bypass 

and aortic valve replacement.  She is seen today in consultation in the intensi

ve care unit and his pacing possible surgery.  She is currently sitting up in 

bed.  Awake and alert.  Anxious, teary-eyed.  She was found to have an FEV1 

value of 45% of predicted.  Currently, she denies any worsening shortness of 

breath, cough or congestion.  She is maintaining O2 saturations in the mid 90s 

on 2 L/m per nasal cannula.  She's been afebrile.  She has normal saline running

at 20 ML's per hour.  She remains on a heparin drip per weight base protocol.  

Computed tomography scan of the chest reveals moderate right-sided pleural 

effusion with some fluid in the fissure.  White count 7.1.  Hemoglobin 12.8.  

Sodium 135.  Potassium 4.0.  BUN 22.  Creatinine 0.65.  We'll switch 28.  AST 

21.  ALT 16.  Urinalysis negative.  Hepatitis screen negative.





The patient is seen today 04/02/2022 in follow-up in the ICU.  She is currently 

sitting up in bed.  Awake and alert in no acute distress.  Maintaining O2 

saturations in the 90s on 2 L/m per nasal cannula.  She has normal saline 

running 20 miles per hour.  Heparin per weight-based protocol.  She currently 

denies any worsening shortness of breath, cough or congestion.  No chest pain or

palpitations.  White count 7.4.  Hemoglobin 12.1.  Platelets 125.  Sodium 135.  

Potassium 3.7.  Bicarb 32.  BUN 22.  Creatinine 0.61.  She is continued on 

DuoNeb inhalations, Symbicort.  She remains on IV diuretics.  Currently in a -

1.1 L balance.





On 04/03/2022 patient seen in follow-up in intensive care unit, she is resting 

in bed, denies any chest pain, she is awake and alert, she is slightly tearful 

at the possibility of having to be transferred to a tertiary care facility 

because of being high risk for surgical intervention for coronary artery disease

with non-ST elevated myocardial infarct in this admission and status post 

unsuccessful PCI to the RCA, and severe aortic valve stenosis.  She is currently

on 2 L of oxygen breathing currently, pulse ox is 95-97%, hemodynamically she is

stable, not requiring any vasoactive drips, she is on heparin at weight-based 

protocol, point tenderness and a to 20 ML per hour.  CTA chest was completed 

showing no evidence for thoracic aortic aneurysm or dissection, cardiomegaly 

with small to moderate-sized right pleural effusion, and bilateral alveolar and 

interstitial edema, possibility of bilateral multifocal areas of acute 

infiltrate could not be completely excluded.  Patient continues on IV diuretics 

Lasix 40 mg every 8 hours, the patient is an +98 mL net fluid balance over the 

last 24 hours, trace s lower extremity edema although her weight is up by 2.7 kg

according to the recorded weights.  Patient currently continues on inhaled and 

nebulized bronchodilators, she is on home dose Coreg 3.125 mg twice daily, 

digoxin 125 mics daily, she is on Lipitor 40 mg, aspirin 81 mg, and Aldactone 25

mg.  Cardiology and CT surgery are closely following.  Today's labs have been 

reviewed, sodium is 133, potassium is 4.1, B1 is 21 creatinine 0.69.





On 04/11/2022 patient seen in follow-up on selective care unit.  She is resting 

comfortably in bed, denies any specific complaints, no shortness of breath or 

chest pain.  She is on 2 L of oxygen pulse ox is 98%.  Vital signs have been 

stable.  Blood pressure has been stable, breathing is nonlabored.  Chest x-ray 

from yesterday shows cardiomegaly with improved edema no new infiltration.  

Patient currently remains on Symbicort and DuoNeb.  No coughing or wheezing 

noted on today's exam.  She continues on aspirin 81 mg, Lipitor 40 mg, digoxin, 

Lovenox, Protonix.  Patient is on Plavix 75 mg daily.  CT surgery is following, 

with cardiology.  At this time the plan is TAVR in next couple weeks after 

patient shows increased strength and mobility and endurance after rehab 

placement 











Objective





- Vital Signs


Vital signs: 


                                   Vital Signs











Temp  98 F   04/11/22 08:00


 


Pulse  88   04/11/22 14:52


 


Resp  18   04/11/22 14:52


 


BP  103/69   04/11/22 11:52


 


Pulse Ox  98   04/11/22 11:52








                                 Intake & Output











 04/10/22 04/11/22 04/11/22





 18:59 06:59 18:59


 


Intake Total 120  0


 


Output Total 250 300 275


 


Balance -130 -300 -275


 


Intake:   


 


  Oral 120  0


 


Output:   


 


  Urine 250 300 275


 


  Stool  0 0


 


Other:   


 


  Voiding Method Incontinent Incontinent Incontinent





 External Catheter External Catheter External Catheter








                       ABP, PAP, CO, CI - Last Documented











Arterial Blood Pressure        107/66

















- Exam


 GENERAL EXAM: Alert, very pleasant, 71-year-old white female, resting in bed, 

on 2 L of oxygen pulse ox is 95-97% comfortable in no apparent distress.


HEAD: Normocephalic/atraumatic.


EYES: Normal reaction of pupils, equal size.  Conjunctiva pink, sclera white.


NOSE: Clear with pink turbinates.


THROAT: No erythema or exudates.


NECK: No masses, no JVD, no thyroid enlargement, no adenopathy.


CHEST: No chest wall deformity.  Symmetrical expansion. 


LUNGS: Equal air entry with diminished breath sounds at the bases


CVS: Regular rate and rhythm, normal S1 and S2, no gallops, no murmurs, no rubs


ABDOMEN: Soft, nontender.  No hepatosplenomegaly, normal bowel sounds, no 

guarding or rigidity.


EXTREMITIES: No clubbing, trace pretibial edema, chronic venous stasis changes 

in bilateral lower extremities no cyanosis, 2+ pulses and upper and lower 

extremities.


MUSCULOSKELETAL: Muscle strength and tone normal.


SPINE: No scoliosis or deformity


SKIN: No rashes


CENTRAL NERVOUS SYSTEM: Alert and oriented -3.  No focal deficits, tone is 

normal in all 4 extremities.


PSYCHIATRIC: Alert and oriented -3.  Appropriate affect.  Intact judgment and 

insight.











- Labs


CBC & Chem 7: 


                                 04/11/22 08:28





                                 04/11/22 08:28


Labs: 


                  Abnormal Lab Results - Last 24 Hours (Table)











  04/11/22 04/11/22 Range/Units





  08:28 08:28 


 


RBC  2.65 L   (3.80-5.40)  m/uL


 


Hgb  8.1 L   (11.4-16.0)  gm/dL


 


Hct  26.2 L   (34.0-46.0)  %


 


RDW  19.4 H   (11.5-15.5)  %


 


Neutrophils #  8.1 H   (1.3-7.7)  k/uL


 


Lymphocytes #  0.8 L   (1.0-4.8)  k/uL


 


Glucose   113 H  (74-99)  mg/dL


 


Calcium   8.3 L  (8.4-10.2)  mg/dL


 


Total Bilirubin   1.9 H  (0.2-1.3)  mg/dL


 


Total Protein   5.6 L  (6.3-8.2)  g/dL


 


Albumin   2.9 L  (3.5-5.0)  g/dL














Assessment and Plan


Plan: 


 Assessment:





#1.  Acute non-ST segment elevated myocardial infarction with 99% stenosis of 

the ostial RCA status post PCI to the RCA





#2.  Severe aortic stenosis,  awaiting TAVR possibly next couple weeks after 

medical optimization





#3.  Severe pulmonary hypertension





#4.  Ischemic cardiomyopathy with an ejection fraction of 25-30%





#5.  Acute on chronic systolic CHF with a right-sided pleural effusion





#6.  History of COVID-19 pneumonia in September 2021 requiring subsequent home 

oxygen at 2 L/m





#7.  COPD with an FEV1 value of 45% of predicted and an abnormal MVV





#8.  Morbid obesity





#9.  Hypertension





#10.  Hyperlipidemia





#11.  Anxiety and depression





#12.  Poor overall functional performance based on the above-mentioned multiple 

comorbidities





Plan:





Patient is breathing easier


Vital signs have been stable


Chest x-ray shows improved central vascular congestion and interstitial edema


Vital signs are stable


Anticipate possible transfer to [facility] possibly today or 

tomorrow


The plan is for the patient to have transcatheter aortic valve replacement 

possibly the next 2 weeks


Pulmonary service will sign off and follow on as-needed basis


We'll follow the patient during her hospitalization for TAVR procedure





I have personally seen and examined the patient, performed the documentation and

the assessment and  plan as written.  Number of minutes spent on the visit: [15]

 














Time with Patient: Less than 30

## 2022-04-11 NOTE — P.PN
Subjective


Progress Note Date: 04/11/22


Principal diagnosis: 





Coronary artery disease with 99% RCA stenosis, non-STEMI this admission, status 

post successful PCI with AMADO to the right coronary artery, severe aortic valve 

stenosis, trace mitral valve regurgitation with mild mitral stenosis, dilated 

cardiomyopathy, acute on chronic systolic heart failure.  Previous medical 

history of hypertension, hyperlipidemia, chronic hypoxic respiratory failure on 

2 L nasal cannula home oxygen, COVID-19 pneumonia in September 2021,  remains 

unvaccinated for COVID-19, morbid obesity, depression, remote history of 

nicotine dependence, severe restrictive lung disease 





The patient was seen and examined this morning sitting up in bed on the cardiac 

stepdown unit.  She denies any chest pain, denies worsening of shortness of 

breath.  Patient remains with marginal blood pressure, anemic, and very 

debilitated.  Plan is for discharge to rehab to increase strength and endurance,

allow recovery from acute heart failure and improve anemia, then to return in 

the next couple of weeks for TAVR. This is agreeable to the patient.





Objective





- Vital Signs


Vital signs: 


                                   Vital Signs











Temp  98.1 F   04/11/22 04:00


 


Pulse  81   04/11/22 04:00


 


Resp  16   04/11/22 04:00


 


BP  90/63   04/11/22 04:00


 


Pulse Ox  99   04/11/22 04:00








                                 Intake & Output











 04/10/22 04/11/22 04/11/22





 18:59 06:59 18:59


 


Intake Total 120  


 


Output Total 250 300 


 


Balance -130 -300 


 


Intake:   


 


  Oral 120  


 


Output:   


 


  Urine 250 300 


 


  Stool  0 


 


Other:   


 


  Voiding Method Incontinent Incontinent 





 External Catheter External Catheter 








                       ABP, PAP, CO, CI - Last Documented











Arterial Blood Pressure        107/66

















- Exam





CONSTITUTIONAL: Appears calm, cooperative


RESPIRATORY:  Lungs sounds diminished bilaterally.  Respirations even, 

nonlabored.  Currently on 2 L nasal cannula with oxygen saturation 98%


CARDIOVASCULAR:  S1, S2 present, systolic murmur present.  Regular rate and 

rhythm, sinus rhythm on telemetry.  Palpable lower extremity pulses bilaterally.

 Lower extremity edema present.  No calf pain or tenderness noted.  


GASTROINTESTINAL:  Abdomen soft, nontender, nondistended.  Active bowel sounds 

present 4 quadrants.  Tolerating diet


GENITOURINARY:  Continues to void 


INTEGUMENTARY:  Skin is warm and dry with evidence of good perfusion.  


NEUROLOGIC:  Cranial nerves II through XII intact


MUSKULOSKELETAL:  Able to move all extremities, strength equal but weak 

bilaterally


PSYCHIATRIC:  Alert and oriented to person place and time, depressed/flat affect








- Allied health notes


Allied health notes reviewed: nursing





- Labs


CBC & Chem 7: 


                                 04/10/22 07:53





                                 04/10/22 07:53


Labs: 


                  Abnormal Lab Results - Last 24 Hours (Table)











  04/10/22 04/10/22 Range/Units





  07:53 07:53 


 


RBC  2.52 L   (3.80-5.40)  m/uL


 


Hgb  7.6 L   (11.4-16.0)  gm/dL


 


Hct  24.6 L   (34.0-46.0)  %


 


MCHC  30.9 L   (31.0-37.0)  g/dL


 


RDW  19.2 H   (11.5-15.5)  %


 


Neutrophils #  8.2 H   (1.3-7.7)  k/uL


 


Lymphocytes #  0.8 L   (1.0-4.8)  k/uL


 


Creatinine   0.47 L  (0.52-1.04)  mg/dL


 


Glucose   132 H  (74-99)  mg/dL


 


Calcium   8.2 L  (8.4-10.2)  mg/dL


 


Total Bilirubin   1.7 H  (0.2-1.3)  mg/dL


 


Total Protein   5.6 L  (6.3-8.2)  g/dL


 


Albumin   2.9 L  (3.5-5.0)  g/dL














Assessment and Plan


Assessment: 





1.  Coronary artery disease with 99% RCA stenosis, non-STEMI this admission, 

status post successful PCI with AMADO to the right coronary artery


2.  Severe aortic valve stenosis, peak/mean gradient 78.27/47.94 mmHg


3.  Trace mitral regurgitation with mild mitral stenosis on transthoracic echoca

rdiogram, peak/mean gradients of 14.48/6.4 mmHg, 


4.  Dilated cardiomyopathy


5.  Acute on chronic systolic heart failure, EF 25-30% on transthoracic 

echocardiogram


6.  History of hypertension, currently hypotensive


7.  Hyperlipidemia, cholesterol 333,  


8.  Chronic hypoxic respiratory failure on 2 L nasal cannula home oxygen


9.  COVID-19 pneumonia in September 2021


10.  Remains unvaccinated for COVID-19


11.  Morbid obesity


12.  Depression, currently on Zoloft


13.  Remote history of nicotine dependence


14.  Severe restrictive lung disease, FEV1 45% of predicted 


15.  Generalized debility, patient uses walker for ambulation


16.  Acute blood loss anemia


Plan: 





1.  Continue aspirin, beta blocker, statin


2.  Continue diuresis, heart failure management per cardiology


3.  Increase activity, ambulate as tolerated


4.  OK to discharge to rehab to increase strength, mobility, endurance and to 

allow recovery from acute events


5.  Will contact patient to set up TAVR in next couple of weeks


6.  Medical management of other comorbidities per primary care service

## 2022-04-11 NOTE — P.PN
Subjective


This is a 71 year old female with a past medical history of NSTEMI this 

admission, Coronary artery disease with 99% RCA stenosis status post successful 

PCI with AMADO to the right coronary artery 4/7/22,  cardiomyopathy with EF 15-

20%, severe aortic valve stenosis (plan for TAVR), trace mitral valve 

regurgitation with mild mitral stenosis, dilated cardiomyopathy, Chronic 

systolic heart failure, hypertension, hyperlipidemia[facility], obesity, depression, former smoker, and severe restrictive lung 

disease. She follows with Dr. Haynes. We are following patient for NSTEMI. 





4/7/2022 patient underwent cardiac catheterization with Dr. Jacobs on 4/7/22 

which revealed





4/11/2022


Patient seen and examined at bedside. Lying in bed comfortably. Denies chest 

pain or shortness of breath. Patient is working with PT/OT today. Plan is for 

discharge to rehab to increase strength and endurance, allow recovery from 

NSTEMI and acute heart failure and return in the next couple of weeks for TAVR. 





Patient currently maintained on aspirin 81mg daily, atorvastatin 40 mg nightly, 

coronary artery 0.125 g twice a day, Plavix 75 mg daily, digoxin 125mcg daily, 

spironolactone 25mg daily 





GENERAL: In no acute distress. Debilitated. 


NECK: Supple without JVD or thyromegaly.


LUNGS: Breath sounds diminished in bases to auscultation bilaterally. 

Respiration equal and unlabored.  No wheezes, rales or rhonchi.


HEART: Regular rate and rhythm without murmurs, rubs or gallops. S1 and S2 

heard.


EXTREMITIES: Normal range of motion, no edema.  No clubbing or cyanosis. 

Peripheral pulses intact.





ASSESSMENT


Acute on chronic heart failure with reduced ejection fraction


Non-STEMI s/p PCI to RCA 


Coronary artery disease including 99% RCA stenosis status post failed attempted 

PCI


Hypertension


Hyperlipidemia


Cardiomyopathy mainly nonischemic with CAD out of proportion to cardiomyopathy


Severe aortic stenosis


Mitral regurgitation


Oxygen dependence status post COVID-19 infection





PLAN


We will continue dual antiplatelet therapy with aspirin and Plavix


Continue statin, Coreg, digoxin, spironolactone


Patient not on ACEI/ARB due to hypotension


Increase activity as tolerated 


From a cardiology perspective, patient is stable to be discharged to rehab.


Follow up outpatient and TAVR will be done as an outpatient in the next couple 

of weeks. 





Nurse practitioner note has been reviewed by physician. Signing provider agrees 

with the documented findings, assessment, and plan of care. 





Objective





- Vital Signs


Vital signs: 


                                   Vital Signs











Temp  98 F   04/11/22 08:00


 


Pulse  77   04/11/22 08:00


 


Resp  18   04/11/22 08:00


 


BP  90/59   04/11/22 08:00


 


Pulse Ox  98   04/11/22 08:00








                                 Intake & Output











 04/10/22 04/11/22 04/11/22





 18:59 06:59 18:59


 


Intake Total 120  0


 


Output Total 250 300 


 


Balance -130 -300 0


 


Intake:   


 


  Oral 120  0


 


Output:   


 


  Urine 250 300 


 


  Stool  0 


 


Other:   


 


  Voiding Method Incontinent Incontinent 





 External Catheter External Catheter 








                       ABP, PAP, CO, CI - Last Documented











Arterial Blood Pressure        107/66

















- Labs


CBC & Chem 7: 


                                 04/11/22 08:28





                                 04/11/22 08:28


Labs: 


                  Abnormal Lab Results - Last 24 Hours (Table)











  04/11/22 04/11/22 Range/Units





  08:28 08:28 


 


RBC  2.65 L   (3.80-5.40)  m/uL


 


Hgb  8.1 L   (11.4-16.0)  gm/dL


 


Hct  26.2 L   (34.0-46.0)  %


 


RDW  19.4 H   (11.5-15.5)  %


 


Neutrophils #  8.1 H   (1.3-7.7)  k/uL


 


Lymphocytes #  0.8 L   (1.0-4.8)  k/uL


 


Glucose   113 H  (74-99)  mg/dL


 


Calcium   8.3 L  (8.4-10.2)  mg/dL


 


Total Bilirubin   1.9 H  (0.2-1.3)  mg/dL


 


Total Protein   5.6 L  (6.3-8.2)  g/dL


 


Albumin   2.9 L  (3.5-5.0)  g/dL

## 2022-04-11 NOTE — P.PN
Subjective


Progress Note Date: 04/11/22





HISTORY OF PRESENT ILLNESS


This is a 71-year-old female with past medical history of dilated cardiomyopathy

with ejection fraction of 15-20%, severe aortic valve stenosis, mild aortic 

regurgitation with moderate to severe mitral regurgitation, chronic systolic 

heart failure, hyperlipidemia, obesity with possible obstructive sleep apnea and

obesity hypoventilation syndrome, chronic hypoxic respiratory failure on home O2

at 2 L nasal cannula, hypertension hypertensive cardio vascular disease, 

previous Covid 19 infection.  Patient presented to [facility] 

slight elevation of troponin, EKG showed changes suggestive ischemic changes and

was started on aspirin, heparin drip and admitted to the hospital.  Patient was 

seen by cardiology. She was supposed to have a heart catheterization done as an 

outpatient along with [facility] for possible aortic valve replacement and mitral valve

and possible CABG.  Patient underwent coronary angiography finding right 

dominant vessel, 99% ostial stenosis, aortic pressure is 100/70, 40 mm gradient 

across the aortic valve.  Left ventricular end diastolic pressure is 2530.  

Patient was transferred to [facility] for PTCA and stent 

placement of the RCA which was unsuccessful.  Consult with cardiothoracic 

surgery for CABG and valve repair/replacement.





4/1: Patient is seen today in the intensive care unit.  She denies chest pain or

shortness of breath. She complains of anxiety and decreased appetite. Less cough

today.She has been seen by intensivist and cardiothoracic surgery, currently on 

heparin drip, consult was added for dental evaluation and clearance and 

panoramic CT.  Patient has been afebrile, heart rate in the 80s, blood pressure 

101/73, pulse ox 96%.  Repeat blood work reveals CBC is unremarkable.  Potassium

4.0.  Urinalysis negative for infection.  Hepatitis panel negative.  MRSA nasal 

screen is in process.  CT of the chest revealed cardiomegaly with suspected 

pulmonary edema and moderate to marked right sided pleural effusion.  Associated

infection can't be excluded.


Echocardiogram reveals EF of 25-30% with mild concentric left ventricular 

hypertrophy, severe global hypokinesia of the LV, severe aortic stenosis with 

gradient 78.27 mm a new 3/47.94 mmHg, trace mitral regurgitation, mild mitral 

stenosis, mild tricuspid regurgitation, mild pulmonary hypertension, RVSP 43.33 

mmHg.


Carotid ultrasound revealed less than 50% stenosis bilateral carotid systems.





4/2: Patient is sitting up in bed she underwent CTA of the chest for evaluation 

of dissection of the right coronary artery, she denies any chest pain at this 

time, she has no shortness breath, she has no abdominal pain, nausea or 

vomiting, she continues to have some coughing noted from production, she 

continues to be somewhat depressed and anxious and she is not sure what to do, 

she is missing her daughter she stated and she doesn't think her daughter is 

able to come and see her.





4/3: Patient is sitting on the bed in no apparent distress, she continues to be 

quite depressed and anxious about her situation, she has not made up her mind, I

had a long conversation with Dr. Jacobs regarding the plan to pursue the [facility] care at this time, she was deemed high surgical risk per cardiovascular 

surgery service at the current [place], and she would benefit from a 

transcatheter aortic valve replacement plus reattempted PCI of the RCA, her CT 

angiography of the chest did not show evidence of any dissection of the thoracic

aorta , patient is maintained on Zoloft 100 mg every day, she is mentioned in 

ICU, and the plan to the PCI hopefully in the next 24 hours patient and her 

daughter Janneth are in agreement for conservative approach and they understand the

risks of the procedure and that she will have the TAVR at a later date or during

this hospital admission depends on her symptoms, we will consult Psychiatry for 

depression and possible bipolar disorder.





4/4: Patient remains in intensive care unit, afebrile, heart rate in the 80s, 

blood pressure 101/58, pulse ox 97% on room air.  Patient denies having any 

chest pain but continues to have anxiety issues.  Consult with psychiatry is 

pending.  Patient has been scheduled for CT TAVR protocol for today.  Repeat 

blood work reveals WBC 15, hemoglobin 8.6, platelet count 172.  Sodium 134, BUN 

41 creatinine 1.2.  Blood sugar 149.  Total bilirubin 1.4 otherwise liver 

function tests are normal.  Nasal MRSA screen is negative.





4/5: Patient remains in the intensive care unit.  Patient underwent CAT scan 

yesterday for TAVR protoxol and was found to have moderate size right pelvic 

retroperitoneal hematoma extending into the lower abdomen presumed from att

empted right groin catheter insertion.  Consult was added for vascular surgery 

with plan to continue supportive care, no plan for surgical intervention.  

Heparin was discontinued.  Patient was transfused 1 unit of packed RBCs for 

hemoglobin of 6.9 with repeat hemoglobin of 7.6.


Patient has been afebrile, heart rate 70, blood pressure 109/62, pulse ox 97%.  

Cardiac monitor is sinus rhythm.  Patient has also been seen and followed by 

psychiatry for depression and acute bereavement with the loss of her , 

recommendations Zoloft 125 mg daily at bedtime.





4/6: Patient remains in the intensive care unit.  No signs of bleeding and no 

worsening hematoma with hemoglobin stable at 7.6. Vascular surgery has cleared 

patient to resume heparin if needed, continue to monitor for bleeding and daily 

CBC.  Plan is for Dr. Jacobs will proceed with intervention tomorrow.  Patient

is also followed by intensivist, cardiothoracic surgery and psychiatry.





4/7: Patient is status post stent in the RCA today with Dr. Jacobs.  She is 

seen postprocedure and is comfortable.  She denies having any chest pain.  

Anxiety is controlled.  Approach was from the left groin, no bleeding or 

hematoma noted.  Patient remains afebrile, heart rate in the 80s, blood pressure

92/57, pulse ox 94% on 2 L nasal cannula.  Repeat hemoglobin is stable at 7.5, 

WBC 11.5.  Electrolytes are normal, BUN 21 creatinine 0.61.





4/8: Patient remains in the intensive care unit status post stent of the RCA 

yesterday.  Patient has moderate to severe LAD lesion with significant FFR to be

addressed at a later time as well asTAVR will be planned for the next 1-2 weeks.

 No hematoma to the left groin.  Patient is waiting for a bed on the cardiac 

stepdown unit. Vascular surgeries following for retroperitoneal hematoma that 

does not appear to be actively bleeding, plan for supportive care.  Patient 

remains afebrile heart rate 85, blood pressure 96/54, pulse ox 98% on room air..

 BUN 19 and creatinine 0.62.  Anticipate that patient will be able to work with 

PT and OT determine home care versus subacute rehab needs.





4/9: Patient was moved out of the ICU to a telemetry unit, she is in a better 

spurs, she responded very well to the sertraline 125 mg at bedtime, she is 

continued to have minimal cough no phlegm production, she is using Flonase and 

Claritin for postnasal drip, she denies any abdominal pain, she has not had a 

bowel movement attempted we will start the patient on lactulose 20 g orally 

twice every day, start the patient on Senokot 2 tablets at bedtime, follow-up 

with the patient very closely, we'll discuss with cardiology and cardiothoracic 

surgery team whether the patient would have TAVR next week. 





4/10: Patient sitting up in bed that she is feeling better today she continues 

to have significant constipation, she has not had a bowel movement despite all 

the medications, physical therapy will see the patient move the patient on, 

patient will likely be transferred to subacute rehabilitation initially 4 hours 

if there is no plan to doTAVR this coming week. 





4/11: Patient is seen today on the cardiac stepdown unit.  She denies having any

chest pain at this time but states she had a little bit of pressure in her chest

when she was getting back into bed.  She also states she has some cramping in 

her right leg.  She did work with physical therapy this morning and the 

recommendations are subacute rehab.  Patient is having prune juice now has 

started had MiraLAX but no bowel movement.  Patient's been afebrile, heart rate 

in 70s and 80s, blood pressure 103/69, pulse ox 98% on 2 L nasal cannula.  Blood

work reveals WBC 9.8, hemoglobin 8.1, platelet count 230.  Electrolytes are 

normal.  BUN 16 creatinine 0.54.  Total bilirubin 1.9.  Other liver function 

tests are normal.  Patient is ready for discharge to subacute rehab but we will 

hold as plan is not in place yet and patient is having ongoing constipation.  

Plan for discharge to [facility] tomorrow once arrangements are completed.  Patient

is not appropriate for inpatient rehab.








REVIEW OF SYSTEMS


Constitutional: No fever, no chills, no night sweats.  No weight change.  

Positive for generalized weakness, positive for fatigue or lethargy.  No daytime

sleepiness.


HEENT: No headache.  No blurred vision or double vision, no loss of vision.  No 

loss of Hearing, no ringing in the ears, no dizziness.  No nasal drainage or 

congestion.  No epistaxis.  No sore throat.


Lungs: Positive for shortness of breath, positive for cough, no sputum 

production.  No wheezing.  Reports dyspnea on exertion.


Cardiovascular: No chest pain, no lower extremity edema.  No palpitations.  No 

paroxysmal nocturnal dyspnea.  No orthopnea.  No lightheadedness or dizziness.  

No syncopal episodes.


Abdominal: No right lower quadrant abdominal pain.  No nausea, vomiting.  No 

diarrhea.  No constipation.  No bloody or tarry stools.  Reports loss of 

appetite.


Genitourinary: No dysuria, increased frequency, urgency.  No urinary retention.


Musculoskeletal: No myalgias.  Generalized muscle weakness, reported gait 

dysfunction requiring significant assistance, no frequent falls.  Positive for 

back pain.  No neck pain.


Integumentary: No wounds, no lesions.  No rash or pruritus.  Positive for 

bruising.  No change in hair or nails.


Neurologic: No aphasia. No facial droop. No change in mentation. No head injury.

No headache. No paralysis. No paresthesia.


Psychiatric: Positive for depression.  Reports anxiety.  Positive for mood 

swings


Endocrine: No abnormal blood sugars.  Positive for weight change.   





PHYSICAL EXAMINATION


Gen: This is a 71-year-old  female.


HEENT: Head is atraumatic, normocephalic. Pupils equal, round. Sclerae is 

anicteric. 


NECK: Supple. No JVD. No lymphadenopathy. No thyromegaly. 


LUNGS: Decreased breath sounds at the bases, decreased tactile fremitus at the 

right lower third, few rhonchi, no expiratory wheeze, no chest wall tenderness. 

No intercostal retractions.


HEART: First heart sound is depressed, second heart sound is normal, systolic 

ejection murmur 3/6 at the right upper sternal border radiating to the base of 

the neck, systolic ejection murmur 2/6 at the apex rating to the axilla.


ABDOMEN: Soft, mild tenderness right lower quadrant, nondistended, positive 

bowel sounds, multiple ecchymosis.


EXTREMITIES: 1+ pedal edema.  No calf tenderness.  Dorsalis pedis +1 

bilaterally. 


NEUROLOGICAL: Patient is awake, alert and oriented x3. Cranial nerves 2 through 

12 are grossly intact.,  Cranial nerves II-12 appear grossly intact, muscle 

power 4 out of 5 in upper and lower extremities bilaterally.





ASSESSMENT AND PLAN





1.   Non ST elevation  myocardial infarction with history of prior dilated 

cardiomyopathy status post coronary angiography finding right dominant vessel, 

99% ostial stenosis, aortic pressure is 100/70, 40 mm gradient across the aortic

valve.  Left ventricular end diastolic pressure is 2530.  Patient was 

transferred to [facility] for PTCA and stent placement of the 

RCA which was unsuccessful.  Consult with cardiothoracic surgery appreciated.  

Intensivist consultation appreciated.  Continue aspirin 81 mg daily, PLAVIX 75 

MG orally daily, Coreg 6.25 mg twice daily,continue Atorvastatin 40 mg orally 

daily, now with successful   Stent of the RCA with Dr. Jacobs 4/7. LAD 

stenosis to be addressed at a later time.





2.  Dilated cardiomyopathy.  continue patient on carvedilol 3.125 mg orally twic

e every day, digoxin 125 g daily, we will continue with spironolactone 25 mg 

orally daily .





3.  Severe aortic valve stenosis with mild aortic regurgitation.  TAVR to be 

scheduled in the near future.





4.  Moderate to Severe mitral regurgitation. likely will continue with 

conservative management no plan for mitral valve repair. 





5.  Hyperlipidemia.   She was started on atorvastatin 40 mg once every day.





6.  Obesity with obstructive sleep apnea and obesity hypoventilation syndrome.  

Patient has not had sleep study done yet. 





7.  Chronic hypoxic respiratory failure secondary to his Covid 19 and underlying

COPD and possible obstructive sleep apnea and hypoventilation syndrome.  Patient

is normally on 2 L nasal cannula.





8.  Acute on chronic systolic heart failure.  Off Lasix, continue Coreg, 

Aldactone 25 mg daily.  





9.  Hypertension, hypertensive cardiovascular disease. continue carvedilol 3.125

mg orally twice every day patient is not on ACE-I or ARB due to severe 

hypertension,  





10.  Recurrent depression and generalized anxiety disorder.  Consult consult 

with psychiatry appreciated.  Patient on Zoloft 125 mg at bedtime


 


11.  DVT prophylaxis. we will continue with Lovenox 40 mg SC daily 





12.  GI  prophylaxis.  Protonix 40 mg IV push daily. 





13.  Acute retroperitoneal hematoma with acute blood loss anemia status post 

transfusion 1 unit of packed RBCs.  Consult with vascular surgery appreciated. 

No plan for any surgical interventions.  Monitor closely for bleeding.





14. constipation.  Start the patient on Senokot 2 tablet at bedtime along with 

lactulose 20 g orally twice every day





Full code.








DIS[place] PLAN


[place]





Impression and plan of care have been directed as dictated by the signing 

physician.  Charla Guevara nurse practitioner acting as scribe for signing 

physician.





Objective





- Vital Signs


Vital signs: 


                                   Vital Signs











Temp  98 F   04/11/22 08:00


 


Pulse  88   04/11/22 11:52


 


Resp  18   04/11/22 11:52


 


BP  103/69   04/11/22 11:52


 


Pulse Ox  98   04/11/22 11:52








                                 Intake & Output











 04/10/22 04/11/22 04/11/22





 18:59 06:59 18:59


 


Intake Total 120  0


 


Output Total 250 300 0


 


Balance -130 -300 0


 


Intake:   


 


  Oral 120  0


 


Output:   


 


  Urine 250 300 


 


  Stool  0 0


 


Other:   


 


  Voiding Method Incontinent Incontinent Incontinent





 External Catheter External Catheter External Catheter








                       ABP, PAP, CO, CI - Last Documented











Arterial Blood Pressure        107/66

















- Labs


CBC & Chem 7: 


                                 04/11/22 08:28





                                 04/11/22 08:28


Labs: 


                  Abnormal Lab Results - Last 24 Hours (Table)











  04/11/22 04/11/22 Range/Units





  08:28 08:28 


 


RBC  2.65 L   (3.80-5.40)  m/uL


 


Hgb  8.1 L   (11.4-16.0)  gm/dL


 


Hct  26.2 L   (34.0-46.0)  %


 


RDW  19.4 H   (11.5-15.5)  %


 


Neutrophils #  8.1 H   (1.3-7.7)  k/uL


 


Lymphocytes #  0.8 L   (1.0-4.8)  k/uL


 


Glucose   113 H  (74-99)  mg/dL


 


Calcium   8.3 L  (8.4-10.2)  mg/dL


 


Total Bilirubin   1.9 H  (0.2-1.3)  mg/dL


 


Total Protein   5.6 L  (6.3-8.2)  g/dL


 


Albumin   2.9 L  (3.5-5.0)  g/dL

## 2022-04-12 RX ADMIN — ATORVASTATIN CALCIUM SCH MG: 40 TABLET, FILM COATED ORAL at 21:58

## 2022-04-12 RX ADMIN — SERTRALINE HYDROCHLORIDE SCH: 25 TABLET ORAL at 04:18

## 2022-04-12 RX ADMIN — Medication SCH MG: at 06:42

## 2022-04-12 RX ADMIN — IPRATROPIUM BROMIDE AND ALBUTEROL SULFATE SCH: .5; 3 SOLUTION RESPIRATORY (INHALATION) at 08:00

## 2022-04-12 RX ADMIN — OXYCODONE HYDROCHLORIDE AND ACETAMINOPHEN SCH: 500 TABLET ORAL at 16:57

## 2022-04-12 RX ADMIN — ASPIRIN 81 MG CHEWABLE TABLET SCH MG: 81 TABLET CHEWABLE at 15:08

## 2022-04-12 RX ADMIN — ATORVASTATIN CALCIUM SCH: 40 TABLET, FILM COATED ORAL at 04:18

## 2022-04-12 RX ADMIN — IPRATROPIUM BROMIDE AND ALBUTEROL SULFATE SCH: .5; 3 SOLUTION RESPIRATORY (INHALATION) at 11:23

## 2022-04-12 RX ADMIN — SERTRALINE HYDROCHLORIDE SCH MG: 25 TABLET ORAL at 19:51

## 2022-04-12 RX ADMIN — PANTOPRAZOLE SODIUM SCH MG: 40 INJECTION, POWDER, FOR SOLUTION INTRAVENOUS at 12:05

## 2022-04-12 RX ADMIN — METOCLOPRAMIDE SCH: 5 INJECTION, SOLUTION INTRAMUSCULAR; INTRAVENOUS at 16:57

## 2022-04-12 RX ADMIN — ENOXAPARIN SODIUM SCH MG: 40 INJECTION SUBCUTANEOUS at 15:09

## 2022-04-12 RX ADMIN — LACTULOSE SCH: 20 SOLUTION ORAL at 12:12

## 2022-04-12 RX ADMIN — IPRATROPIUM BROMIDE AND ALBUTEROL SULFATE SCH: .5; 3 SOLUTION RESPIRATORY (INHALATION) at 15:20

## 2022-04-12 RX ADMIN — METOCLOPRAMIDE SCH MG: 5 INJECTION, SOLUTION INTRAMUSCULAR; INTRAVENOUS at 15:08

## 2022-04-12 RX ADMIN — CLOPIDOGREL BISULFATE SCH MG: 75 TABLET ORAL at 15:06

## 2022-04-12 RX ADMIN — DIGOXIN SCH MCG: 125 TABLET ORAL at 15:08

## 2022-04-12 RX ADMIN — Medication SCH: at 16:57

## 2022-04-12 RX ADMIN — LORATADINE SCH MG: 10 TABLET ORAL at 21:58

## 2022-04-12 RX ADMIN — BUDESONIDE AND FORMOTEROL FUMARATE DIHYDRATE SCH: 160; 4.5 AEROSOL RESPIRATORY (INHALATION) at 19:29

## 2022-04-12 RX ADMIN — LACTULOSE SCH: 20 SOLUTION ORAL at 21:59

## 2022-04-12 RX ADMIN — SPIRONOLACTONE SCH MG: 25 TABLET, FILM COATED ORAL at 15:08

## 2022-04-12 RX ADMIN — DOCUSATE SODIUM AND SENNOSIDES SCH: 50; 8.6 TABLET ORAL at 04:18

## 2022-04-12 RX ADMIN — OXYCODONE HYDROCHLORIDE AND ACETAMINOPHEN SCH: 500 TABLET ORAL at 06:40

## 2022-04-12 RX ADMIN — DOCUSATE SODIUM AND SENNOSIDES SCH: 50; 8.6 TABLET ORAL at 21:59

## 2022-04-12 RX ADMIN — LORATADINE SCH: 10 TABLET ORAL at 15:09

## 2022-04-12 RX ADMIN — BUDESONIDE AND FORMOTEROL FUMARATE DIHYDRATE SCH: 160; 4.5 AEROSOL RESPIRATORY (INHALATION) at 08:00

## 2022-04-12 RX ADMIN — SERTRALINE HYDROCHLORIDE SCH MG: 100 TABLET ORAL at 19:51

## 2022-04-12 RX ADMIN — DOCUSATE SODIUM AND SENNOSIDES SCH: 50; 8.6 TABLET ORAL at 12:12

## 2022-04-12 RX ADMIN — SERTRALINE HYDROCHLORIDE SCH: 100 TABLET ORAL at 04:18

## 2022-04-12 RX ADMIN — IPRATROPIUM BROMIDE AND ALBUTEROL SULFATE SCH: .5; 3 SOLUTION RESPIRATORY (INHALATION) at 19:29

## 2022-04-12 NOTE — P.PN
Subjective


This is a 71 year old female with a past medical history of NSTEMI this 

admission, Coronary artery disease with 99% RCA stenosis status post successful 

PCI with AMADO to the right coronary artery 4/7/22,  cardiomyopathy with EF 15-

20%, severe aortic valve stenosis (plan for TAVR), trace mitral valve 

regurgitation with mild mitral stenosis, dilated cardiomyopathy, Chronic 

systolic heart failure, hypertension, hyperlipidemia, COVID-19 pneumonia in [place]

eptember 2021, obesity, depression, former smoker, and severe restrictive lung 

disease. She follows with Dr. Haynes. We are following patient for NSTEMI. 





4/7/2022 patient underwent cardiac catheterization with Dr. Jacobs on 4/7/22 

which revealed





4/12/2022


Patient seen and examined at bedside. Lying in bed comfortably. She endorses 

some abdominal pain and nausea and constipation. She is on a bowel regimen. 

Denies chest pain or shortness of breath. Continues to be hypotensive BP 97/56 

HR 80s.  Patient is working with PT/OT today. Plan is for discharge to rehab to 

increase strength and endurance, allow recovery from NSTEMI and acute heart 

failure and return in the next couple of weeks for TAVR. 





Patient refusing meds this morning 


Patient currently maintained on aspirin 81mg daily, atorvastatin 40 mg nightly, 

Coreg 3.125mg twice a day, Plavix 75 mg daily, digoxin 125mcg daily, 

spironolactone 25mg daily 





GENERAL: In no acute distress. Debilitated. 


NECK: Supple without JVD or thyromegaly.


LUNGS: Breath sounds diminished in bases to auscultation bilaterally. 

Respiration equal and unlabored.  No wheezes, rales or rhonchi.


HEART: Regular rate and rhythm without murmurs, rubs or gallops. S1 and S2 

heard.


EXTREMITIES: Normal range of motion, no edema.  No clubbing or cyanosis. 

Peripheral pulses intact.





ASSESSMENT


Acute on chronic heart failure with reduced ejection fraction


Non-STEMI s/p PCI to RCA 


Coronary artery disease including 99% RCA stenosis status post failed attempted 

PCI


Hypertension


Hyperlipidemia


Cardiomyopathy mainly nonischemic with CAD out of proportion to cardiomyopathy


Severe aortic stenosis


Mitral regurgitation


Oxygen dependence status post COVID-19 infection





PLAN


We will continue dual antiplatelet therapy with aspirin and Plavix


Continue statin, Coreg, digoxin, spironolactone


Patient not on ACEI/ARB due to hypotension


Increase activity as tolerated 


From a cardiology perspective, patient is stable to be discharged to rehab.


Follow up outpatient and TAVR will be done as an outpatient in the next couple 

of weeks. 





Nurse practitioner note has been reviewed by physician. Signing provider agrees 

with the documented findings, assessment, and plan of care. 





Objective





- Vital Signs


Vital signs: 


                                   Vital Signs











Temp  98.8 F   04/12/22 12:00


 


Pulse  87   04/12/22 12:00


 


Resp  18   04/12/22 12:00


 


BP  78/44   04/12/22 12:00


 


Pulse Ox  99   04/12/22 12:00








                                 Intake & Output











 04/11/22 04/12/22 04/12/22





 18:59 06:59 18:59


 


Intake Total 0  118


 


Output Total 400 0 750


 


Balance -400 0 -632


 


Weight 111.9 kg  


 


Intake:   


 


  Oral 0  118


 


Output:   


 


  Urine 400  750


 


  Stool 0 0 


 


Other:   


 


  Voiding Method Incontinent Incontinent 





 External Catheter External Catheter 








                       ABP, PAP, CO, CI - Last Documented











Arterial Blood Pressure        107/66

















- Labs


CBC & Chem 7: 


                                 04/11/22 08:28





                                 04/11/22 08:28

## 2022-04-12 NOTE — P.PN
Subjective


Progress Note Date: 04/12/22





HISTORY OF PRESENT ILLNESS


This is a 71-year-old female with past medical history of dilated cardiomyopathy

with ejection fraction of 15-20%, severe aortic valve stenosis, mild aortic 

regurgitation with moderate to severe mitral regurgitation, chronic systolic 

heart failure, hyperlipidemia, obesity with possible obstructive sleep apnea and

obesity hypoventilation syndrome, chronic hypoxic respiratory failure on home O2

at 2 L nasal cannula, hypertension hypertensive cardio vascular disease, 

previous Covid 19 infection.  Patient presented to [facility] 

slight elevation of troponin, EKG showed changes suggestive ischemic changes and

was started on aspirin, heparin drip and admitted to the hospital.  Patient was 

seen by cardiology. She was supposed to have a heart catheterization done as an 

outpatient along with [facility] for possible aortic valve replacement and mitral valve

and possible CABG.  Patient underwent coronary angiography finding right 

dominant vessel, 99% ostial stenosis, aortic pressure is 100/70, 40 mm gradient 

across the aortic valve.  Left ventricular end diastolic pressure is 2530.  

Patient was transferred to [facility] for PTCA and stent 

placement of the RCA which was unsuccessful.  Consult with cardiothoracic 

surgery for CABG and valve repair/replacement.





4/1: Patient is seen today in the intensive care unit.  She denies chest pain or

shortness of breath. She complains of anxiety and decreased appetite. Less cough

today.She has been seen by intensivist and cardiothoracic surgery, currently on 

heparin drip, consult was added for dental evaluation and clearance and 

panoramic CT.  Patient has been afebrile, heart rate in the 80s, blood pressure 

101/73, pulse ox 96%.  Repeat blood work reveals CBC is unremarkable.  Potassium

4.0.  Urinalysis negative for infection.  Hepatitis panel negative.  MRSA nasal 

screen is in process.  CT of the chest revealed cardiomegaly with suspected 

pulmonary edema and moderate to marked right sided pleural effusion.  Associated

infection can't be excluded.


Echocardiogram reveals EF of 25-30% with mild concentric left ventricular 

hypertrophy, severe global hypokinesia of the LV, severe aortic stenosis with 

gradient 78.27 mm a new 3/47.94 mmHg, trace mitral regurgitation, mild mitral 

stenosis, mild tricuspid regurgitation, mild pulmonary hypertension, RVSP 43.33 

mmHg.


Carotid ultrasound revealed less than 50% stenosis bilateral carotid systems.





4/2: Patient is sitting up in bed she underwent CTA of the chest for evaluation 

of dissection of the right coronary artery, she denies any chest pain at this 

time, she has no shortness breath, she has no abdominal pain, nausea or 

vomiting, she continues to have some coughing noted from production, she 

continues to be somewhat depressed and anxious and she is not sure what to do, 

she is missing her daughter she stated and she doesn't think her daughter is 

able to come and see her.





4/3: Patient is sitting on the bed in no apparent distress, she continues to be 

quite depressed and anxious about her situation, she has not made up her mind, I

had a long conversation with Dr. Jacobs regarding the plan to pursue the [facility] care at this time, she was deemed high surgical risk per cardiovascular 

surgery service at the current [place], and she would benefit from a 

transcatheter aortic valve replacement plus reattempted PCI of the RCA, her CT 

angiography of the chest did not show evidence of any dissection of the thoracic

aorta , patient is maintained on Zoloft 100 mg every day, she is mentioned in 

ICU, and the plan to the PCI hopefully in the next 24 hours patient and her 

daughter Janneth are in agreement for conservative approach and they understand the

risks of the procedure and that she will have the TAVR at a later date or during

this hospital admission depends on her symptoms, we will consult Psychiatry for 

depression and possible bipolar disorder.





4/4: Patient remains in intensive care unit, afebrile, heart rate in the 80s, 

blood pressure 101/58, pulse ox 97% on room air.  Patient denies having any 

chest pain but continues to have anxiety issues.  Consult with psychiatry is 

pending.  Patient has been scheduled for CT TAVR protocol for today.  Repeat 

blood work reveals WBC 15, hemoglobin 8.6, platelet count 172.  Sodium 134, BUN 

41 creatinine 1.2.  Blood sugar 149.  Total bilirubin 1.4 otherwise liver 

function tests are normal.  Nasal MRSA screen is negative.





4/5: Patient remains in the intensive care unit.  Patient underwent CAT scan 

yesterday for TAVR protoxol and was found to have moderate size right pelvic 

retroperitoneal hematoma extending into the lower abdomen presumed from att

empted right groin catheter insertion.  Consult was added for vascular surgery 

with plan to continue supportive care, no plan for surgical intervention.  

Heparin was discontinued.  Patient was transfused 1 unit of packed RBCs for 

hemoglobin of 6.9 with repeat hemoglobin of 7.6.


Patient has been afebrile, heart rate 70, blood pressure 109/62, pulse ox 97%.  

Cardiac monitor is sinus rhythm.  Patient has also been seen and followed by 

psychiatry for depression and acute bereavement with the loss of her , 

recommendations Zoloft 125 mg daily at bedtime.





4/6: Patient remains in the intensive care unit.  No signs of bleeding and no 

worsening hematoma with hemoglobin stable at 7.6. Vascular surgery has cleared 

patient to resume heparin if needed, continue to monitor for bleeding and daily 

CBC.  Plan is for Dr. Jacobs will proceed with intervention tomorrow.  Patient

is also followed by intensivist, cardiothoracic surgery and psychiatry.





4/7: Patient is status post stent in the RCA today with Dr. Jacobs.  She is 

seen postprocedure and is comfortable.  She denies having any chest pain.  

Anxiety is controlled.  Approach was from the left groin, no bleeding or 

hematoma noted.  Patient remains afebrile, heart rate in the 80s, blood pressure

92/57, pulse ox 94% on 2 L nasal cannula.  Repeat hemoglobin is stable at 7.5, 

WBC 11.5.  Electrolytes are normal, BUN 21 creatinine 0.61.





4/8: Patient remains in the intensive care unit status post stent of the RCA 

yesterday.  Patient has moderate to severe LAD lesion with significant FFR to be

addressed at a later time as well asTAVR will be planned for the next 1-2 weeks.

 No hematoma to the left groin.  Patient is waiting for a bed on the cardiac 

stepdown unit. Vascular surgeries following for retroperitoneal hematoma that 

does not appear to be actively bleeding, plan for supportive care.  Patient 

remains afebrile heart rate 85, blood pressure 96/54, pulse ox 98% on room air..

 BUN 19 and creatinine 0.62.  Anticipate that patient will be able to work with 

PT and OT determine home care versus subacute rehab needs.





4/9: Patient was moved out of the ICU to a telemetry unit, she is in a better 

spurs, she responded very well to the sertraline 125 mg at bedtime, she is 

continued to have minimal cough no phlegm production, she is using Flonase and 

Claritin for postnasal drip, she denies any abdominal pain, she has not had a 

bowel movement attempted we will start the patient on lactulose 20 g orally 

twice every day, start the patient on Senokot 2 tablets at bedtime, follow-up 

with the patient very closely, we'll discuss with cardiology and cardiothoracic 

surgery team whether the patient would have TAVR next week. 





4/10: Patient sitting up in bed that she is feeling better today she continues 

to have significant constipation, she has not had a bowel movement despite all 

the medications, physical therapy will see the patient move the patient on, 

patient will likely be transferred to subacute rehabilitation initially 4 hours 

if there is no plan to doTAVR this coming week. 





4/11: Patient is seen today on the cardiac stepdown unit.  She denies having any

chest pain at this time but states she had a little bit of pressure in her chest

when she was getting back into bed.  She also states she has some cramping in 

her right leg.  She did work with physical therapy this morning and the 

recommendations are subacute rehab.  Patient is having prune juice now has 

started had MiraLAX but no bowel movement.  Patient's been afebrile, heart rate 

in 70s and 80s, blood pressure 103/69, pulse ox 98% on 2 L nasal cannula.  Blood

work reveals WBC 9.8, hemoglobin 8.1, platelet count 230.  Electrolytes are 

normal.  BUN 16 creatinine 0.54.  Total bilirubin 1.9.  Other liver function 

tests are normal.  Patient is ready for discharge to subacute rehab but we will 

hold as plan is not in place yet and patient is having ongoing constipation.  

Plan for discharge to [facility] tomorrow once arrangements are completed.  Patient

is not appropriate for inpatient rehab.





4/12: Patient states that she started having some nausea last evening.  She is 

still not had a bowel movement.  She is taking prune juice and also on lactulo

se, MiraLAX and Senokot.  She is refusing her medications today.  Patient is 

afebrile, heart rate in the 80s, blood pressure 78/44, pulse ox 99 on 2 L nasal 

cannula.  Digoxin level 0.6.  Insurance authorization has been obtained for 

subacute rehab at [facility].  Plan to monitor patient overnight, continue bowel 

regime and hopefully discharge tomorrow








REVIEW OF SYSTEMS


Constitutional: No fever, no chills, no night sweats.  No weight change.  

Positive for generalized weakness, positive for fatigue or lethargy.  No daytime

sleepiness.


HEENT: No headache.  No blurred vision or double vision, no loss of vision.  No 

loss of Hearing, no ringing in the ears, no dizziness.  No nasal drainage or 

congestion.  No epistaxis.  No sore throat.


Lungs: Positive for shortness of breath, positive for cough, no sputum 

production.  No wheezing.  Reports dyspnea on exertion.


Cardiovascular: No chest pain, no lower extremity edema.  No palpitations.  No 

paroxysmal nocturnal dyspnea.  No orthopnea.  No lightheadedness or dizziness.  

No syncopal episodes.


Abdominal: No right lower quadrant abdominal pain.  No nausea, vomiting.  No 

diarrhea.  Reports constipation.  No bloody or tarry stools.  Reports loss of 

appetite.


Genitourinary: No dysuria, increased frequency, urgency.  No urinary retention.


Musculoskeletal: No myalgias.  Generalized muscle weakness, reported gait 

dysfunction requiring significant assistance, no frequent falls.  Positive for 

back pain.  No neck pain.


Integumentary: No wounds, no lesions.  No rash or pruritus.  Positive for 

bruising.  No change in hair or nails.


Neurologic: No aphasia. No facial droop. No change in mentation. No head injury.

No headache. No paralysis. No paresthesia.


Psychiatric: Positive for depression.  Reports anxiety.  Positive for mood 

swings


Endocrine: No abnormal blood sugars.  Positive for weight change.   





PHYSICAL EXAMINATION


Gen: This is a 71-year-old  female.


HEENT: Head is atraumatic, normocephalic. Pupils equal, round. Sclerae is 

anicteric. 


NECK: Supple. No JVD. No lymphadenopathy. No thyromegaly. 


LUNGS: Decreased breath sounds at the bases, decreased tactile fremitus at the 

right lower third, few rhonchi, no expiratory wheeze, no chest wall tenderness. 

No intercostal retractions.


HEART: First heart sound is depressed, second heart sound is normal, systolic 

ejection murmur 3/6 at the right upper sternal border radiating to the base of 

the neck, systolic ejection murmur 2/6 at the apex rating to the axilla.


ABDOMEN: Soft, mild tenderness generalized, nondistended, positive bowel sounds,

multiple ecchymosis.


EXTREMITIES: 1+ pedal edema.  No calf tenderness.  Dorsalis pedis +1 

bilaterally. 


NEUROLOGICAL: Patient is awake, alert and oriented x3. Cranial nerves 2 through 

12 are grossly intact.,  Cranial nerves II-12 appear grossly intact, muscle 

power 4 out of 5 in upper and lower extremities bilaterally.





ASSESSMENT AND PLAN





1.   Non ST elevation  myocardial infarction with history of prior dilated 

cardiomyopathy status post coronary angiography finding right dominant vessel, 

99% ostial stenosis, aortic pressure is 100/70, 40 mm gradient across the aortic

valve.  Left ventricular end diastolic pressure is 2530.  Patient was transf

erred to [facility] for PTCA and stent placement of the RCA 

which was unsuccessful.  Consult with cardiothoracic surgery appreciated.  

Intensivist consultation appreciated.  Continue aspirin 81 mg daily, PLAVIX 75 

MG orally daily, Coreg 6.25 mg twice daily,continue Atorvastatin 40 mg orally 

daily, now with successful   Stent of the RCA with Dr. Jacobs 4/7. LAD 

stenosis to be addressed at a later time.





2.  Dilated cardiomyopathy.  continue patient on carvedilol 3.125 mg orally 

twice every day, digoxin 125 g daily, we will continue with spironolactone 25 

mg orally daily.  Digoxin level .





3.  Severe aortic valve stenosis with mild aortic regurgitation.  TAVR to be 

scheduled in the near future.





4.  Moderate to Severe mitral regurgitation. likely will continue with conser

vative management no plan for mitral valve repair. 





5.  Hyperlipidemia.   She was started on atorvastatin 40 mg once every day.





6.  Obesity with obstructive sleep apnea and obesity hypoventilation syndrome.  

Patient has not had sleep study done yet. 





7.  Chronic hypoxic respiratory failure secondary to his Covid 19 and underlying

COPD and possible obstructive sleep apnea and hypoventilation syndrome.  Patient

is normally on 2 L nasal cannula.





8.  Acute on chronic systolic heart failure.  Off Lasix, continue Coreg, 

Aldactone 25 mg daily.  





9.  Hypertension, hypertensive cardiovascular disease. continue carvedilol 3.125

mg orally twice every day patient is not on ACE-I or ARB due to severe 

hypertension,  





10.  Recurrent depression and generalized anxiety disorder.  Consult consult 

with psychiatry appreciated.  Patient on Zoloft 125 mg at bedtime


 


11.  DVT prophylaxis. we will continue with Lovenox 40 mg SC daily 





12.  GI  prophylaxis.  Protonix 40 mg IV push daily. 





13.  Acute retroperitoneal hematoma with acute blood loss anemia status post 

transfusion 1 unit of packed RBCs.  Consult with vascular surgery appreciated. 

No plan for any surgical interventions.  Monitor closely for bleeding.





14. constipation.  Continue patient on Senokot 2 tablet at bedtime along with 

lactulose 20 g orally twice every day, MiraLAX 17 g daily, prune juice.





Full code.








DISCHARGE PLAN


[place] on Wednesday





Impression and plan of care have been directed as dictated by the signing 

physician.  Charla Guevara nurse practitioner acting as scribe for signing 

physician.





Objective





- Vital Signs


Vital signs: 


                                   Vital Signs











Temp  98.8 F   04/12/22 12:00


 


Pulse  87   04/12/22 12:00


 


Resp  18   04/12/22 12:00


 


BP  78/44   04/12/22 12:00


 


Pulse Ox  99   04/12/22 12:00








                                 Intake & Output











 04/11/22 04/12/22 04/12/22





 18:59 06:59 18:59


 


Intake Total 0  118


 


Output Total 400 0 750


 


Balance -400 0 -632


 


Weight 111.9 kg  


 


Intake:   


 


  Oral 0  118


 


Output:   


 


  Urine 400  750


 


  Stool 0 0 


 


Other:   


 


  Voiding Method Incontinent Incontinent 





 External Catheter External Catheter 








                       ABP, PAP, CO, CI - Last Documented











Arterial Blood Pressure        107/66

















- Labs


CBC & Chem 7: 


                                 04/11/22 08:28





                                 04/11/22 08:28

## 2022-04-12 NOTE — XR
EXAMINATION TYPE: XR abdomen 1V

 

DATE OF EXAM: 4/12/2022

 

Comparison: None

 

Clinical History: 71-year-old female constipation and abdominal pain

 

Findings:

Degenerative changes lower lumbar spine. Mild degenerative spurring at the hips. Prominent stool dist
ending the rectum up to 8.2 cm wide. Only mild scattered stool elsewhere in the abdomen. No dilated s
mall bowel loops. Supine imaging limited for assessment of free air. No suspicious calcification seen
.

 

 

Impression:

Correlate for fecal impaction. Stool distends the rectum up to 8.2 cm wide. Only mild stool elsewhere
 within the colon.

## 2022-04-13 RX ADMIN — BUDESONIDE AND FORMOTEROL FUMARATE DIHYDRATE SCH: 160; 4.5 AEROSOL RESPIRATORY (INHALATION) at 20:22

## 2022-04-13 RX ADMIN — OXYCODONE HYDROCHLORIDE AND ACETAMINOPHEN SCH: 500 TABLET ORAL at 18:15

## 2022-04-13 RX ADMIN — IPRATROPIUM BROMIDE AND ALBUTEROL SULFATE SCH: .5; 3 SOLUTION RESPIRATORY (INHALATION) at 08:29

## 2022-04-13 RX ADMIN — DOCUSATE SODIUM AND SENNOSIDES SCH EACH: 50; 8.6 TABLET ORAL at 10:25

## 2022-04-13 RX ADMIN — ATORVASTATIN CALCIUM SCH MG: 40 TABLET, FILM COATED ORAL at 20:31

## 2022-04-13 RX ADMIN — CLOPIDOGREL BISULFATE SCH MG: 75 TABLET ORAL at 10:25

## 2022-04-13 RX ADMIN — ASPIRIN 81 MG CHEWABLE TABLET SCH MG: 81 TABLET CHEWABLE at 10:25

## 2022-04-13 RX ADMIN — METOCLOPRAMIDE SCH: 5 INJECTION, SOLUTION INTRAMUSCULAR; INTRAVENOUS at 18:15

## 2022-04-13 RX ADMIN — LACTULOSE SCH: 20 SOLUTION ORAL at 20:31

## 2022-04-13 RX ADMIN — IPRATROPIUM BROMIDE AND ALBUTEROL SULFATE SCH: .5; 3 SOLUTION RESPIRATORY (INHALATION) at 16:11

## 2022-04-13 RX ADMIN — METOCLOPRAMIDE SCH: 5 INJECTION, SOLUTION INTRAMUSCULAR; INTRAVENOUS at 13:39

## 2022-04-13 RX ADMIN — IPRATROPIUM BROMIDE AND ALBUTEROL SULFATE SCH: .5; 3 SOLUTION RESPIRATORY (INHALATION) at 12:03

## 2022-04-13 RX ADMIN — METOCLOPRAMIDE SCH MG: 5 INJECTION, SOLUTION INTRAMUSCULAR; INTRAVENOUS at 00:09

## 2022-04-13 RX ADMIN — DIGOXIN SCH MCG: 125 TABLET ORAL at 10:25

## 2022-04-13 RX ADMIN — IPRATROPIUM BROMIDE AND ALBUTEROL SULFATE SCH: .5; 3 SOLUTION RESPIRATORY (INHALATION) at 20:22

## 2022-04-13 RX ADMIN — METOCLOPRAMIDE SCH MG: 5 INJECTION, SOLUTION INTRAMUSCULAR; INTRAVENOUS at 23:38

## 2022-04-13 RX ADMIN — SERTRALINE HYDROCHLORIDE SCH MG: 25 TABLET ORAL at 20:31

## 2022-04-13 RX ADMIN — ENOXAPARIN SODIUM SCH MG: 40 INJECTION SUBCUTANEOUS at 10:25

## 2022-04-13 RX ADMIN — SPIRONOLACTONE SCH MG: 25 TABLET, FILM COATED ORAL at 10:25

## 2022-04-13 RX ADMIN — DOCUSATE SODIUM AND SENNOSIDES SCH EACH: 50; 8.6 TABLET ORAL at 20:31

## 2022-04-13 RX ADMIN — ACETAMINOPHEN PRN MG: 325 TABLET, FILM COATED ORAL at 23:38

## 2022-04-13 RX ADMIN — OXYCODONE HYDROCHLORIDE AND ACETAMINOPHEN SCH MG: 500 TABLET ORAL at 06:39

## 2022-04-13 RX ADMIN — LORATADINE SCH MG: 10 TABLET ORAL at 10:25

## 2022-04-13 RX ADMIN — Medication SCH MG: at 06:39

## 2022-04-13 RX ADMIN — BUDESONIDE AND FORMOTEROL FUMARATE DIHYDRATE SCH: 160; 4.5 AEROSOL RESPIRATORY (INHALATION) at 08:29

## 2022-04-13 RX ADMIN — METOCLOPRAMIDE SCH MG: 5 INJECTION, SOLUTION INTRAMUSCULAR; INTRAVENOUS at 06:39

## 2022-04-13 RX ADMIN — PANTOPRAZOLE SODIUM SCH MG: 40 INJECTION, POWDER, FOR SOLUTION INTRAVENOUS at 10:26

## 2022-04-13 RX ADMIN — Medication SCH MG: at 18:15

## 2022-04-13 RX ADMIN — LACTULOSE SCH GM: 20 SOLUTION ORAL at 10:26

## 2022-04-13 RX ADMIN — SERTRALINE HYDROCHLORIDE SCH MG: 100 TABLET ORAL at 20:31

## 2022-04-13 NOTE — P.PN
Subjective


Progress Note Date: 04/13/22





HISTORY OF PRESENT ILLNESS


This is a 71-year-old female with past medical history of dilated cardiomyopathy

with ejection fraction of 15-20%, severe aortic valve stenosis, mild aortic 

regurgitation with moderate to severe mitral regurgitation, chronic systolic 

heart failure, hyperlipidemia, obesity with possible obstructive sleep apnea and

obesity hypoventilation syndrome, chronic hypoxic respiratory failure on home O2

at 2 L nasal cannula, hypertension hypertensive cardio vascular disease, 

previous Covid 19 infection.  Patient presented to [facility] 

slight elevation of troponin, EKG showed changes suggestive ischemic changes and

was started on aspirin, heparin drip and admitted to the hospital.  Patient was 

seen by cardiology. She was supposed to have a heart catheterization done as an 

outpatient along with [facility] for possible aortic valve replacement and mitral valve

and possible CABG.  Patient underwent coronary angiography finding right 

dominant vessel, 99% ostial stenosis, aortic pressure is 100/70, 40 mm gradient 

across the aortic valve.  Left ventricular end diastolic pressure is 2530.  

Patient was transferred to [facility] for PTCA and stent 

placement of the RCA which was unsuccessful.  Consult with cardiothoracic 

surgery for CABG and valve repair/replacement.





4/1: Patient is seen today in the intensive care unit.  She denies chest pain or

shortness of breath. She complains of anxiety and decreased appetite. Less cough

today.She has been seen by intensivist and cardiothoracic surgery, currently on 

heparin drip, consult was added for dental evaluation and clearance and 

panoramic CT.  Patient has been afebrile, heart rate in the 80s, blood pressure 

101/73, pulse ox 96%.  Repeat blood work reveals CBC is unremarkable.  Potassium

4.0.  Urinalysis negative for infection.  Hepatitis panel negative.  MRSA nasal 

screen is in process.  CT of the chest revealed cardiomegaly with suspected 

pulmonary edema and moderate to marked right sided pleural effusion.  Associated

infection can't be excluded.


Echocardiogram reveals EF of 25-30% with mild concentric left ventricular 

hypertrophy, severe global hypokinesia of the LV, severe aortic stenosis with 

gradient 78.27 mm a new 3/47.94 mmHg, trace mitral regurgitation, mild mitral 

stenosis, mild tricuspid regurgitation, mild pulmonary hypertension, RVSP 43.33 

mmHg.


Carotid ultrasound revealed less than 50% stenosis bilateral carotid systems.





4/2: Patient is sitting up in bed she underwent CTA of the chest for evaluation 

of dissection of the right coronary artery, she denies any chest pain at this 

time, she has no shortness breath, she has no abdominal pain, nausea or 

vomiting, she continues to have some coughing noted from production, she 

continues to be somewhat depressed and anxious and she is not sure what to do, 

she is missing her daughter she stated and she doesn't think her daughter is 

able to come and see her.





4/3: Patient is sitting on the bed in no apparent distress, she continues to be 

quite depressed and anxious about her situation, she has not made up her mind, I

had a long conversation with Dr. Jacobs regarding the plan to pursue the [facility] care at this time, she was deemed high surgical risk per cardiovascular 

surgery service at the current [facility], and she would benefit from a 

transcatheter aortic valve replacement plus reattempted PCI of the RCA, her CT 

angiography of the chest did not show evidence of any dissection of the thoracic

aorta , patient is maintained on Zoloft 100 mg every day, she is mentioned in 

ICU, and the plan to the PCI hopefully in the next 24 hours patient and her 

daughter Janneth are in agreement for conservative approach and they understand the

risks of the procedure and that she will have the TAVR at a later date or during

this hospital admission depends on her symptoms, we will consult Psychiatry for 

depression and possible bipolar disorder.





4/4: Patient remains in intensive care unit, afebrile, heart rate in the 80s, 

blood pressure 101/58, pulse ox 97% on room air.  Patient denies having any 

chest pain but continues to have anxiety issues.  Consult with psychiatry is 

pending.  Patient has been scheduled for CT TAVR protocol for today.  Repeat 

blood work reveals WBC 15, hemoglobin 8.6, platelet count 172.  Sodium 134, BUN 

41 creatinine 1.2.  Blood sugar 149.  Total bilirubin 1.4 otherwise liver 

function tests are normal.  Nasal MRSA screen is negative.





4/5: Patient remains in the intensive care unit.  Patient underwent CAT scan 

yesterday for TAVR protoxol and was found to have moderate size right pelvic 

retroperitoneal hematoma extending into the lower abdomen presumed from att

empted right groin catheter insertion.  Consult was added for vascular surgery 

with plan to continue supportive care, no plan for surgical intervention.  

Heparin was discontinued.  Patient was transfused 1 unit of packed RBCs for 

hemoglobin of 6.9 with repeat hemoglobin of 7.6.


Patient has been afebrile, heart rate 70, blood pressure 109/62, pulse ox 97%.  

Cardiac monitor is sinus rhythm.  Patient has also been seen and followed by 

psychiatry for depression and acute bereavement with the loss of her , 

recommendations Zoloft 125 mg daily at bedtime.





4/6: Patient remains in the intensive care unit.  No signs of bleeding and no 

worsening hematoma with hemoglobin stable at 7.6. Vascular surgery has cleared 

patient to resume heparin if needed, continue to monitor for bleeding and daily 

CBC.  Plan is for Dr. Jacobs will proceed with intervention tomorrow.  Patient

is also followed by intensivist, cardiothoracic surgery and psychiatry.





4/7: Patient is status post stent in the RCA today with Dr. Jacobs.  She is 

seen postprocedure and is comfortable.  She denies having any chest pain.  

Anxiety is controlled.  Approach was from the left groin, no bleeding or 

hematoma noted.  Patient remains afebrile, heart rate in the 80s, blood pressure

92/57, pulse ox 94% on 2 L nasal cannula.  Repeat hemoglobin is stable at 7.5, 

WBC 11.5.  Electrolytes are normal, BUN 21 creatinine 0.61.





4/8: Patient remains in the intensive care unit status post stent of the RCA 

yesterday.  Patient has moderate to severe LAD lesion with significant FFR to be

addressed at a later time as well asTAVR will be planned for the next 1-2 weeks.

 No hematoma to the left groin.  Patient is waiting for a bed on the cardiac 

stepdown unit. Vascular surgeries following for retroperitoneal hematoma that 

does not appear to be actively bleeding, plan for supportive care.  Patient 

remains afebrile heart rate 85, blood pressure 96/54, pulse ox 98% on room air..

 BUN 19 and creatinine 0.62.  Anticipate that patient will be able to work with 

PT and OT determine home care versus subacute rehab needs.





4/9: Patient was moved out of the ICU to a telemetry unit, she is in a better 

spurs, she responded very well to the sertraline 125 mg at bedtime, she is 

continued to have minimal cough no phlegm production, she is using Flonase and 

Claritin for postnasal drip, she denies any abdominal pain, she has not had a 

bowel movement attempted we will start the patient on lactulose 20 g orally 

twice every day, start the patient on Senokot 2 tablets at bedtime, follow-up 

with the patient very closely, we'll discuss with cardiology and cardiothoracic 

surgery team whether the patient would have TAVR next week. 





4/10: Patient sitting up in bed that she is feeling better today she continues 

to have significant constipation, she has not had a bowel movement despite all 

the medications, physical therapy will see the patient move the patient on, 

patient will likely be transferred to subacute rehabilitation initially 4 hours 

if there is no plan to doTAVR this coming week. 





4/11: Patient is seen today on the cardiac stepdown unit.  She denies having any

chest pain at this time but states she had a little bit of pressure in her chest

when she was getting back into bed.  She also states she has some cramping in 

her right leg.  She did work with physical therapy this morning and the 

recommendations are subacute rehab.  Patient is having prune juice now has 

started had MiraLAX but no bowel movement.  Patient's been afebrile, heart rate 

in 70s and 80s, blood pressure 103/69, pulse ox 98% on 2 L nasal cannula.  Blood

work reveals WBC 9.8, hemoglobin 8.1, platelet count 230.  Electrolytes are 

normal.  BUN 16 creatinine 0.54.  Total bilirubin 1.9.  Other liver function 

tests are normal.  Patient is ready for discharge to subacute rehab but we will 

hold as plan is not in place yet and patient is having ongoing constipation.  

Plan for discharge to [facility] tomorrow once arrangements are completed.  Patient

is not appropriate for inpatient rehab.





4/12: Patient states that she started having some nausea last evening.  She is 

still not had a bowel movement.  She is taking prune juice and also on lactulo

se, MiraLAX and Senokot.  She is refusing her medications today.  Patient is 

afebrile, heart rate in the 80s, blood pressure 78/44, pulse ox 99 on 2 L nasal 

cannula.  Digoxin level 0.6.  Insurance authorization has been obtained for 

subacute rehab at [facility].  Plan to monitor patient overnight, continue bowel 

regime and hopefully discharge tomorrow





4/13: She continues to have nausea and constipation.  Abdominal x-ray from 

yesterday revealed fecal impaction.  Stool distends the rectum up to 8.2 cm 

wide.








REVIEW OF SYSTEMS


Constitutional: No fever, no chills, no night sweats.  No weight change.  

Positive for generalized weakness, positive for fatigue or lethargy.  No daytime

sleepiness.


HEENT: No headache.  No blurred vision or double vision, no loss of vision.  No 

loss of Hearing, no ringing in the ears, no dizziness.  No nasal drainage or 

congestion.  No epistaxis.  No sore throat.


Lungs: Positive for shortness of breath, positive for cough, no sputum product

ion.  No wheezing.  Reports dyspnea on exertion.


Cardiovascular: No chest pain, no lower extremity edema.  No palpitations.  No 

paroxysmal nocturnal dyspnea.  No orthopnea.  No lightheadedness or dizziness.  

No syncopal episodes.


Abdominal: No right lower quadrant abdominal pain.  No nausea, vomiting.  No 

diarrhea.  Reports severe constipation.  No bloody or tarry stools.  Reports 

loss of appetite.


Genitourinary: No dysuria, increased frequency, urgency.  No urinary retention.


Musculoskeletal: No myalgias.  Generalized muscle weakness, reported gait 

dysfunction requiring significant assistance, no frequent falls.  Positive for 

back pain.  No neck pain.


Integumentary: No wounds, no lesions.  No rash or pruritus.  Positive for 

bruising.  No change in hair or nails.


Neurologic: No aphasia. No facial droop. No change in mentation. No head injury.

No headache. No paralysis. No paresthesia.


Psychiatric: Positive for depression.  Reports anxiety.  Positive for mood 

swings


Endocrine: No abnormal blood sugars.  Positive for weight change.   





PHYSICAL EXAMINATION


Gen: This is a 71-year-old  female.


HEENT: Head is atraumatic, normocephalic. Pupils equal, round. Sclerae is 

anicteric. 


NECK: Supple. No JVD. No lymphadenopathy. No thyromegaly. 


LUNGS: Decreased breath sounds at the bases, decreased tactile fremitus at the 

right lower third, few rhonchi, no expiratory wheeze, no chest wall tenderness. 

No intercostal retractions.


HEART: First heart sound is depressed, second heart sound is normal, systolic 

ejection murmur 3/6 at the right upper sternal border radiating to the base of 

the neck, systolic ejection murmur 2/6 at the apex rating to the axilla.


ABDOMEN: Soft, +tenderness generalized, nondistended, positive bowel sounds, 

multiple ecchymosis.


EXTREMITIES: 1+ pedal edema.  No calf tenderness.  Dorsalis pedis +1 

bilaterally. 


NEUROLOGICAL: Patient is awake, alert and oriented x3. Cranial nerves 2 through 

12 are grossly intact.,  Cranial nerves II-12 appear grossly intact, muscle 

power 4 out of 5 in upper and lower extremities bilaterally.





ASSESSMENT AND PLAN


1.   Non ST elevation  myocardial infarction with history of prior dilated 

cardiomyopathy status post coronary angiography finding right dominant vessel, 

99% ostial stenosis, aortic pressure is 100/70, 40 mm gradient across the aortic

valve.  Left ventricular end diastolic pressure is 2530.  Patient was 

transferred to [facility] for PTCA and stent placement of the 

RCA which was unsuccessful.  Consult with cardiothoracic surgery appreciated.  

Intensivist consultation appreciated.  Continue aspirin 81 mg daily, PLAVIX 75 

MG orally daily, Coreg 6.25 mg twice daily,continue Atorvastatin 40 mg orally 

daily, now with successful   Stent of the RCA with Dr. Jacobs 4/7. LAD 

stenosis to be addressed at a later time.





2.  Dilated cardiomyopathy.  continue patient on carvedilol 3.125 mg orally 

twice every day, digoxin 125 g daily, we will continue with spironolactone 25 

mg orally daily.  Digoxin level .





3.  Severe aortic valve stenosis with mild aortic regurgitation.  TAVR to be 

scheduled in the near future.





4.  Moderate to Severe mitral regurgitation. likely will continue with 

conservative management no plan for mitral valve repair. 





5.  Hyperlipidemia.   She was started on atorvastatin 40 mg once every day.





6.  Obesity with obstructive sleep apnea and obesity hypoventilation syndrome.  

Patient has not had sleep study done yet. 





7.  Chronic hypoxic respiratory failure secondary to his Covid 19 and underlying

COPD and possible obstructive sleep apnea and hypoventilation syndrome.  Patient

is normally on 2 L nasal cannula.





8.  Acute on chronic systolic heart failure.  Off Lasix, continue Coreg, 

Aldactone 25 mg daily.  





9.  Hypertension, hypertensive cardiovascular disease. continue carvedilol 3.125

mg orally twice every day patient is not on ACE-I or ARB due to severe 

hypertension,  





10.  Recurrent depression and generalized anxiety disorder.  Consult consult 

with psychiatry appreciated.  Patient on Zoloft 125 mg at bedtime


 


11.  DVT prophylaxis. we will continue with Lovenox 40 mg SC daily 





12.  GI  prophylaxis.  Protonix 40 mg IV push daily. 





13.  Acute retroperitoneal hematoma with acute blood loss anemia status post 

transfusion 1 unit of packed RBCs.  Consult with vascular surgery appreciated. 

No plan for any surgical interventions.  Monitor closely for bleeding.





14.  Severe constipation.  Continue patient on Senokot 2 tablet at bedtime along

with lactulose 20 g orally twice every day, MiraLAX 17 g daily, prune juice, and

fleets enema..





Full code.








DISCHARGE PLAN


[place] on Wednesday





Impression and plan of care have been directed as dictated by the signing 

physician.  Charla Guevara nurse practitioner acting as scribe for signing 

physician.





Objective





- Vital Signs


Vital signs: 


                                   Vital Signs











Temp  98.7 F   04/13/22 11:30


 


Pulse  83   04/13/22 11:30


 


Resp  18   04/13/22 11:30


 


BP  94/62   04/13/22 11:30


 


Pulse Ox  96   04/13/22 11:30








                                 Intake & Output











 04/12/22 04/13/22 04/13/22





 18:59 06:59 18:59


 


Intake Total 168  125


 


Output Total 750 250 


 


Balance -582 -250 125


 


Intake:   


 


  Intake, IV Titration 50  





  Amount   


 


    IV Fluid Continuation 300 50  





    ml @ 0 mls/hr IV .STK-   





    [facility] Rx#:[id number]   


 


  Oral 118  125


 


Output:   


 


  Urine 750 250 


 


  Stool 0  


 


Other:   


 


  Voiding Method Incontinent Incontinent Incontinent





 External Catheter External Catheter External Catheter


 


  # Bowel Movements   0








                       ABP, PAP, CO, CI - Last Documented











Arterial Blood Pressure        107/66

















- Labs


CBC & Chem 7: 


                                 04/11/22 08:28





                                 04/11/22 08:28

## 2022-04-13 NOTE — P.DS
Providers


Date of admission: 


03/31/22 09:14





Expected date of discharge: 04/13/22


Attending physician: 


Michelle Knight





Consults: 





                                        





03/31/22 14:31


Consult Physician Routine 


   Consulting Provider: Benita Romero


   Consult Reason/Comments: cardiac surgery clearance


   Do you want consulting provider notified?: Yes


   Placement Type Exists?: Yes





03/31/22 14:32


Consult Physician Routine 


   Consulting Provider: Mino Lindo


   Consult Reason/Comments: Pulmonary clearance for cardiac surgery


   Do you want consulting provider notified?: Yes


   Placement Type Exists?: Yes





03/31/22 23:03


Consult Physician Routine 


   Consulting Provider: Delvin Vega


   Consult Reason/Comments: re: CAD


   Do you want consulting provider notified?: Already Contacted





04/01/22 13:59


Consult Physician Routine 


   Consulting Provider: Filiberto Dior


   Consult Reason/Comments: CAD/AS


   Do you want consulting provider notified?: Already Contacted





04/03/22 12:37


Consult Physician Routine 


   Consulting Provider: Romeo Lozano


   Consult Reason/Comments: Psychiatry


   Do you want consulting provider notified?: Yes





04/05/22 06:00


Consult Physician Routine 


   Consulting Provider: Teddy Lagunas


   Consult Reason/Comments: retroperitoneal hematoma post heart cath


   Do you want consulting provider notified?: Yes, Notify in am





04/07/22 10:56


Consult Physician Routine 


   Consulting Provider: [facility]


   Consult Reason/Comments: Post Interventional patient


   Do you want consulting provider notified?: Already Contacted





04/12/22 08:59


Consult Physician Routine 


   Consulting Provider: Karsten Marx


   Consult Reason/Comments: IP REHAB


   Do you want consulting provider notified?: Yes











Primary care physician: 


Michelle Knight





Hospital Course: 





HISTORY OF PRESENT ILLNESS


This is a 71-year-old female with past medical history of dilated cardiomyopathy

with ejection fraction of 15-20%, severe aortic valve stenosis, mild aortic 

regurgitation with moderate to severe mitral regurgitation, chronic systolic hea

rt failure, hyperlipidemia, obesity with possible obstructive sleep apnea and 

obesity hypoventilation syndrome, chronic hypoxic respiratory failure on home O2

at 2 L nasal cannula, hypertension hypertensive cardio vascular disease, 

previous Covid 19 infection.  Patient presented to [facility] 

slight elevation of troponin, EKG showed changes suggestive ischemic changes and

was started on aspirin, heparin drip and admitted to the hospital.  Patient was 

seen by cardiology. She was supposed to have a heart catheterization done as an 

outpatient along with [facility] for possible aortic valve replacement and mitral valve

and possible CABG.  Patient underwent coronary angiography finding right 

dominant vessel, 99% ostial stenosis, aortic pressure is 100/70, 40 mm gradient 

across the aortic valve.  Left ventricular end diastolic pressure is 2530.  

Patient was transferred to [facility] for PTCA and stent 

placement of the RCA which was unsuccessful.  Consult with cardiothoracic 

surgery for CABG and valve repair/replacement.





4/1: Patient is seen today in the intensive care unit.  She denies chest pain or

shortness of breath. She complains of anxiety and decreased appetite. Less cough

today.She has been seen by intensivist and cardiothoracic surgery, currently on 

heparin drip, consult was added for dental evaluation and clearance and 

panoramic CT.  Patient has been afebrile, heart rate in the 80s, blood pressure 

101/73, pulse ox 96%.  Repeat blood work reveals CBC is unremarkable.  Potassium

4.0.  Urinalysis negative for infection.  Hepatitis panel negative.  MRSA nasal 

screen is in process.  CT of the chest revealed cardiomegaly with suspected 

pulmonary edema and moderate to marked right sided pleural effusion.  Associated

infection can't be excluded.


Echocardiogram reveals EF of 25-30% with mild concentric left ventricular 

hypertrophy, severe global hypokinesia of the LV, severe aortic stenosis with 

gradient 78.27 mm a new 3/47.94 mmHg, trace mitral regurgitation, mild mitral 

stenosis, mild tricuspid regurgitation, mild pulmonary hypertension, RVSP 43.33 

mmHg.


Carotid ultrasound revealed less than 50% stenosis bilateral carotid systems.





4/2: Patient is sitting up in bed she underwent CTA of the chest for evaluation 

of dissection of the right coronary artery, she denies any chest pain at this 

time, she has no shortness breath, she has no abdominal pain, nausea or 

vomiting, she continues to have some coughing noted from production, she 

continues to be somewhat depressed and anxious and she is not sure what to do, 

she is missing her daughter she stated and she doesn't think her daughter is 

able to come and see her.





4/3: Patient is sitting on the bed in no apparent distress, she continues to be 

quite depressed and anxious about her situation, she has not made up her mind, I

had a long conversation with Dr. Jacobs regarding the plan to pursue the 

patient care at this time, she was deemed high surgical risk per cardiovascular 

surgery service at the current [facility], and she would benefit from a 

transcatheter aortic valve replacement plus reattempted PCI of the RCA, her CT 

angiography of the chest did not show evidence of any dissection of the thoracic

aorta , patient is maintained on Zoloft 100 mg every day, she is mentioned in 

ICU, and the plan to the PCI hopefully in the next 24 hours patient and her 

daughter Janneth are in agreement for conservative approach and they understand the

risks of the procedure and that she will have the TAVR at a later date or during

this hospital admission depends on her symptoms, we will consult Psychiatry for 

depression and possible bipolar disorder.





4/4: Patient remains in intensive care unit, afebrile, heart rate in the 80s, 

blood pressure 101/58, pulse ox 97% on room air.  Patient denies having any 

chest pain but continues to have anxiety issues.  Consult with psychiatry is 

pending.  Patient has been scheduled for CT TAVR protocol for today.  Repeat 

blood work reveals WBC 15, hemoglobin 8.6, platelet count 172.  Sodium 134, BUN 

41 creatinine 1.2.  Blood sugar 149.  Total bilirubin 1.4 otherwise liver 

function tests are normal.  Nasal MRSA screen is negative.





4/5: Patient remains in the intensive care unit.  Patient underwent CAT scan 

yesterday for TAVR protoxol and was found to have moderate size right pelvic 

retroperitoneal hematoma extending into the lower abdomen presumed from 

attempted right groin catheter insertion.  Consult was added for vascular 

surgery with plan to continue supportive care, no plan for surgical 

intervention.  Heparin was discontinued.  Patient was transfused 1 unit of 

packed RBCs for hemoglobin of 6.9 with repeat hemoglobin of 7.6.


Patient has been afebrile, heart rate 70, blood pressure 109/62, pulse ox 97%.  

Cardiac monitor is sinus rhythm.  Patient has also been seen and followed by 

psychiatry for depression and acute bereavement with the loss of her , 

recommendations Zoloft 125 mg daily at bedtime.





4/6: Patient remains in the intensive care unit.  No signs of bleeding and no 

worsening hematoma with hemoglobin stable at 7.6. Vascular surgery has cleared 

patient to resume heparin if needed, continue to monitor for bleeding and daily 

CBC.  Plan is for Dr. Jacobs will proceed with intervention tomorrow.  Patient

is also followed by intensivist, cardiothoracic surgery and psychiatry.





4/7: Patient is status post stent in the RCA today with Dr. Jacobs.  She is 

seen postprocedure and is comfortable.  She denies having any chest pain.  

Anxiety is controlled.  Approach was from the left groin, no bleeding or hem

atoma noted.  Patient remains afebrile, heart rate in the 80s, blood pressure 

92/57, pulse ox 94% on 2 L nasal cannula.  Repeat hemoglobin is stable at 7.5, 

WBC 11.5.  Electrolytes are normal, BUN 21 creatinine 0.61.





4/8: Patient remains in the intensive care unit status post stent of the RCA 

yesterday.  Patient has moderate to severe LAD lesion with significant FFR to be

addressed at a later time as well asTAVR will be planned for the next 1-2 weeks.

 No hematoma to the left groin.  Patient is waiting for a bed on the cardiac 

stepdown unit. Vascular surgeries following for retroperitoneal hematoma that 

does not appear to be actively bleeding, plan for supportive care.  Patient 

remains afebrile heart rate 85, blood pressure 96/54, pulse ox 98% on room air..

 BUN 19 and creatinine 0.62.  Anticipate that patient will be able to work with 

PT and OT determine home care versus subacute rehab needs.





4/9: Patient was moved out of the ICU to a telemetry unit, she is in a better 

spurs, she responded very well to the sertraline 125 mg at bedtime, she is 

continued to have minimal cough no phlegm production, she is using Flonase and 

Claritin for postnasal drip, she denies any abdominal pain, she has not had a 

bowel movement attempted we will start the patient on lactulose 20 g orally 

twice every day, start the patient on Senokot 2 tablets at bedtime, follow-up 

with the patient very closely, we'll discuss with cardiology and cardiothoracic 

surgery team whether the patient would have TAVR next week. 





4/10: Patient sitting up in bed that she is feeling better today she continues 

to have significant constipation, she has not had a bowel movement despite all 

the medications, physical therapy will see the patient move the patient on, 

patient will likely be transferred to subacute rehabilitation initially 4 hours 

if there is no plan to doTAVR this coming week. 





4/11: Patient is seen today on the cardiac stepdown unit.  She denies having any

chest pain at this time but states she had a little bit of pressure in her chest

when she was getting back into bed.  She also states she has some cramping in 

her right leg.  She did work with physical therapy this morning and the 

recommendations are subacute rehab.  Patient is having prune juice now has 

started had MiraLAX but no bowel movement.  Patient's been afebrile, heart rate 

in 70s and 80s, blood pressure 103/69, pulse ox 98% on 2 L nasal cannula.  Blood

work reveals WBC 9.8, hemoglobin 8.1, platelet count 230.  Electrolytes are 

normal.  BUN 16 creatinine 0.54.  Total bilirubin 1.9.  Other liver function 

tests are normal.  Patient is ready for discharge to subacute rehab but we will 

hold as plan is not in place yet and patient is having ongoing constipation.  

Plan for discharge to [facility] tomorrow once arrangements are completed.  Patient

is not appropriate for inpatient rehab.





4/12: Patient states that she started having some nausea last evening.  She is 

still not had a bowel movement.  She is taking prune juice and also on 

lactulose, MiraLAX and Senokot.  She is refusing her medications today.  Patient

is afebrile, heart rate in the 80s, blood pressure 78/44, pulse ox 99 on 2 L 

nasal cannula.  Digoxin level 0.6.  Insurance authorization has been obtained 

for subacute rehab at [facility].  Plan to monitor patient overnight, continue 

bowel regime and hopefully discharge tomorrow





4/13: She continues to have nausea and constipation.  Abdominal x-ray from 

yesterday revealed fecal impaction.  Stool distends the rectum up to 8.2 cm 

wide.








REVIEW OF SYSTEMS


Constitutional: No fever, no chills, no night sweats.  No weight change.  

Positive for generalized weakness, positive for fatigue or lethargy.  No daytime

sleepiness.


HEENT: No headache.  No blurred vision or double vision, no loss of vision.  No 

loss of Hearing, no ringing in the ears, no dizziness.  No nasal drainage or 

congestion.  No epistaxis.  No sore throat.


Lungs: Positive for shortness of breath, positive for cough, no sputum 

production.  No wheezing.  Reports dyspnea on exertion.


Cardiovascular: No chest pain, no lower extremity edema.  No palpitations.  No 

paroxysmal nocturnal dyspnea.  No orthopnea.  No lightheadedness or dizziness.  

No syncopal episodes.


Abdominal: No right lower quadrant abdominal pain.  No nausea, vomiting.  No 

diarrhea.  Reports severe constipation.  No bloody or tarry stools.  Reports 

loss of appetite.


Genitourinary: No dysuria, increased frequency, urgency.  No urinary retention.


Musculoskeletal: No myalgias.  Generalized muscle weakness, reported gait 

dysfunction requiring significant assistance, no frequent falls.  Positive for 

back pain.  No neck pain.


Integumentary: No wounds, no lesions.  No rash or pruritus.  Positive for 

bruising.  No change in hair or nails.


Neurologic: No aphasia. No facial droop. No change in mentation. No head injury.

No headache. No paralysis. No paresthesia.


Psychiatric: Positive for depression.  Reports anxiety.  Positive for mood 

swings


Endocrine: No abnormal blood sugars.  Positive for weight change.   





PHYSICAL EXAMINATION


Gen: This is a 71-year-old  female.


HEENT: Head is atraumatic, normocephalic. Pupils equal, round. Sclerae is 

anicteric. 


NECK: Supple. No JVD. No lymphadenopathy. No thyromegaly. 


LUNGS: Decreased breath sounds at the bases, decreased tactile fremitus at the 

right lower third, few rhonchi, no expiratory wheeze, no chest wall tenderness. 

No intercostal retractions.


HEART: First heart sound is depressed, second heart sound is normal, systolic 

ejection murmur 3/6 at the right upper sternal border radiating to the base of 

the neck, systolic ejection murmur 2/6 at the apex rating to the axilla.


ABDOMEN: Soft, +tenderness generalized, nondistended, positive bowel sounds, mu

ltiple ecchymosis.


EXTREMITIES: 1+ pedal edema.  No calf tenderness.  Dorsalis pedis +1 

bilaterally. 


NEUROLOGICAL: Patient is awake, alert and oriented x3. Cranial nerves 2 through 

12 are grossly intact.,  Cranial nerves II-12 appear grossly intact, muscle 

power 4 out of 5 in upper and lower extremities bilaterally.





ASSESSMENT AND PLAN


1.   Non ST elevation  myocardial infarction with history of prior dilated 

cardiomyopathy status post coronary angiography finding right dominant vessel, 

99% ostial stenosis, aortic pressure is 100/70, 40 mm gradient across the aortic

valve.  Left ventricular end diastolic pressure is 2530.  Patient was 

transferred to [facility] for PTCA and stent placement of the 

RCA which was unsuccessful.  Consult with cardiothoracic surgery appreciated.  

Intensivist consultation appreciated.  Continue aspirin 81 mg daily, PLAVIX 75 

MG orally daily, Coreg 6.25 mg twice daily,continue Atorvastatin 40 mg orally 

daily, now with successful   Stent of the RCA with Dr. Jacobs 4/7. LAD 

stenosis to be addressed at a later time.





2.  Dilated cardiomyopathy.  continue patient on carvedilol 3.125 mg orally 

twice every day, digoxin 125 g daily, we will continue with spironolactone 25 

mg orally daily.  Digoxin level .





3.  Severe aortic valve stenosis with mild aortic regurgitation.  TAVR to be 

scheduled in the near future.





4.  Moderate to Severe mitral regurgitation. likely will continue with 

conservative management no plan for mitral valve repair. 





5.  Hyperlipidemia.   She was started on atorvastatin 40 mg once every day.





6.  Obesity with obstructive sleep apnea and obesity hypoventilation syndrome.  

Patient has not had sleep study done yet. 





7.  Chronic hypoxic respiratory failure secondary to his Covid 19 and underlying

COPD and possible obstructive sleep apnea and hypoventilation syndrome.  Patient

is normally on 2 L nasal cannula.





8.  Acute on chronic systolic heart failure.  Off Lasix, continue Coreg, 

Aldactone 25 mg daily.  





9.  Hypertension, hypertensive cardiovascular disease. continue carvedilol 3.125

mg orally twice every day patient is not on ACE-I or ARB due to severe 

hypertension,  





10.  Recurrent depression and generalized anxiety disorder.  Consult consult 

with psychiatry appreciated.  Patient on Zoloft 125 mg at bedtime


 


11.  DVT prophylaxis. we will continue with Lovenox 40 mg SC daily 





12.  GI  prophylaxis.  Protonix 40 mg IV push daily. 





13.  Acute retroperitoneal hematoma with acute blood loss anemia status post 

transfusion 1 unit of packed RBCs.  Consult with vascular surgery appreciated. 

No plan for any surgical interventions.  Monitor closely for bleeding.





14.  Severe constipation.  Continue patient on Senokot 2 tablet at bedtime along

with lactulose 20 g orally twice every day, MiraLAX 17 g daily, prune juice, and

fleets enema..





Full code.








DISCHARGE PLAN


[place] on Wednesday





Impression and plan of care have been directed as dictated by the signing physi

marko Guevara nurse practitioner acting as scribe for signing physician.


Patient Condition at Discharge: Stable





Plan - Discharge Summary


Discharge Rx Participant: Yes


New Discharge Prescriptions: 


No Action


   Melatonin 3 mg PO HS


   Digoxin [Lanoxin] 125 mcg PO DAILY


   Carvedilol [Coreg] 6.25 mg PO BID


   Ipratropium-Albuterol Nebulize [Duoneb 0.5 mg-3 mg/3 ml Soln] 3 ml INHALATION

RT-Q6H PRN


     PRN Reason: Shortness Of Breath


   polyethylene glycoL 3350 [Miralax] 17 gm PO DAILY PRN


     PRN Reason: Constipation


   Spironolactone [Aldactone] 25 mg PO DAILY


   Sertraline [Zoloft] 50 mg PO DAILY


   Pantoprazole Sodium [Protonix] 40 mg PO DAILY


   Furosemide [Lasix] 40 mg PO BID


Discharge Medication List





Carvedilol [Coreg] 6.25 mg PO BID 03/31/22 [History]


Digoxin [Lanoxin] 125 mcg PO DAILY 03/31/22 [History]


Furosemide [Lasix] 40 mg PO BID 03/31/22 [History]


Ipratropium-Albuterol Nebulize [Duoneb 0.5 mg-3 mg/3 ml Soln] 3 ml INHALATION 

RT-Q6H PRN 03/31/22 [History]


Melatonin 3 mg PO HS 03/31/22 [History]


Pantoprazole Sodium [Protonix] 40 mg PO DAILY 03/31/22 [History]


Sertraline [Zoloft] 50 mg PO DAILY 03/31/22 [History]


Spironolactone [Aldactone] 25 mg PO DAILY 03/31/22 [History]


polyethylene glycoL 3350 [Miralax] 17 gm PO DAILY PRN 03/31/22 [History]








Follow up Appointment(s)/Referral(s): 


Brody Jacobs DO [STAFF PHYSICIAN] - 1 Week


[facility] [NON-STAFF] - 04/19/22 4:30 pm


()


Pricilla Power MD [STAFF PHYSICIAN] - 1 Week

## 2022-04-13 NOTE — P.PN
Subjective


This is a 71 year old female with a past medical history of NSTEMI this 

admission, Coronary artery disease with 99% RCA stenosis status post successful 

PCI with AMADO to the right coronary artery 4/7/22,  cardiomyopathy with EF 15-

20%, severe aortic valve stenosis (plan for TAVR), trace mitral valve 

regurgitation with mild mitral stenosis, dilated cardiomyopathy, Chronic 

systolic heart failure, hypertension, hyperlipidemia, COVID-19 pneumonia in S

tember 2021, obesity, depression, former smoker, and severe restrictive lung 

disease. She follows with Dr. Haynes. We are following patient for NSTEMI. 





4/7/2022 patient underwent cardiac catheterization with Dr. Jacobs on 4/7/22 

which revealed





4/13/2022


Patient seen and examined at bedside. Lying in bed comfortably. abdominal xray 

yesterday revealed fecal impaction. She is on a bowel regimen. Denies chest pain

or shortness of breath. Continues to be hypotensive BP 94/62 HR 80s. Plan is for

discharge to rehab to increase strength and endurance, allow recovery from 

NSTEMI and acute heart failure and return in the next couple of weeks for TAVR. 





Patient refusing meds this morning 


Patient currently maintained on aspirin 81mg daily, atorvastatin 40 mg nightly, 

Coreg 3.125mg twice a day, Plavix 75 mg daily, digoxin 125mcg daily, 

spironolactone 25mg daily 





GENERAL: In no acute distress. Debilitated. 


NECK: Supple without JVD or thyromegaly.


LUNGS: Breath sounds diminished in bases to auscultation bilaterally. 

Respiration equal and unlabored.  No wheezes, rales or rhonchi.


HEART: Regular rate and rhythm without murmurs, rubs or gallops. S1 and S2 hea

rd.


EXTREMITIES: Normal range of motion, no edema.  No clubbing or cyanosis. 

Peripheral pulses intact.





ASSESSMENT


Acute on chronic heart failure with reduced ejection fraction


Non-STEMI s/p PCI to RCA 


Coronary artery disease including 99% RCA stenosis status post failed attempted 

PCI


Hypertension


Hyperlipidemia


Cardiomyopathy mainly nonischemic with CAD out of proportion to cardiomyopathy


Severe aortic stenosis


Mitral regurgitation


Oxygen dependence status post COVID-19 infection





PLAN


We will continue dual antiplatelet therapy with aspirin and Plavix


Continue statin, Coreg, digoxin, spironolactone


Patient not on ACEI/ARB due to hypotension


Increase activity as tolerated 


From a cardiology perspective, patient is stable to be discharged to rehab.


Follow up outpatient and TAVR will be done as an outpatient in the next couple 

of weeks. 





Nurse practitioner note has been reviewed by physician. Signing provider agrees 

with the documented findings, assessment, and plan of care. 





Objective





- Vital Signs


Vital signs: 


                                   Vital Signs











Temp  98.7 F   04/13/22 11:30


 


Pulse  83   04/13/22 11:30


 


Resp  18   04/13/22 11:30


 


BP  94/62   04/13/22 11:30


 


Pulse Ox  96   04/13/22 11:30








                                 Intake & Output











 04/12/22 04/13/22 04/13/22





 18:59 06:59 18:59


 


Intake Total 168  125


 


Output Total 750 250 


 


Balance -582 -250 125


 


Intake:   


 


  Intake, IV Titration 50  





  Amount   


 


    IV Fluid Continuation 300 50  





    ml @ 0 mls/hr IV .STK-   





    [facility] Rx#:[id number]   


 


  Oral 118  125


 


Output:   


 


  Urine 750 250 


 


  Stool 0  


 


Other:   


 


  Voiding Method Incontinent Incontinent Incontinent





 External Catheter External Catheter External Catheter


 


  # Bowel Movements   0








                       ABP, PAP, CO, CI - Last Documented











Arterial Blood Pressure        107/66

















- Labs


CBC & Chem 7: 


                                 04/11/22 08:28





                                 04/11/22 08:28

## 2022-04-13 NOTE — P.PN
Subjective


Progress Note Date: 04/12/22





HISTORY OF PRESENT ILLNESS


This is a 71-year-old female with past medical history of dilated cardiomyopathy

with ejection fraction of 15-20%, severe aortic valve stenosis, mild aortic 

regurgitation with moderate to severe mitral regurgitation, chronic systolic 

heart failure, hyperlipidemia, obesity with possible obstructive sleep apnea and

obesity hypoventilation syndrome, chronic hypoxic respiratory failure on home O2

at 2 L nasal cannula, hypertension hypertensive cardio vascular disease, 

previous Covid 19 infection.  Patient presented to [facility] 

slight elevation of troponin, EKG showed changes suggestive ischemic changes and

was started on aspirin, heparin drip and admitted to the hospital.  Patient was 

seen by cardiology. She was supposed to have a heart catheterization done as an 

outpatient along with [facility] for possible aortic valve replacement and mitral valve

and possible CABG.  Patient underwent coronary angiography finding right 

dominant vessel, 99% ostial stenosis, aortic pressure is 100/70, 40 mm gradient 

across the aortic valve.  Left ventricular end diastolic pressure is 2530.  

Patient was transferred to [facility] for PTCA and stent 

placement of the RCA which was unsuccessful.  Consult with cardiothoracic 

surgery for CABG and valve repair/replacement.





4/1: Patient is seen today in the intensive care unit.  She denies chest pain or

shortness of breath. She complains of anxiety and decreased appetite. Less cough

today.She has been seen by intensivist and cardiothoracic surgery, currently on 

heparin drip, consult was added for dental evaluation and clearance and 

panoramic CT.  Patient has been afebrile, heart rate in the 80s, blood pressure 

101/73, pulse ox 96%.  Repeat blood work reveals CBC is unremarkable.  Potassium

4.0.  Urinalysis negative for infection.  Hepatitis panel negative.  MRSA nasal 

screen is in process.  CT of the chest revealed cardiomegaly with suspected 

pulmonary edema and moderate to marked right sided pleural effusion.  Associated

infection can't be excluded.


Echocardiogram reveals EF of 25-30% with mild concentric left ventricular 

hypertrophy, severe global hypokinesia of the LV, severe aortic stenosis with 

gradient 78.27 mm a new 3/47.94 mmHg, trace mitral regurgitation, mild mitral 

stenosis, mild tricuspid regurgitation, mild pulmonary hypertension, RVSP 43.33 

mmHg.


Carotid ultrasound revealed less than 50% stenosis bilateral carotid systems.





4/2: Patient is sitting up in bed she underwent CTA of the chest for evaluation 

of dissection of the right coronary artery, she denies any chest pain at this 

time, she has no shortness breath, she has no abdominal pain, nausea or 

vomiting, she continues to have some coughing noted from production, she 

continues to be somewhat depressed and anxious and she is not sure what to do, 

she is missing her daughter she stated and she doesn't think her daughter is 

able to come and see her.





4/3: Patient is sitting on the bed in no apparent distress, she continues to be 

quite depressed and anxious about her situation, she has not made up her mind, I

had a long conversation with Dr. Jacobs regarding the plan to pursue the [facility] care at this time, she was deemed high surgical risk per cardiovascular 

surgery service at the current [facility], and she would benefit from a 

transcatheter aortic valve replacement plus reattempted PCI of the RCA, her CT 

angiography of the chest did not show evidence of any dissection of the thoracic

aorta , patient is maintained on Zoloft 100 mg every day, she is mentioned in 

ICU, and the plan to the PCI hopefully in the next 24 hours patient and her 

daughter Janneth are in agreement for conservative approach and they understand the

risks of the procedure and that she will have the TAVR at a later date or during

this hospital admission depends on her symptoms, we will consult Psychiatry for 

depression and possible bipolar disorder.





4/4: Patient remains in intensive care unit, afebrile, heart rate in the 80s, 

blood pressure 101/58, pulse ox 97% on room air.  Patient denies having any 

chest pain but continues to have anxiety issues.  Consult with psychiatry is 

pending.  Patient has been scheduled for CT TAVR protocol for today.  Repeat 

blood work reveals WBC 15, hemoglobin 8.6, platelet count 172.  Sodium 134, BUN 

41 creatinine 1.2.  Blood sugar 149.  Total bilirubin 1.4 otherwise liver 

function tests are normal.  Nasal MRSA screen is negative.





4/5: Patient remains in the intensive care unit.  Patient underwent CAT scan 

yesterday for TAVR protoxol and was found to have moderate size right pelvic 

retroperitoneal hematoma extending into the lower abdomen presumed from att

empted right groin catheter insertion.  Consult was added for vascular surgery 

with plan to continue supportive care, no plan for surgical intervention.  

Heparin was discontinued.  Patient was transfused 1 unit of packed RBCs for 

hemoglobin of 6.9 with repeat hemoglobin of 7.6.


Patient has been afebrile, heart rate 70, blood pressure 109/62, pulse ox 97%.  

Cardiac monitor is sinus rhythm.  Patient has also been seen and followed by 

psychiatry for depression and acute bereavement with the loss of her , 

recommendations Zoloft 125 mg daily at bedtime.





4/6: Patient remains in the intensive care unit.  No signs of bleeding and no 

worsening hematoma with hemoglobin stable at 7.6. Vascular surgery has cleared 

patient to resume heparin if needed, continue to monitor for bleeding and daily 

CBC.  Plan is for Dr. Jacobs will proceed with intervention tomorrow.  Patient

is also followed by intensivist, cardiothoracic surgery and psychiatry.





4/7: Patient is status post stent in the RCA today with Dr. Jacobs.  She is 

seen postprocedure and is comfortable.  She denies having any chest pain.  

Anxiety is controlled.  Approach was from the left groin, no bleeding or 

hematoma noted.  Patient remains afebrile, heart rate in the 80s, blood pressure

92/57, pulse ox 94% on 2 L nasal cannula.  Repeat hemoglobin is stable at 7.5, 

WBC 11.5.  Electrolytes are normal, BUN 21 creatinine 0.61.





4/8: Patient remains in the intensive care unit status post stent of the RCA 

yesterday.  Patient has moderate to severe LAD lesion with significant FFR to be

addressed at a later time as well asTAVR will be planned for the next 1-2 weeks.

 No hematoma to the left groin.  Patient is waiting for a bed on the cardiac 

stepdown unit. Vascular surgeries following for retroperitoneal hematoma that 

does not appear to be actively bleeding, plan for supportive care.  Patient 

remains afebrile heart rate 85, blood pressure 96/54, pulse ox 98% on room air..

 BUN 19 and creatinine 0.62.  Anticipate that patient will be able to work with 

PT and OT determine home care versus subacute rehab needs.





4/9: Patient was moved out of the ICU to a telemetry unit, she is in a better 

spurs, she responded very well to the sertraline 125 mg at bedtime, she is 

continued to have minimal cough no phlegm production, she is using Flonase and 

Claritin for postnasal drip, she denies any abdominal pain, she has not had a 

bowel movement attempted we will start the patient on lactulose 20 g orally 

twice every day, start the patient on Senokot 2 tablets at bedtime, follow-up 

with the patient very closely, we'll discuss with cardiology and cardiothoracic 

surgery team whether the patient would have TAVR next week. 





4/10: Patient sitting up in bed that she is feeling better today she continues 

to have significant constipation, she has not had a bowel movement despite all 

the medications, physical therapy will see the patient move the patient on, 

patient will likely be transferred to subacute rehabilitation initially 4 hours 

if there is no plan to doTAVR this coming week. 





4/11: Patient is seen today on the cardiac stepdown unit.  She denies having any

chest pain at this time but states she had a little bit of pressure in her chest

when she was getting back into bed.  She also states she has some cramping in 

her right leg.  She did work with physical therapy this morning and the 

recommendations are subacute rehab.  Patient is having prune juice now has 

started had MiraLAX but no bowel movement.  Patient's been afebrile, heart rate 

in 70s and 80s, blood pressure 103/69, pulse ox 98% on 2 L nasal cannula.  Blood

work reveals WBC 9.8, hemoglobin 8.1, platelet count 230.  Electrolytes are 

normal.  BUN 16 creatinine 0.54.  Total bilirubin 1.9.  Other liver function 

tests are normal.  Patient is ready for discharge to subacute rehab but we will 

hold as plan is not in place yet and patient is having ongoing constipation.  

Plan for discharge to [facility] tomorrow once arrangements are completed.  Patient

is not appropriate for inpatient rehab.





4/12: Patient states that she started having some nausea last evening.  She is 

still not had a bowel movement.  She is taking prune juice and also on lactulo

se, MiraLAX and Senokot.  She is refusing her medications today.  Patient is 

afebrile, heart rate in the 80s, blood pressure 78/44, pulse ox 99 on 2 L nasal 

cannula.  Digoxin level 0.6.  Insurance authorization has been obtained for 

subacute rehab at [facility].  Plan to monitor patient overnight, continue bowel 

regime and hopefully discharge tomorrow








REVIEW OF SYSTEMS


Constitutional: No fever, no chills, no night sweats.  No weight change.  

Positive for generalized weakness, positive for fatigue or lethargy.  No daytime

sleepiness.


HEENT: No headache.  No blurred vision or double vision, no loss of vision.  No 

loss of Hearing, no ringing in the ears, no dizziness.  No nasal drainage or 

congestion.  No epistaxis.  No sore throat.


Lungs: Positive for shortness of breath, positive for cough, no sputum 

production.  No wheezing.  Reports dyspnea on exertion.


Cardiovascular: No chest pain, no lower extremity edema.  No palpitations.  No 

paroxysmal nocturnal dyspnea.  No orthopnea.  No lightheadedness or dizziness.  

No syncopal episodes.


Abdominal: No right lower quadrant abdominal pain.  No nausea, vomiting.  No 

diarrhea.  Reports constipation.  No bloody or tarry stools.  Reports loss of 

appetite.


Genitourinary: No dysuria, increased frequency, urgency.  No urinary retention.


Musculoskeletal: No myalgias.  Generalized muscle weakness, reported gait 

dysfunction requiring significant assistance, no frequent falls.  Positive for 

back pain.  No neck pain.


Integumentary: No wounds, no lesions.  No rash or pruritus.  Positive for 

bruising.  No change in hair or nails.


Neurologic: No aphasia. No facial droop. No change in mentation. No head injury.

No headache. No paralysis. No paresthesia.


Psychiatric: Positive for depression.  Reports anxiety.  Positive for mood 

swings


Endocrine: No abnormal blood sugars.  Positive for weight change.   





PHYSICAL EXAMINATION


Gen: This is a 71-year-old  female.


HEENT: Head is atraumatic, normocephalic. Pupils equal, round. Sclerae is 

anicteric. 


NECK: Supple. No JVD. No lymphadenopathy. No thyromegaly. 


LUNGS: Decreased breath sounds at the bases, decreased tactile fremitus at the 

right lower third, few rhonchi, no expiratory wheeze, no chest wall tenderness. 

No intercostal retractions.


HEART: First heart sound is depressed, second heart sound is normal, systolic 

ejection murmur 3/6 at the right upper sternal border radiating to the base of 

the neck, systolic ejection murmur 2/6 at the apex rating to the axilla.


ABDOMEN: Soft, mild tenderness generalized, nondistended, positive bowel sounds,

multiple ecchymosis.


EXTREMITIES: 1+ pedal edema.  No calf tenderness.  Dorsalis pedis +1 

bilaterally. 


NEUROLOGICAL: Patient is awake, alert and oriented x3. Cranial nerves 2 through 

12 are grossly intact.,  Cranial nerves II-12 appear grossly intact, muscle 

power 4 out of 5 in upper and lower extremities bilaterally.





ASSESSMENT AND PLAN





1.   Non ST elevation  myocardial infarction with history of prior dilated 

cardiomyopathy status post coronary angiography finding right dominant vessel, 

99% ostial stenosis, aortic pressure is 100/70, 40 mm gradient across the aortic

valve.  Left ventricular end diastolic pressure is 2530.  Patient was transf

erred to [facility] for PTCA and stent placement of the RCA 

which was unsuccessful.  Consult with cardiothoracic surgery appreciated.  

Intensivist consultation appreciated.  Continue aspirin 81 mg daily, PLAVIX 75 

MG orally daily, Coreg 6.25 mg twice daily,continue Atorvastatin 40 mg orally 

daily, now with successful   Stent of the RCA with Dr. Jacobs 4/7. LAD 

stenosis to be addressed at a later time.





2.  Dilated cardiomyopathy.  continue patient on carvedilol 3.125 mg orally 

twice every day, digoxin 125 g daily, we will continue with spironolactone 25 

mg orally daily.  Digoxin level .





3.  Severe aortic valve stenosis with mild aortic regurgitation.  TAVR to be 

scheduled in the near future.





4.  Moderate to Severe mitral regurgitation. likely will continue with conser

vative management no plan for mitral valve repair. 





5.  Hyperlipidemia.   She was started on atorvastatin 40 mg once every day.





6.  Obesity with obstructive sleep apnea and obesity hypoventilation syndrome.  

Patient has not had sleep study done yet. 





7.  Chronic hypoxic respiratory failure secondary to his Covid 19 and underlying

COPD and possible obstructive sleep apnea and hypoventilation syndrome.  Patient

is normally on 2 L nasal cannula.





8.  Acute on chronic systolic heart failure.  Off Lasix, continue Coreg, 

Aldactone 25 mg daily.  





9.  Hypertension, hypertensive cardiovascular disease. continue carvedilol 3.125

mg orally twice every day patient is not on ACE-I or ARB due to severe 

hypertension,  





10.  Recurrent depression and generalized anxiety disorder.  Consult consult 

with psychiatry appreciated.  Patient on Zoloft 125 mg at bedtime


 


11.  DVT prophylaxis. we will continue with Lovenox 40 mg SC daily 





12.  GI  prophylaxis.  Protonix 40 mg IV push daily. 





13.  Acute retroperitoneal hematoma with acute blood loss anemia status post 

transfusion 1 unit of packed RBCs.  Consult with vascular surgery appreciated. 

No plan for any surgical interventions.  Monitor closely for bleeding.





14. constipation.  Continue patient on Senokot 2 tablet at bedtime along with 

lactulose 20 g orally twice every day, MiraLAX 17 g daily, prune juice.





Full code.








DISCHARGE PLAN


Patient may be discharged to inpatient rehab if deemed appropriate and insurance

authorization is obtained.  Patient will be discharged once arrangements are 

completed.





Impression and plan of care have been directed as dictated by the signing 

physician.  Charla Guevara nurse practitioner acting as scribe for signing 

physician.





Objective





- Vital Signs


Vital signs: 


                                   Vital Signs











Temp  97.8 F   04/13/22 07:42


 


Pulse  82   04/13/22 07:42


 


Resp  18   04/13/22 07:42


 


BP  101/65   04/13/22 07:42


 


Pulse Ox  82 L  04/13/22 07:42








                                 Intake & Output











 04/12/22 04/13/22 04/13/22





 18:59 06:59 18:59


 


Intake Total 168  125


 


Output Total 750 250 


 


Balance -582 -250 125


 


Intake:   


 


  Intake, IV Titration 50  





  Amount   


 


    IV Fluid Continuation 300 50  





    ml @ 0 mls/hr IV .STK-   





    [facility] Rx#:[id number]   


 


  Oral 118  125


 


Output:   


 


  Urine 750 250 


 


  Stool 0  


 


Other:   


 


  Voiding Method Incontinent Incontinent 





 External Catheter External Catheter 


 


  # Bowel Movements   0








                       ABP, PAP, CO, CI - Last Documented











Arterial Blood Pressure        107/66

















- Labs


CBC & Chem 7: 


                                 04/11/22 08:28





                                 04/11/22 08:28

## 2022-04-14 LAB
ALBUMIN SERPL-MCNC: 2.8 G/DL (ref 3.5–5)
ALP SERPL-CCNC: 63 U/L (ref 38–126)
ALT SERPL-CCNC: 12 U/L (ref 4–34)
ANION GAP SERPL CALC-SCNC: 6 MMOL/L
AST SERPL-CCNC: 26 U/L (ref 14–36)
BUN SERPL-SCNC: 16 MG/DL (ref 7–17)
CALCIUM SPEC-MCNC: 8.4 MG/DL (ref 8.4–10.2)
CHLORIDE SERPL-SCNC: 107 MMOL/L (ref 98–107)
CO2 SERPL-SCNC: 26 MMOL/L (ref 22–30)
ERYTHROCYTE [DISTWIDTH] IN BLOOD BY AUTOMATED COUNT: 2.69 M/UL (ref 3.8–5.4)
ERYTHROCYTE [DISTWIDTH] IN BLOOD: 19.8 % (ref 11.5–15.5)
GLUCOSE SERPL-MCNC: 98 MG/DL (ref 74–99)
HCT VFR BLD AUTO: 27.9 % (ref 34–46)
HGB BLD-MCNC: 8.2 GM/DL (ref 11.4–16)
MAGNESIUM SPEC-SCNC: 2.4 MG/DL (ref 1.6–2.3)
MCH RBC QN AUTO: 30.6 PG (ref 25–35)
MCHC RBC AUTO-ENTMCNC: 29.5 G/DL (ref 31–37)
MCV RBC AUTO: 103.6 FL (ref 80–100)
PLATELET # BLD AUTO: 218 K/UL (ref 150–450)
POTASSIUM SERPL-SCNC: 4.7 MMOL/L (ref 3.5–5.1)
PROT SERPL-MCNC: 5.5 G/DL (ref 6.3–8.2)
SODIUM SERPL-SCNC: 139 MMOL/L (ref 137–145)
WBC # BLD AUTO: 7.6 K/UL (ref 3.8–10.6)

## 2022-04-14 RX ADMIN — ASPIRIN 81 MG CHEWABLE TABLET SCH MG: 81 TABLET CHEWABLE at 08:44

## 2022-04-14 RX ADMIN — DIGOXIN SCH MCG: 125 TABLET ORAL at 08:44

## 2022-04-14 RX ADMIN — IPRATROPIUM BROMIDE AND ALBUTEROL SULFATE SCH: .5; 3 SOLUTION RESPIRATORY (INHALATION) at 16:15

## 2022-04-14 RX ADMIN — SPIRONOLACTONE SCH MG: 25 TABLET, FILM COATED ORAL at 12:32

## 2022-04-14 RX ADMIN — SERTRALINE HYDROCHLORIDE SCH MG: 25 TABLET ORAL at 20:44

## 2022-04-14 RX ADMIN — Medication SCH: at 17:21

## 2022-04-14 RX ADMIN — SERTRALINE HYDROCHLORIDE SCH MG: 100 TABLET ORAL at 20:44

## 2022-04-14 RX ADMIN — ENOXAPARIN SODIUM SCH MG: 40 INJECTION SUBCUTANEOUS at 08:43

## 2022-04-14 RX ADMIN — METOCLOPRAMIDE SCH MG: 5 INJECTION, SOLUTION INTRAMUSCULAR; INTRAVENOUS at 06:27

## 2022-04-14 RX ADMIN — IPRATROPIUM BROMIDE AND ALBUTEROL SULFATE SCH: .5; 3 SOLUTION RESPIRATORY (INHALATION) at 20:05

## 2022-04-14 RX ADMIN — CLOPIDOGREL BISULFATE SCH MG: 75 TABLET ORAL at 08:44

## 2022-04-14 RX ADMIN — PANTOPRAZOLE SODIUM SCH MG: 40 INJECTION, POWDER, FOR SOLUTION INTRAVENOUS at 08:43

## 2022-04-14 RX ADMIN — METOCLOPRAMIDE SCH: 5 INJECTION, SOLUTION INTRAMUSCULAR; INTRAVENOUS at 17:22

## 2022-04-14 RX ADMIN — IPRATROPIUM BROMIDE AND ALBUTEROL SULFATE SCH: .5; 3 SOLUTION RESPIRATORY (INHALATION) at 11:57

## 2022-04-14 RX ADMIN — IPRATROPIUM BROMIDE AND ALBUTEROL SULFATE SCH: .5; 3 SOLUTION RESPIRATORY (INHALATION) at 08:32

## 2022-04-14 RX ADMIN — METOCLOPRAMIDE SCH: 5 INJECTION, SOLUTION INTRAMUSCULAR; INTRAVENOUS at 14:08

## 2022-04-14 RX ADMIN — METOCLOPRAMIDE SCH MG: 5 INJECTION, SOLUTION INTRAMUSCULAR; INTRAVENOUS at 23:51

## 2022-04-14 RX ADMIN — LACTULOSE SCH: 20 SOLUTION ORAL at 08:43

## 2022-04-14 RX ADMIN — OXYCODONE HYDROCHLORIDE AND ACETAMINOPHEN SCH: 500 TABLET ORAL at 06:27

## 2022-04-14 RX ADMIN — DOCUSATE SODIUM AND SENNOSIDES SCH EACH: 50; 8.6 TABLET ORAL at 20:44

## 2022-04-14 RX ADMIN — BUDESONIDE AND FORMOTEROL FUMARATE DIHYDRATE SCH: 160; 4.5 AEROSOL RESPIRATORY (INHALATION) at 08:31

## 2022-04-14 RX ADMIN — BUDESONIDE AND FORMOTEROL FUMARATE DIHYDRATE SCH: 160; 4.5 AEROSOL RESPIRATORY (INHALATION) at 20:05

## 2022-04-14 RX ADMIN — Medication SCH MG: at 06:27

## 2022-04-14 RX ADMIN — OXYCODONE HYDROCHLORIDE AND ACETAMINOPHEN SCH: 500 TABLET ORAL at 17:21

## 2022-04-14 RX ADMIN — LACTULOSE SCH: 20 SOLUTION ORAL at 20:44

## 2022-04-14 RX ADMIN — ATORVASTATIN CALCIUM SCH MG: 40 TABLET, FILM COATED ORAL at 20:44

## 2022-04-14 RX ADMIN — DOCUSATE SODIUM AND SENNOSIDES SCH: 50; 8.6 TABLET ORAL at 08:44

## 2022-04-14 NOTE — P.DS
Providers


Date of admission: 


03/31/22 09:14





Expected date of discharge: 04/14/22


Attending physician: 


Michelle Knight





Consults: 





                                        





03/31/22 14:31


Consult Physician Routine 


   Consulting Provider: Benita Romero


   Consult Reason/Comments: cardiac surgery clearance


   Do you want consulting provider notified?: Yes


   Placement Type Exists?: Yes





03/31/22 14:32


Consult Physician Routine 


   Consulting Provider: Mino Lindo


   Consult Reason/Comments: Pulmonary clearance for cardiac surgery


   Do you want consulting provider notified?: Yes


   Placement Type Exists?: Yes





03/31/22 23:03


Consult Physician Routine 


   Consulting Provider: Delvin Vega


   Consult Reason/Comments: re: CAD


   Do you want consulting provider notified?: Already Contacted





04/01/22 13:59


Consult Physician Routine 


   Consulting Provider: Filiberto Dior


   Consult Reason/Comments: CAD/AS


   Do you want consulting provider notified?: Already Contacted





04/03/22 12:37


Consult Physician Routine 


   Consulting Provider: Romeo Lozano


   Consult Reason/Comments: Psychiatry


   Do you want consulting provider notified?: Yes





04/05/22 06:00


Consult Physician Routine 


   Consulting Provider: Teddy Lagunas


   Consult Reason/Comments: retroperitoneal hematoma post heart cath


   Do you want consulting provider notified?: Yes, Notify in am





04/07/22 10:56


Consult Physician Routine 


   Consulting Provider: [facility]


   Consult Reason/Comments: Post Interventional patient


   Do you want consulting provider notified?: Already Contacted





04/12/22 08:59


Consult Physician Routine 


   Consulting Provider: Karsten Marx


   Consult Reason/Comments: IP REHAB


   Do you want consulting provider notified?: Yes











Primary care physician: 


Michelle Knight





Hospital Course: 





HISTORY OF PRESENT ILLNESS


This is a 71-year-old female with past medical history of dilated cardiomyopathy

with ejection fraction of 15-20%, severe aortic valve stenosis, mild aortic 

regurgitation with moderate to severe mitral regurgitation, chronic systolic hea

rt failure, hyperlipidemia, obesity with possible obstructive sleep apnea and 

obesity hypoventilation syndrome, chronic hypoxic respiratory failure on home O2

at 2 L nasal cannula, hypertension hypertensive cardio vascular disease, 

previous Covid 19 infection.  Patient presented to [facility] 

slight elevation of troponin, EKG showed changes suggestive ischemic changes and

was started on aspirin, heparin drip and admitted to the hospital.  Patient was 

seen by cardiology. She was supposed to have a heart catheterization done as an 

outpatient along with [facility] for possible aortic valve replacement and mitral valve

and possible CABG.  Patient underwent coronary angiography finding right 

dominant vessel, 99% ostial stenosis, aortic pressure is 100/70, 40 mm gradient 

across the aortic valve.  Left ventricular end diastolic pressure is 2530.  

Patient was transferred to [facility] for PTCA and stent 

placement of the RCA which was unsuccessful.  Consult with cardiothoracic 

surgery for CABG and valve repair/replacement.





4/1: Patient is seen today in the intensive care unit.  She denies chest pain or

shortness of breath. She complains of anxiety and decreased appetite. Less cough

today.She has been seen by intensivist and cardiothoracic surgery, currently on 

heparin drip, consult was added for dental evaluation and clearance and 

panoramic CT.  Patient has been afebrile, heart rate in the 80s, blood pressure 

101/73, pulse ox 96%.  Repeat blood work reveals CBC is unremarkable.  Potassium

4.0.  Urinalysis negative for infection.  Hepatitis panel negative.  MRSA nasal 

screen is in process.  CT of the chest revealed cardiomegaly with suspected 

pulmonary edema and moderate to marked right sided pleural effusion.  Associated

infection can't be excluded.


Echocardiogram reveals EF of 25-30% with mild concentric left ventricular 

hypertrophy, severe global hypokinesia of the LV, severe aortic stenosis with 

gradient 78.27 mm a new 3/47.94 mmHg, trace mitral regurgitation, mild mitral 

stenosis, mild tricuspid regurgitation, mild pulmonary hypertension, RVSP 43.33 

mmHg.


Carotid ultrasound revealed less than 50% stenosis bilateral carotid systems.





4/2: Patient is sitting up in bed she underwent CTA of the chest for evaluation 

of dissection of the right coronary artery, she denies any chest pain at this 

time, she has no shortness breath, she has no abdominal pain, nausea or 

vomiting, she continues to have some coughing noted from production, she 

continues to be somewhat depressed and anxious and she is not sure what to do, 

she is missing her daughter she stated and she doesn't think her daughter is 

able to come and see her.





4/3: Patient is sitting on the bed in no apparent distress, she continues to be 

quite depressed and anxious about her situation, she has not made up her mind, I

had a long conversation with Dr. Jacobs regarding the plan to pursue the 

patient care at this time, she was deemed high surgical risk per cardiovascular 

surgery service at the current [facility], and she would benefit from a 

transcatheter aortic valve replacement plus reattempted PCI of the RCA, her CT 

angiography of the chest did not show evidence of any dissection of the thoracic

aorta , patient is maintained on Zoloft 100 mg every day, she is mentioned in 

ICU, and the plan to the PCI hopefully in the next 24 hours patient and her 

daughter Janneth are in agreement for conservative approach and they understand the

risks of the procedure and that she will have the TAVR at a later date or during

this hospital admission depends on her symptoms, we will consult Psychiatry for 

depression and possible bipolar disorder.





4/4: Patient remains in intensive care unit, afebrile, heart rate in the 80s, 

blood pressure 101/58, pulse ox 97% on room air.  Patient denies having any 

chest pain but continues to have anxiety issues.  Consult with psychiatry is 

pending.  Patient has been scheduled for CT TAVR protocol for today.  Repeat 

blood work reveals WBC 15, hemoglobin 8.6, platelet count 172.  Sodium 134, BUN 

41 creatinine 1.2.  Blood sugar 149.  Total bilirubin 1.4 otherwise liver 

function tests are normal.  Nasal MRSA screen is negative.





4/5: Patient remains in the intensive care unit.  Patient underwent CAT scan 

yesterday for TAVR protoxol and was found to have moderate size right pelvic 

retroperitoneal hematoma extending into the lower abdomen presumed from 

attempted right groin catheter insertion.  Consult was added for vascular 

surgery with plan to continue supportive care, no plan for surgical 

intervention.  Heparin was discontinued.  Patient was transfused 1 unit of 

packed RBCs for hemoglobin of 6.9 with repeat hemoglobin of 7.6.


Patient has been afebrile, heart rate 70, blood pressure 109/62, pulse ox 97%.  

Cardiac monitor is sinus rhythm.  Patient has also been seen and followed by 

psychiatry for depression and acute bereavement with the loss of her , 

recommendations Zoloft 125 mg daily at bedtime.





4/6: Patient remains in the intensive care unit.  No signs of bleeding and no 

worsening hematoma with hemoglobin stable at 7.6. Vascular surgery has cleared 

patient to resume heparin if needed, continue to monitor for bleeding and daily 

CBC.  Plan is for Dr. Jacobs will proceed with intervention tomorrow.  Patient

is also followed by intensivist, cardiothoracic surgery and psychiatry.





4/7: Patient is status post stent in the RCA today with Dr. Jacobs.  She is 

seen postprocedure and is comfortable.  She denies having any chest pain.  

Anxiety is controlled.  Approach was from the left groin, no bleeding or hem

atoma noted.  Patient remains afebrile, heart rate in the 80s, blood pressure 

92/57, pulse ox 94% on 2 L nasal cannula.  Repeat hemoglobin is stable at 7.5, 

WBC 11.5.  Electrolytes are normal, BUN 21 creatinine 0.61.





4/8: Patient remains in the intensive care unit status post stent of the RCA 

yesterday.  Patient has moderate to severe LAD lesion with significant FFR to be

addressed at a later time as well asTAVR will be planned for the next 1-2 weeks.

 No hematoma to the left groin.  Patient is waiting for a bed on the cardiac 

stepdown unit. Vascular surgeries following for retroperitoneal hematoma that 

does not appear to be actively bleeding, plan for supportive care.  Patient 

remains afebrile heart rate 85, blood pressure 96/54, pulse ox 98% on room air..

 BUN 19 and creatinine 0.62.  Anticipate that patient will be able to work with 

PT and OT determine home care versus subacute rehab needs.





4/9: Patient was moved out of the ICU to a telemetry unit, she is in a better 

spurs, she responded very well to the sertraline 125 mg at bedtime, she is 

continued to have minimal cough no phlegm production, she is using Flonase and 

Claritin for postnasal drip, she denies any abdominal pain, she has not had a 

bowel movement attempted we will start the patient on lactulose 20 g orally 

twice every day, start the patient on Senokot 2 tablets at bedtime, follow-up 

with the patient very closely, we'll discuss with cardiology and cardiothoracic 

surgery team whether the patient would have TAVR next week. 





4/10: Patient sitting up in bed that she is feeling better today she continues 

to have significant constipation, she has not had a bowel movement despite all 

the medications, physical therapy will see the patient move the patient on, 

patient will likely be transferred to subacute rehabilitation initially 4 hours 

if there is no plan to doTAVR this coming week. 





4/11: Patient is seen today on the cardiac stepdown unit.  She denies having any

chest pain at this time but states she had a little bit of pressure in her chest

when she was getting back into bed.  She also states she has some cramping in 

her right leg.  She did work with physical therapy this morning and the 

recommendations are subacute rehab.  Patient is having prune juice now has 

started had MiraLAX but no bowel movement.  Patient's been afebrile, heart rate 

in 70s and 80s, blood pressure 103/69, pulse ox 98% on 2 L nasal cannula.  Blood

work reveals WBC 9.8, hemoglobin 8.1, platelet count 230.  Electrolytes are 

normal.  BUN 16 creatinine 0.54.  Total bilirubin 1.9.  Other liver function 

tests are normal.  Patient is ready for discharge to subacute rehab but we will 

hold as plan is not in place yet and patient is having ongoing constipation.  

Plan for discharge to [facility] tomorrow once arrangements are completed.  Patient

is not appropriate for inpatient rehab.





4/12: Patient states that she started having some nausea last evening.  She is 

still not had a bowel movement.  She is taking prune juice and also on 

lactulose, MiraLAX and Senokot.  She is refusing her medications today.  Patient

is afebrile, heart rate in the 80s, blood pressure 78/44, pulse ox 99 on 2 L 

nasal cannula.  Digoxin level 0.6.  Insurance authorization has been obtained 

for subacute rehab at [facility].  Plan to monitor patient overnight, continue 

bowel regime and hopefully discharge tomorrow





4/13: She continues to have nausea and constipation.  Abdominal x-ray from 

yesterday revealed fecal impaction.  Stool distends the rectum up to 8.2 cm 

wide.  Fleets enema and Dulcolax suppository ordered on top of the other stool r

egime.patient denies having any chest pain or shortness of breath.  We are 

waiting for discharge to subacute rehab which most likely will be postponed 

until tomorrow.








4/14: patient is found today sitting up and recliner and appears to be 

comfortable.  Patient did have a very large bowel movement yesterday and 

abdominal pain and nausea have resolved.  She has worked with physical therapy 

and still deemed appropriate for subacute rehab.  She denies any dizziness or 

lightheadedness when she was up.blood pressures been on the lower side and 

medication administration has been staggered to address this.  Most recently 

blood pressure 100/58.


WBC 7.6, hemoglobin 8.2.  Electrolytes and renal function normal.  Magnesium 

2.4.  Digoxin level 0.6. patient will be discharged to [facility] today in stable 

condition.


  








DISCHARGE DIAGNOSES


1.   Non ST elevation  myocardial infarction with history of prior dilated 

cardiomyopathy status post coronary angiography finding right dominant vessel, 

99% ostial stenosis, aortic pressure is 100/70, 40 mm gradient across the aortic

valve.  Left ventricular end diastolic pressure is 2530.  Stent of the RCA with

Dr. Jacobs 4/7. LAD stenosis to be addressed at a later time.


2.  Dilated cardiomyopathy.  


3.  Severe aortic valve stenosis with mild aortic regurgitation.  TAVR to be 

scheduled in the near future.


4.  Moderate to Severe mitral regurgitation. 


5.  Hyperlipidemia.  


6.  Obesity with obstructive sleep apnea and obesity hypoventilation syndrome. 


7.  Chronic hypoxic respiratory failure secondary to his Covid 19 and underlying

COPD and possible obstructive sleep apnea and hypoventilation syndrome. 


8.  Acute on chronic systolic heart failure.  


9.  Hypertension, hypertensive cardiovascular disease. 


10.  Recurrent depression and generalized anxiety disorder. 


11.  Acute retroperitoneal hematoma with acute blood loss anemia status post 

transfusion 1 unit of packed RBCs.  


12.  Severe constipation with distended rectum.  








DISCHARGE PLAN


[facility] 


Greater than 35 minutes was utilized and coordinating patient's discharge.


Impression and plan of care have been directed as dictated by the signing 

physician.  Charla Guevara nurse practitioner acting as scribe for signing 

physician.


Patient Condition at Discharge: Stable





Plan - Discharge Summary


Discharge Rx Participant: Yes


New Discharge Prescriptions: 


New


   Loratadine [Claritin] 10 mg PO DAILY  tab


   carvediloL [Coreg] 3.125 mg PO BID-W/MEALS  tab


   Atorvastatin [Lipitor] 40 mg PO HS  tab


   Nitroglycerin Sl Tabs [Nitrostat] 0.4 mg SUBLINGUAL Q5M PRN  tab


     PRN Reason: Chest Pain


   Sennosides-Docusate Sodium [Senokot-S] 2 each PO BID  tab


   Acetaminophen Tab [Tylenol] 650 mg PO Q6HR PRN  tab


     PRN Reason: Fever And/ Or Pain


   Aspirin 81 mg PO DAILY


   Lactulose [Cephulac] 20 gm PO BID  ml


   Fluticasone Nasal Spray [Flonase Nasal Spray] 2 spray EA NOSTRIL DAILY PRN  

gm


     PRN Reason: Allergy Symptoms


   Ferrous Sulfate [Iron (65 MG Elemental)] 325 mg PO BID-W/MEALS  tab


   Clopidogrel [Plavix] 75 mg PO DAILY  tab


   Budesonide-Formot 160-4.5 Mcg [Symbicort 160-4.5 Mcg Inhaler] 2 puff 

INHALATION RT-BID  gm


   Ascorbic Acid [Vitamin C] 500 mg PO AC-BID  tab


   Sertraline [Zoloft] 25 mg PO HS  tab


   Sertraline [Zoloft] 100 mg PO HS  tab





Continue


   Melatonin 3 mg PO HS


   Digoxin [Lanoxin] 125 mcg PO DAILY


   Ipratropium-Albuterol Nebulize [Duoneb 0.5 mg-3 mg/3 ml Soln] 3 ml INHALATION

RT-Q6H PRN


     PRN Reason: Shortness Of Breath


   polyethylene glycoL 3350 [Miralax] 17 gm PO DAILY PRN


     PRN Reason: Constipation


   Spironolactone [Aldactone] 25 mg PO DAILY


   Pantoprazole Sodium [Protonix] 40 mg PO DAILY


   Furosemide [Lasix] 40 mg PO BID





Discontinued


   Carvedilol [Coreg] 6.25 mg PO BID


   Sertraline [Zoloft] 50 mg PO DAILY


Discharge Medication List





Digoxin [Lanoxin] 125 mcg PO DAILY 03/31/22 [History]


Furosemide [Lasix] 40 mg PO BID 03/31/22 [History]


Ipratropium-Albuterol Nebulize [Duoneb 0.5 mg-3 mg/3 ml Soln] 3 ml INHALATION 

RT-Q6H PRN 03/31/22 [History]


Melatonin 3 mg PO HS 03/31/22 [History]


Pantoprazole Sodium [Protonix] 40 mg PO DAILY 03/31/22 [History]


Spironolactone [Aldactone] 25 mg PO DAILY 03/31/22 [History]


polyethylene glycoL 3350 [Miralax] 17 gm PO DAILY PRN 03/31/22 [History]


Acetaminophen Tab [Tylenol] 650 mg PO Q6HR PRN  tab 04/13/22 [Rx]


Ascorbic Acid [Vitamin C] 500 mg PO AC-BID  tab 04/13/22 [Rx]


Aspirin 81 mg PO DAILY 04/13/22 [Rx]


Atorvastatin [Lipitor] 40 mg PO HS  tab 04/13/22 [Rx]


Budesonide-Formot 160-4.5 Mcg [Symbicort 160-4.5 Mcg Inhaler] 2 puff INHALATION 

RT-BID  gm 04/13/22 [Rx]


Clopidogrel [Plavix] 75 mg PO DAILY  tab 04/13/22 [Rx]


Ferrous Sulfate [Iron (65 MG Elemental)] 325 mg PO BID-W/MEALS  tab 04/13/22 

[Rx]


Fluticasone Nasal Spray [Flonase Nasal Spray] 2 spray EA NOSTRIL DAILY PRN  gm 

04/13/22 [Rx]


Lactulose [Cephulac] 20 gm PO BID  ml 04/13/22 [Rx]


Loratadine [Claritin] 10 mg PO DAILY  tab 04/13/22 [Rx]


Nitroglycerin Sl Tabs [Nitrostat] 0.4 mg SUBLINGUAL Q5M PRN  tab 04/13/22 [Rx]


Sennosides-Docusate Sodium [Senokot-S] 2 each PO BID  tab 04/13/22 [Rx]


Sertraline [Zoloft] 25 mg PO HS  tab 04/13/22 [Rx]


Sertraline [Zoloft] 100 mg PO HS  tab 04/13/22 [Rx]


carvediloL [Coreg] 3.125 mg PO BID-W/MEALS  tab 04/13/22 [Rx]








Follow up Appointment(s)/Referral(s): 


Brody Jacobs DO [STAFF PHYSICIAN] - 1 Week


[facility] [NON-STAFF] - 04/19/22 4:30 pm


()


Pricilla Power MD [STAFF PHYSICIAN] - 1 Week


Michelle Knight MD [Primary Care Provider] - 1 Week


(AT [facility]


)


Discharge Disposition: TRANSFER TO SNF/ECF

## 2022-04-15 VITALS
TEMPERATURE: 98 F | SYSTOLIC BLOOD PRESSURE: 106 MMHG | RESPIRATION RATE: 18 BRPM | HEART RATE: 90 BPM | DIASTOLIC BLOOD PRESSURE: 60 MMHG

## 2022-04-15 RX ADMIN — ASPIRIN 81 MG CHEWABLE TABLET SCH MG: 81 TABLET CHEWABLE at 10:00

## 2022-04-15 RX ADMIN — BUDESONIDE AND FORMOTEROL FUMARATE DIHYDRATE SCH: 160; 4.5 AEROSOL RESPIRATORY (INHALATION) at 07:24

## 2022-04-15 RX ADMIN — Medication SCH MG: at 06:29

## 2022-04-15 RX ADMIN — PANTOPRAZOLE SODIUM SCH MG: 40 INJECTION, POWDER, FOR SOLUTION INTRAVENOUS at 10:01

## 2022-04-15 RX ADMIN — SPIRONOLACTONE SCH MG: 25 TABLET, FILM COATED ORAL at 10:00

## 2022-04-15 RX ADMIN — ALBUTEROL SULFATE SCH: 90 AEROSOL, METERED RESPIRATORY (INHALATION) at 11:02

## 2022-04-15 RX ADMIN — LACTULOSE SCH: 20 SOLUTION ORAL at 09:59

## 2022-04-15 RX ADMIN — ALBUTEROL SULFATE SCH: 90 AEROSOL, METERED RESPIRATORY (INHALATION) at 07:24

## 2022-04-15 RX ADMIN — ENOXAPARIN SODIUM SCH MG: 40 INJECTION SUBCUTANEOUS at 10:00

## 2022-04-15 RX ADMIN — OXYCODONE HYDROCHLORIDE AND ACETAMINOPHEN SCH MG: 500 TABLET ORAL at 06:29

## 2022-04-15 RX ADMIN — DIGOXIN SCH MCG: 125 TABLET ORAL at 10:00

## 2022-04-15 RX ADMIN — DOCUSATE SODIUM AND SENNOSIDES SCH: 50; 8.6 TABLET ORAL at 10:00

## 2022-04-15 RX ADMIN — METOCLOPRAMIDE SCH: 5 INJECTION, SOLUTION INTRAMUSCULAR; INTRAVENOUS at 11:55

## 2022-04-15 RX ADMIN — CLOPIDOGREL BISULFATE SCH MG: 75 TABLET ORAL at 10:00

## 2022-04-15 RX ADMIN — METOCLOPRAMIDE SCH MG: 5 INJECTION, SOLUTION INTRAMUSCULAR; INTRAVENOUS at 06:29

## 2022-04-15 RX ADMIN — LORATADINE SCH MG: 10 TABLET ORAL at 10:00

## 2022-04-15 NOTE — P.PN
Subjective


Progress Note Date: 04/15/22





HISTORY OF PRESENT ILLNESS


This is a 71-year-old female with past medical history of dilated cardiomyopathy

with ejection fraction of 15-20%, severe aortic valve stenosis, mild aortic 

regurgitation with moderate to severe mitral regurgitation, chronic systolic 

heart failure, hyperlipidemia, obesity with possible obstructive sleep apnea and

obesity hypoventilation syndrome, chronic hypoxic respiratory failure on home O2

at 2 L nasal cannula, hypertension hypertensive cardio vascular disease, 

previous Covid 19 infection.  Patient presented to [facility] 

slight elevation of troponin, EKG showed changes suggestive ischemic changes and

was started on aspirin, heparin drip and admitted to the hospital.  Patient was 

seen by cardiology. She was supposed to have a heart catheterization done as an 

outpatient along with [facility] for possible aortic valve replacement and mitral valve

and possible CABG.  Patient underwent coronary angiography finding right 

dominant vessel, 99% ostial stenosis, aortic pressure is 100/70, 40 mm gradient 

across the aortic valve.  Left ventricular end diastolic pressure is 2530.  

Patient was transferred to [facility] for PTCA and stent 

placement of the RCA which was unsuccessful.  Consult with cardiothoracic 

surgery for CABG and valve repair/replacement.





4/1: Patient is seen today in the intensive care unit.  She denies chest pain or

shortness of breath. She complains of anxiety and decreased appetite. Less cough

today.She has been seen by intensivist and cardiothoracic surgery, currently on 

heparin drip, consult was added for dental evaluation and clearance and 

panoramic CT.  Patient has been afebrile, heart rate in the 80s, blood pressure 

101/73, pulse ox 96%.  Repeat blood work reveals CBC is unremarkable.  Potassium

4.0.  Urinalysis negative for infection.  Hepatitis panel negative.  MRSA nasal 

screen is in process.  CT of the chest revealed cardiomegaly with suspected 

pulmonary edema and moderate to marked right sided pleural effusion.  Associated

infection can't be excluded.


Echocardiogram reveals EF of 25-30% with mild concentric left ventricular 

hypertrophy, severe global hypokinesia of the LV, severe aortic stenosis with 

gradient 78.27 mm a new 3/47.94 mmHg, trace mitral regurgitation, mild mitral 

stenosis, mild tricuspid regurgitation, mild pulmonary hypertension, RVSP 43.33 

mmHg.


Carotid ultrasound revealed less than 50% stenosis bilateral carotid systems.





4/2: Patient is sitting up in bed she underwent CTA of the chest for evaluation 

of dissection of the right coronary artery, she denies any chest pain at this 

time, she has no shortness breath, she has no abdominal pain, nausea or 

vomiting, she continues to have some coughing noted from production, she 

continues to be somewhat depressed and anxious and she is not sure what to do, 

she is missing her daughter she stated and she doesn't think her daughter is 

able to come and see her.





4/3: Patient is sitting on the bed in no apparent distress, she continues to be 

quite depressed and anxious about her situation, she has not made up her mind, I

had a long conversation with Dr. Jacobs regarding the plan to pursue the [facility] care at this time, she was deemed high surgical risk per cardiovascular 

surgery service at the current [place], and she would benefit from a 

transcatheter aortic valve replacement plus reattempted PCI of the RCA, her CT 

angiography of the chest did not show evidence of any dissection of the thoracic

aorta , patient is maintained on Zoloft 100 mg every day, she is mentioned in 

ICU, and the plan to the PCI hopefully in the next 24 hours patient and her 

daughter Janneth are in agreement for conservative approach and they understand the

risks of the procedure and that she will have the TAVR at a later date or during

this hospital admission depends on her symptoms, we will consult Psychiatry for 

depression and possible bipolar disorder.





4/4: Patient remains in intensive care unit, afebrile, heart rate in the 80s, 

blood pressure 101/58, pulse ox 97% on room air.  Patient denies having any 

chest pain but continues to have anxiety issues.  Consult with psychiatry is 

pending.  Patient has been scheduled for CT TAVR protocol for today.  Repeat 

blood work reveals WBC 15, hemoglobin 8.6, platelet count 172.  Sodium 134, BUN 

41 creatinine 1.2.  Blood sugar 149.  Total bilirubin 1.4 otherwise liver 

function tests are normal.  Nasal MRSA screen is negative.





4/5: Patient remains in the intensive care unit.  Patient underwent CAT scan 

yesterday for TAVR protoxol and was found to have moderate size right pelvic 

retroperitoneal hematoma extending into the lower abdomen presumed from att

empted right groin catheter insertion.  Consult was added for vascular surgery 

with plan to continue supportive care, no plan for surgical intervention.  

Heparin was discontinued.  Patient was transfused 1 unit of packed RBCs for 

hemoglobin of 6.9 with repeat hemoglobin of 7.6.


Patient has been afebrile, heart rate 70, blood pressure 109/62, pulse ox 97%.  

Cardiac monitor is sinus rhythm.  Patient has also been seen and followed by 

psychiatry for depression and acute bereavement with the loss of her , 

recommendations Zoloft 125 mg daily at bedtime.





4/6: Patient remains in the intensive care unit.  No signs of bleeding and no 

worsening hematoma with hemoglobin stable at 7.6. Vascular surgery has cleared 

patient to resume heparin if needed, continue to monitor for bleeding and daily 

CBC.  Plan is for Dr. Jacobs will proceed with intervention tomorrow.  Patient

is also followed by intensivist, cardiothoracic surgery and psychiatry.





4/7: Patient is status post stent in the RCA today with Dr. Jacobs.  She is 

seen postprocedure and is comfortable.  She denies having any chest pain.  

Anxiety is controlled.  Approach was from the left groin, no bleeding or 

hematoma noted.  Patient remains afebrile, heart rate in the 80s, blood pressure

92/57, pulse ox 94% on 2 L nasal cannula.  Repeat hemoglobin is stable at 7.5, 

WBC 11.5.  Electrolytes are normal, BUN 21 creatinine 0.61.





4/8: Patient remains in the intensive care unit status post stent of the RCA 

yesterday.  Patient has moderate to severe LAD lesion with significant FFR to be

addressed at a later time as well asTAVR will be planned for the next 1-2 weeks.

 No hematoma to the left groin.  Patient is waiting for a bed on the cardiac 

stepdown unit. Vascular surgeries following for retroperitoneal hematoma that 

does not appear to be actively bleeding, plan for supportive care.  Patient 

remains afebrile heart rate 85, blood pressure 96/54, pulse ox 98% on room air..

 BUN 19 and creatinine 0.62.  Anticipate that patient will be able to work with 

PT and OT determine home care versus subacute rehab needs.





4/9: Patient was moved out of the ICU to a telemetry unit, she is in a better 

spurs, she responded very well to the sertraline 125 mg at bedtime, she is 

continued to have minimal cough no phlegm production, she is using Flonase and 

Claritin for postnasal drip, she denies any abdominal pain, she has not had a 

bowel movement attempted we will start the patient on lactulose 20 g orally 

twice every day, start the patient on Senokot 2 tablets at bedtime, follow-up 

with the patient very closely, we'll discuss with cardiology and cardiothoracic 

surgery team whether the patient would have TAVR next week. 





4/10: Patient sitting up in bed that she is feeling better today she continues 

to have significant constipation, she has not had a bowel movement despite all 

the medications, physical therapy will see the patient move the patient on, 

patient will likely be transferred to subacute rehabilitation initially 4 hours 

if there is no plan to doTAVR this coming week. 





4/11: Patient is seen today on the cardiac stepdown unit.  She denies having any

chest pain at this time but states she had a little bit of pressure in her chest

when she was getting back into bed.  She also states she has some cramping in 

her right leg.  She did work with physical therapy this morning and the 

recommendations are subacute rehab.  Patient is having prune juice now has 

started had MiraLAX but no bowel movement.  Patient's been afebrile, heart rate 

in 70s and 80s, blood pressure 103/69, pulse ox 98% on 2 L nasal cannula.  Blood

work reveals WBC 9.8, hemoglobin 8.1, platelet count 230.  Electrolytes are 

normal.  BUN 16 creatinine 0.54.  Total bilirubin 1.9.  Other liver function 

tests are normal.  Patient is ready for discharge to subacute rehab but we will 

hold as plan is not in place yet and patient is having ongoing constipation.  

Plan for discharge to [facility] tomorrow once arrangements are completed.  Patient

is not appropriate for inpatient rehab.





4/12: Patient states that she started having some nausea last evening.  She is 

still not had a bowel movement.  She is taking prune juice and also on lactulo

se, MiraLAX and Senokot.  She is refusing her medications today.  Patient is 

afebrile, heart rate in the 80s, blood pressure 78/44, pulse ox 99 on 2 L nasal 

cannula.  Digoxin level 0.6.  Insurance authorization has been obtained for 

subacute rehab at [facility].  Plan to monitor patient overnight, continue bowel 

regime and hopefully discharge tomorrow





4/13: She continues to have nausea and constipation.  Abdominal x-ray from 

yesterday revealed fecal impaction.  Stool distends the rectum up to 8.2 cm 

wide.  Dulcolax suppository, fleets enema ordered.  Breathing status is stable. 

Anticipate discharge to [facility] tomorrow.  Patient did have large bowel movement

yesterday





4/14: patient is found today sitting up and recliner and appears to be 

comfortable.  Patient did have a very large bowel movement yesterday and 

abdominal pain and nausea have resolved.  She has worked with physical therapy a

nd still deemed appropriate for subacute rehab.  She denies any dizziness or 

lightheadedness when she was up.blood pressures been on the lower side and 

medication administration has been staggered to address this.  Most recently 

blood pressure 100/58.


WBC 7.6, hemoglobin 8.2.  Electrolytes and renal function normal.  Magnesium 

2.4.  Digoxin level 0.6.  Patient was prepared for discharge to [facility] however 

Covid 19 test which was required for the nursing home came back positive and 

discharge plan needed to be reevaluated.  [facility] may take patient.  We will hold discharge

until discharging plan is completed.





4/15: Patient is stating that she is feeling down and depressed because of the 

current circumstances and now being positive for Covid 19.  She has had it in 

the past.  Her breathing is stable and she has no symptoms due to Covid.  She 

has not had a repeat bowel movement but she is on a bowel regime.  She is agreea

ble to go to [facility].  Patient has been afebrile, heart rate 86, 

blood pressure 95/50, pulse ox 99% on 1 L nasal cannula.  Patient will be 

discharged once arrangements are completed.





REVIEW OF SYSTEMS


Constitutional: No fever, no chills, no night sweats.  No weight change.  

Positive for generalized weakness, positive for fatigue or lethargy.  No daytime

sleepiness.


HEENT: No headache.  No blurred vision or double vision, no loss of vision.  No 

loss of Hearing, no ringing in the ears, no dizziness.  No nasal drainage or 

congestion.  No epistaxis.  No sore throat.


Lungs: Positive for shortness of breath, positive for cough, no sputum 

production.  No wheezing.  Reports dyspnea on exertion.


Cardiovascular: No chest pain, no lower extremity edema.  No palpitations.  No 

paroxysmal nocturnal dyspnea.  No orthopnea.  No lightheadedness or dizziness.  

No syncopal episodes.


Abdominal: No right lower quadrant abdominal pain.  No nausea, vomiting.  No 

diarrhea.  Reports severe constipation has resolved.  No bloody or tarry stools.

 Reports loss of appetite.


Genitourinary: No dysuria, increased frequency, urgency.  No urinary retention.


Musculoskeletal: No myalgias.  Generalized muscle weakness, reported gait 

dysfunction requiring significant assistance, no frequent falls.  Positive for 

back pain.  No neck pain.


Integumentary: No wounds, no lesions.  No rash or pruritus.  Positive for 

bruising.  No change in hair or nails.


Neurologic: No aphasia. No facial droop. No change in mentation. No head injury.

No headache. No paralysis. No paresthesia.


Psychiatric: Positive for depression.  Reports anxiety.  Positive for mood 

swings


Endocrine: No abnormal blood sugars.  Positive for weight change.   





PHYSICAL EXAMINATION


Gen: This is a 71-year-old  female.


HEENT: Head is atraumatic, normocephalic. Pupils equal, round. Sclerae is 

anicteric. 


NECK: Supple. No JVD. No lymphadenopathy. No thyromegaly. 


LUNGS: Decreased breath sounds at the bases, decreased tactile fremitus at the 

right lower third, few rhonchi, no expiratory wheeze, no chest wall tenderness. 

No intercostal retractions.


HEART: First heart sound is depressed, second heart sound is normal, systolic 

ejection murmur 3/6 at the right upper sternal border radiating to the base of 

the neck, systolic ejection murmur 2/6 at the apex rating to the axilla.


ABDOMEN: Soft, +tenderness generalized, nondistended, positive bowel sounds, 

multiple ecchymosis.


EXTREMITIES: 1+ pedal edema.  No calf tenderness.  Dorsalis pedis +1 

bilaterally. 


NEUROLOGICAL: Patient is awake, alert and oriented x3. Cranial nerves 2 through 

12 are grossly intact.,  Cranial nerves II-12 appear grossly intact, muscle 

power 4 out of 5 in upper and lower extremities bilaterally.





ASSESSMENT AND PLAN


1.   Non ST elevation  myocardial infarction with history of prior dilated 

cardiomyopathy status post coronary angiography finding right dominant vessel, 9

9% ostial stenosis, aortic pressure is 100/70, 40 mm gradient across the aortic 

valve.  Left ventricular end diastolic pressure is 2530.  Patient was 

transferred to [facility] for PTCA and stent placement of the 

RCA which was unsuccessful.  Consult with cardiothoracic surgery appreciated.  

Intensivist consultation appreciated.  Continue aspirin 81 mg daily, PLAVIX 75 

MG orally daily, Coreg 6.25 mg twice daily,continue Atorvastatin 40 mg orally 

daily, now with successful   Stent of the RCA with Dr. Jacobs 4/7. LAD 

stenosis to be addressed at a later time.





2.  Dilated cardiomyopathy.  continue patient on carvedilol 3.125 mg orally 

twice every day, digoxin 125 g daily, we will continue with spironolactone 25 

mg orally daily.  Digoxin level .





3.  Severe aortic valve stenosis with mild aortic regurgitation.  TAVR to be 

scheduled in the near future.





4.  Moderate to Severe mitral regurgitation. likely will continue with 

conservative management no plan for mitral valve repair. 





5.  Hyperlipidemia.   She was started on atorvastatin 40 mg once every day.





6.  Obesity with obstructive sleep apnea and obesity hypoventilation syndrome.  

Patient has not had sleep study done yet. 





7.  Chronic hypoxic respiratory failure secondary to his Covid 19 and underlying

COPD and possible obstructive sleep apnea and hypoventilation syndrome.  Patient

is normally on 2 L nasal cannula.





8.  Acute on chronic systolic heart failure.  Off Lasix, continue Coreg, 

Aldactone 25 mg daily.  





9.  Hypertension, hypertensive cardiovascular disease. continue carvedilol 3.125

mg orally twice every day patient is not on ACE-I or ARB due to severe 

hypertension,  





10.  Recurrent depression and generalized anxiety disorder.  Consult consult 

with psychiatry appreciated.  Patient on Zoloft 125 mg at bedtime


 


11.  DVT prophylaxis. we will continue with Lovenox 40 mg SC daily 





12.  GI  prophylaxis.  Protonix 40 mg IV push daily. 





13.  Acute retroperitoneal hematoma with acute blood loss anemia status post 

transfusion 1 unit of packed RBCs.  Consult with vascular surgery appreciated. 

No plan for any surgical interventions.  Monitor closely for bleeding.





14.  Severe constipation, resolved.  Continue patient on Senokot 2 tablet at 

bedtime along with lactulose 20 g orally twice every day, MiraLAX 17 g daily, 

prune juice, and fleets enema..





Full code.








DISCHARGE PLAN


To be determined





Impression and plan of care have been directed as dictated by the signing 

physician.  Charla Guevara nurse practitioner acting as scribe for signing 

physician.








Objective





- Vital Signs


Vital signs: 


                                   Vital Signs











Temp  98.2 F   04/15/22 09:57


 


Pulse  86   04/15/22 09:57


 


Resp  17   04/15/22 09:57


 


BP  95/50   04/15/22 09:57


 


Pulse Ox  99   04/15/22 09:57








                                 Intake & Output











 04/14/22 04/15/22 04/15/22





 18:59 06:59 18:59


 


Output Total 500 50 


 


Balance -500 -50 


 


Weight 111.9 kg  


 


Output:   


 


  Urine 500 50 


 


Other:   


 


  Voiding Method Incontinent Incontinent 





 External Catheter External Catheter 


 


  # Voids  1 








                       ABP, PAP, CO, CI - Last Documented











Arterial Blood Pressure        107/66

















- Labs


CBC & Chem 7: 


                                 04/14/22 08:01





                                 04/14/22 08:01


Labs: 


                  Abnormal Lab Results - Last 24 Hours (Table)











  04/14/22 Range/Units





  13:21 


 


Coronavirus (PCR)  Detected A  (Not Detectd)

## 2022-04-15 NOTE — P.DS
Providers


Date of admission: 


03/31/22 09:14





Expected date of discharge: 04/15/22


Attending physician: 


Michelle Knight





Consults: 





                                        





03/31/22 14:31


Consult Physician Routine 


   Consulting Provider: Benita Romero


   Consult Reason/Comments: cardiac surgery clearance


   Do you want consulting provider notified?: Yes


   Placement Type Exists?: Yes





03/31/22 14:32


Consult Physician Routine 


   Consulting Provider: Mino Lindo


   Consult Reason/Comments: Pulmonary clearance for cardiac surgery


   Do you want consulting provider notified?: Yes


   Placement Type Exists?: Yes





03/31/22 23:03


Consult Physician Routine 


   Consulting Provider: Delvin Vega


   Consult Reason/Comments: re: CAD


   Do you want consulting provider notified?: Already Contacted





04/01/22 13:59


Consult Physician Routine 


   Consulting Provider: Filiberto Dior


   Consult Reason/Comments: CAD/AS


   Do you want consulting provider notified?: Already Contacted





04/03/22 12:37


Consult Physician Routine 


   Consulting Provider: Romeo Lozano


   Consult Reason/Comments: Psychiatry


   Do you want consulting provider notified?: Yes





04/07/22 10:56


Consult Physician Routine 


   Consulting Provider: [facility]


   Consult Reason/Comments: Post Interventional patient


   Do you want consulting provider notified?: Already Contacted





04/12/22 08:59


Consult Physician Routine 


   Consulting Provider: Karsten Marx


   Consult Reason/Comments: IP REHAB


   Do you want consulting provider notified?: Yes











Primary care physician: 


Michelle Knight





Hospital Course: 





HISTORY OF PRESENT ILLNESS


This is a 71-year-old female with past medical history of dilated cardiomyopathy

with ejection fraction of 15-20%, severe aortic valve stenosis, mild aortic 

regurgitation with moderate to severe mitral regurgitation, chronic systolic 

heart failure, hyperlipidemia, obesity with possible obstructive sleep apnea and

obesity hypoventilation syndrome, chronic hypoxic respiratory failure on home O2

at 2 L nasal cannula, hypertension hypertensive cardio vascular disease, 

previous Covid 19 infection.  Patient presented to [facility] 

slight elevation of troponin, EKG showed changes suggestive ischemic changes and

was started on aspirin, heparin drip and admitted to the hospital.  Patient was 

seen by cardiology. She was supposed to have a heart catheterization done as an 

outpatient along with [facility] for possible aortic valve replacement and mitral valve

and possible CABG.  Patient underwent coronary angiography finding right 

dominant vessel, 99% ostial stenosis, aortic pressure is 100/70, 40 mm gradient 

across the aortic valve.  Left ventricular end diastolic pressure is 2530.  

Patient was transferred to [facility] for PTCA and stent 

placement of the RCA which was unsuccessful.  Consult with cardiothoracic 

surgery for CABG and valve repair/replacement.





4/1: Patient is seen today in the intensive care unit.  She denies chest pain or

shortness of breath. She complains of anxiety and decreased appetite. Less cough

today.She has been seen by intensivist and cardiothoracic surgery, currently on 

heparin drip, consult was added for dental evaluation and clearance and 

panoramic CT.  Patient has been afebrile, heart rate in the 80s, blood pressure 

101/73, pulse ox 96%.  Repeat blood work reveals CBC is unremarkable.  Potassium

4.0.  Urinalysis negative for infection.  Hepatitis panel negative.  MRSA nasal 

screen is in process.  CT of the chest revealed cardiomegaly with suspected 

pulmonary edema and moderate to marked right sided pleural effusion.  Associated

infection can't be excluded.


Echocardiogram reveals EF of 25-30% with mild concentric left ventricular 

hypertrophy, severe global hypokinesia of the LV, severe aortic stenosis with 

gradient 78.27 mm a new 3/47.94 mmHg, trace mitral regurgitation, mild mitral 

stenosis, mild tricuspid regurgitation, mild pulmonary hypertension, RVSP 43.33 

mmHg.


Carotid ultrasound revealed less than 50% stenosis bilateral carotid systems.





4/2: Patient is sitting up in bed she underwent CTA of the chest for evaluation 

of dissection of the right coronary artery, she denies any chest pain at this 

time, she has no shortness breath, she has no abdominal pain, nausea or 

vomiting, she continues to have some coughing noted from production, she 

continues to be somewhat depressed and anxious and she is not sure what to do, 

she is missing her daughter she stated and she doesn't think her daughter is 

able to come and see her.





4/3: Patient is sitting on the bed in no apparent distress, she continues to be 

quite depressed and anxious about her situation, she has not made up her mind, I

had a long conversation with Dr. Jacobs regarding the plan to pursue the 

patient care at this time, she was deemed high surgical risk per cardiovascular 

surgery service at the current [facility], and she would benefit from a 

transcatheter aortic valve replacement plus reattempted PCI of the RCA, her CT 

angiography of the chest did not show evidence of any dissection of the thoracic

aorta , patient is maintained on Zoloft 100 mg every day, she is mentioned in 

ICU, and the plan to the PCI hopefully in the next 24 hours patient and her 

daughter Janneth are in agreement for conservative approach and they understand the

risks of the procedure and that she will have the TAVR at a later date or during

this hospital admission depends on her symptoms, we will consult Psychiatry for 

depression and possible bipolar disorder.





4/4: Patient remains in intensive care unit, afebrile, heart rate in the 80s, 

blood pressure 101/58, pulse ox 97% on room air.  Patient denies having any 

chest pain but continues to have anxiety issues.  Consult with psychiatry is 

pending.  Patient has been scheduled for CT TAVR protocol for today.  Repeat 

blood work reveals WBC 15, hemoglobin 8.6, platelet count 172.  Sodium 134, BUN 

41 creatinine 1.2.  Blood sugar 149.  Total bilirubin 1.4 otherwise liver 

function tests are normal.  Nasal MRSA screen is negative.





4/5: Patient remains in the intensive care unit.  Patient underwent CAT scan 

yesterday for TAVR protoxol and was found to have moderate size right pelvic 

retroperitoneal hematoma extending into the lower abdomen presumed from 

attempted right groin catheter insertion.  Consult was added for vascular 

surgery with plan to continue supportive care, no plan for surgical 

intervention.  Heparin was discontinued.  Patient was transfused 1 unit of 

packed RBCs for hemoglobin of 6.9 with repeat hemoglobin of 7.6.


Patient has been afebrile, heart rate 70, blood pressure 109/62, pulse ox 97%.  

Cardiac monitor is sinus rhythm.  Patient has also been seen and followed by 

psychiatry for depression and acute bereavement with the loss of her , 

recommendations Zoloft 125 mg daily at bedtime.





4/6: Patient remains in the intensive care unit.  No signs of bleeding and no 

worsening hematoma with hemoglobin stable at 7.6. Vascular surgery has cleared 

patient to resume heparin if needed, continue to monitor for bleeding and daily 

CBC.  Plan is for Dr. Jacobs will proceed with intervention tomorrow.  Patient

is also followed by intensivist, cardiothoracic surgery and psychiatry.





4/7: Patient is status post stent in the RCA today with Dr. Jacobs.  She is 

seen postprocedure and is comfortable.  She denies having any chest pain.  

Anxiety is controlled.  Approach was from the left groin, no bleeding or 

hematoma noted.  Patient remains afebrile, heart rate in the 80s, blood pressure

92/57, pulse ox 94% on 2 L nasal cannula.  Repeat hemoglobin is stable at 7.5, 

WBC 11.5.  Electrolytes are normal, BUN 21 creatinine 0.61.





4/8: Patient remains in the intensive care unit status post stent of the RCA 

yesterday.  Patient has moderate to severe LAD lesion with significant FFR to be

addressed at a later time as well asTAVR will be planned for the next 1-2 weeks.

 No hematoma to the left groin.  Patient is waiting for a bed on the cardiac 

stepdown unit. Vascular surgeries following for retroperitoneal hematoma that 

does not appear to be actively bleeding, plan for supportive care.  Patient 

remains afebrile heart rate 85, blood pressure 96/54, pulse ox 98% on room air..

 BUN 19 and creatinine 0.62.  Anticipate that patient will be able to work with 

PT and OT determine home care versus subacute rehab needs.





4/9: Patient was moved out of the ICU to a telemetry unit, she is in a better 

spurs, she responded very well to the sertraline 125 mg at bedtime, she is 

continued to have minimal cough no phlegm production, she is using Flonase and 

Claritin for postnasal drip, she denies any abdominal pain, she has not had a 

bowel movement attempted we will start the patient on lactulose 20 g orally 

twice every day, start the patient on Senokot 2 tablets at bedtime, follow-up 

with the patient very closely, we'll discuss with cardiology and cardiothoracic 

surgery team whether the patient would have TAVR next week. 





4/10: Patient sitting up in bed that she is feeling better today she continues 

to have significant constipation, she has not had a bowel movement despite all 

the medications, physical therapy will see the patient move the patient on, 

patient will likely be transferred to subacute rehabilitation initially 4 hours 

if there is no plan to doTAVR this coming week. 





4/11: Patient is seen today on the cardiac stepdown unit.  She denies having any

chest pain at this time but states she had a little bit of pressure in her chest

when she was getting back into bed.  She also states she has some cramping in 

her right leg.  She did work with physical therapy this morning and the 

recommendations are subacute rehab.  Patient is having prune juice now has 

started had MiraLAX but no bowel movement.  Patient's been afebrile, heart rate 

in 70s and 80s, blood pressure 103/69, pulse ox 98% on 2 L nasal cannula.  Blood

work reveals WBC 9.8, hemoglobin 8.1, platelet count 230.  Electrolytes are 

normal.  BUN 16 creatinine 0.54.  Total bilirubin 1.9.  Other liver function 

tests are normal.  Patient is ready for discharge to subacute rehab but we will 

hold as plan is not in place yet and patient is having ongoing constipation.  

Plan for discharge to [facility] tomorrow once arrangements are completed.  Patient

is not appropriate for inpatient rehab.





4/12: Patient states that she started having some nausea last evening.  She is 

still not had a bowel movement.  She is taking prune juice and also on 

lactulose, MiraLAX and Senokot.  She is refusing her medications today.  Patient

is afebrile, heart rate in the 80s, blood pressure 78/44, pulse ox 99 on 2 L 

nasal cannula.  Digoxin level 0.6.  Insurance authorization has been obtained 

for subacute rehab at [facility].  Plan to monitor patient overnight, continue 

bowel regime and hopefully discharge tomorrow





4/13: She continues to have nausea and constipation.  Abdominal x-ray from 

yesterday revealed fecal impaction.  Stool distends the rectum up to 8.2 cm 

wide.  Fleets enema and Dulcolax suppository ordered on top of the other stool 

regime.patient denies having any chest pain or shortness of breath.  We are 

waiting for discharge to subacute rehab which most likely will be postponed 

until tomorrow.








4/14: patient is found today sitting up and recliner and appears to be 

comfortable.  Patient did have a very large bowel movement yesterday and 

abdominal pain and nausea have resolved.  She has worked with physical therapy 

and still deemed appropriate for subacute rehab.  She denies any dizziness or 

lightheadedness when she was up.blood pressures been on the lower side and 

medication administration has been staggered to address this.  Most recently 

blood pressure 100/58.


WBC 7.6, hemoglobin 8.2.  Electrolytes and renal function normal.  Magnesium 

2.4.  Digoxin level 0.6. patient will be discharged to [facility] today in stable 

condition.


  


4/15: Patient is stating that she is feeling down and depressed because of the 

current circumstances and now being positive for Covid 19.  She has had it in 

the past.  Her breathing is stable and she has no symptoms due to Covid.  She 

has not had a repeat bowel movement but she is on a bowel regime.  She is 

agreeable to go to [facility].  Patient has been afebrile, heart rate 

86, blood pressure 95/50, pulse ox 99% on 1 L nasal cannula.  Patient will be 

discharged once arrangements are completed.





DISCHARGE DIAGNOSES


1.   Non ST elevation  myocardial infarction with history of prior dilated 

cardiomyopathy status post coronary angiography finding right dominant vessel, 

99% ostial stenosis, aortic pressure is 100/70, 40 mm gradient across the aortic

valve.  Left ventricular end diastolic pressure is 2530.  Stent of the RCA with

Dr. Jacobs 4/7. LAD stenosis to be addressed at a later time.


2.  Dilated cardiomyopathy.  


3.  Severe aortic valve stenosis with mild aortic regurgitation.  TAVR to be 

scheduled in the near future.


4.  Moderate to Severe mitral regurgitation. 


5.  Hyperlipidemia.  


6.  Obesity with obstructive sleep apnea and obesity hypoventilation syndrome. 


7.  Chronic hypoxic respiratory failure secondary to his Covid 19 and underlying

COPD and possible obstructive sleep apnea and hypoventilation syndrome. 


8.  Acute on chronic systolic heart failure.  


9.  Hypertension, hypertensive cardiovascular disease. 


10.  Recurrent depression and generalized anxiety disorder. 


11.  Acute retroperitoneal hematoma with acute blood loss anemia status post 

transfusion 1 unit of packed RBCs.  


12.  Severe constipation with distended rectum.  








DISCHARGE PLAN


TBD


Greater than 35 minutes was utilized and coordinating patient's discharge.


Impression and plan of care have been directed as dictated by the signing 

physician.  Charla Guevara nurse practitioner acting as scribe for signing 

physician.


Patient Condition at Discharge: Stable





Plan - Discharge Summary


Discharge Rx Participant: Yes


New Discharge Prescriptions: 


New


   Loratadine [Claritin] 10 mg PO DAILY  tab


   carvediloL [Coreg] 3.125 mg PO BID-W/MEALS  tab


   Atorvastatin [Lipitor] 40 mg PO HS  tab


   Nitroglycerin Sl Tabs [Nitrostat] 0.4 mg SUBLINGUAL Q5M PRN  tab


     PRN Reason: Chest Pain


   Sennosides-Docusate Sodium [Senokot-S] 2 each PO BID  tab


   Acetaminophen Tab [Tylenol] 650 mg PO Q6HR PRN  tab


     PRN Reason: Fever And/ Or Pain


   Aspirin 81 mg PO DAILY


   Lactulose [Cephulac] 20 gm PO BID  ml


   Fluticasone Nasal Spray [Flonase Nasal Spray] 2 spray EA NOSTRIL DAILY PRN  

gm


     PRN Reason: Allergy Symptoms


   Ferrous Sulfate [Iron (65 MG Elemental)] 325 mg PO BID-W/MEALS  tab


   Clopidogrel [Plavix] 75 mg PO DAILY  tab


   Budesonide-Formot 160-4.5 Mcg [Symbicort 160-4.5 Mcg Inhaler] 2 puff 

INHALATION RT-BID  gm


   Ascorbic Acid [Vitamin C] 500 mg PO AC-BID  tab


   Sertraline [Zoloft] 25 mg PO HS  tab


   Sertraline [Zoloft] 100 mg PO HS  tab





Continue


   Melatonin 3 mg PO HS


   Digoxin [Lanoxin] 125 mcg PO DAILY


   Ipratropium-Albuterol Nebulize [Duoneb 0.5 mg-3 mg/3 ml Soln] 3 ml INHALATION

RT-Q6H PRN


     PRN Reason: Shortness Of Breath


   polyethylene glycoL 3350 [Miralax] 17 gm PO DAILY PRN


     PRN Reason: Constipation


   Spironolactone [Aldactone] 25 mg PO DAILY


   Pantoprazole Sodium [Protonix] 40 mg PO DAILY


   Furosemide [Lasix] 40 mg PO BID





Discontinued


   Carvedilol [Coreg] 6.25 mg PO BID


   Sertraline [Zoloft] 50 mg PO DAILY


Discharge Medication List





Digoxin [Lanoxin] 125 mcg PO DAILY 03/31/22 [History]


Furosemide [Lasix] 40 mg PO BID 03/31/22 [History]


Ipratropium-Albuterol Nebulize [Duoneb 0.5 mg-3 mg/3 ml Soln] 3 ml INHALATION 

RT-Q6H PRN 03/31/22 [History]


Melatonin 3 mg PO HS 03/31/22 [History]


Pantoprazole Sodium [Protonix] 40 mg PO DAILY 03/31/22 [History]


Spironolactone [Aldactone] 25 mg PO DAILY 03/31/22 [History]


polyethylene glycoL 3350 [Miralax] 17 gm PO DAILY PRN 03/31/22 [History]


Acetaminophen Tab [Tylenol] 650 mg PO Q6HR PRN  tab 04/13/22 [Rx]


Ascorbic Acid [Vitamin C] 500 mg PO AC-BID  tab 04/13/22 [Rx]


Aspirin 81 mg PO DAILY 04/13/22 [Rx]


Atorvastatin [Lipitor] 40 mg PO HS  tab 04/13/22 [Rx]


Budesonide-Formot 160-4.5 Mcg [Symbicort 160-4.5 Mcg Inhaler] 2 puff INHALATION 

RT-BID  gm 04/13/22 [Rx]


Clopidogrel [Plavix] 75 mg PO DAILY  tab 04/13/22 [Rx]


Ferrous Sulfate [Iron (65 MG Elemental)] 325 mg PO BID-W/MEALS  tab 04/13/22 

[Rx]


Fluticasone Nasal Spray [Flonase Nasal Spray] 2 spray EA NOSTRIL DAILY PRN  gm 

04/13/22 [Rx]


Lactulose [Cephulac] 20 gm PO BID  ml 04/13/22 [Rx]


Loratadine [Claritin] 10 mg PO DAILY  tab 04/13/22 [Rx]


Nitroglycerin Sl Tabs [Nitrostat] 0.4 mg SUBLINGUAL Q5M PRN  tab 04/13/22 [Rx]


Sennosides-Docusate Sodium [Senokot-S] 2 each PO BID  tab 04/13/22 [Rx]


Sertraline [Zoloft] 25 mg PO HS  tab 04/13/22 [Rx]


Sertraline [Zoloft] 100 mg PO HS  tab 04/13/22 [Rx]


carvediloL [Coreg] 3.125 mg PO BID-W/MEALS  tab 04/13/22 [Rx]








Follow up Appointment(s)/Referral(s): 


Michelle Knight MD [Primary Care Provider] - 1 Week


(AT [facility]


)


Brody Jacobs DO [STAFF PHYSICIAN] - 1 Week


[facility] [NON-STAFF] - 04/19/22 4:30 pm


()


Pricilla Power MD [STAFF PHYSICIAN] - 1 Week


Discharge Disposition: TRANSFER TO SNF/ECF

## 2022-04-15 NOTE — P.PN
Subjective


Progress Note Date: 04/14/22





HISTORY OF PRESENT ILLNESS


This is a 71-year-old female with past medical history of dilated cardiomyopathy

with ejection fraction of 15-20%, severe aortic valve stenosis, mild aortic 

regurgitation with moderate to severe mitral regurgitation, chronic systolic 

heart failure, hyperlipidemia, obesity with possible obstructive sleep apnea and

obesity hypoventilation syndrome, chronic hypoxic respiratory failure on home O2

at 2 L nasal cannula, hypertension hypertensive cardio vascular disease, 

previous Covid 19 infection.  Patient presented to [facility] 

slight elevation of troponin, EKG showed changes suggestive ischemic changes and

was started on aspirin, heparin drip and admitted to the hospital.  Patient was 

seen by cardiology. She was supposed to have a heart catheterization done as an 

outpatient along with [facility] for possible aortic valve replacement and mitral valve

and possible CABG.  Patient underwent coronary angiography finding right 

dominant vessel, 99% ostial stenosis, aortic pressure is 100/70, 40 mm gradient 

across the aortic valve.  Left ventricular end diastolic pressure is 2530.  

Patient was transferred to [facility] for PTCA and stent 

placement of the RCA which was unsuccessful.  Consult with cardiothoracic 

surgery for CABG and valve repair/replacement.





4/1: Patient is seen today in the intensive care unit.  She denies chest pain or

shortness of breath. She complains of anxiety and decreased appetite. Less cough

today.She has been seen by intensivist and cardiothoracic surgery, currently on 

heparin drip, consult was added for dental evaluation and clearance and 

panoramic CT.  Patient has been afebrile, heart rate in the 80s, blood pressure 

101/73, pulse ox 96%.  Repeat blood work reveals CBC is unremarkable.  Potassium

4.0.  Urinalysis negative for infection.  Hepatitis panel negative.  MRSA nasal 

screen is in process.  CT of the chest revealed cardiomegaly with suspected 

pulmonary edema and moderate to marked right sided pleural effusion.  Associated

infection can't be excluded.


Echocardiogram reveals EF of 25-30% with mild concentric left ventricular 

hypertrophy, severe global hypokinesia of the LV, severe aortic stenosis with 

gradient 78.27 mm a new 3/47.94 mmHg, trace mitral regurgitation, mild mitral 

stenosis, mild tricuspid regurgitation, mild pulmonary hypertension, RVSP 43.33 

mmHg.


Carotid ultrasound revealed less than 50% stenosis bilateral carotid systems.





4/2: Patient is sitting up in bed she underwent CTA of the chest for evaluation 

of dissection of the right coronary artery, she denies any chest pain at this 

time, she has no shortness breath, she has no abdominal pain, nausea or 

vomiting, she continues to have some coughing noted from production, she 

continues to be somewhat depressed and anxious and she is not sure what to do, 

she is missing her daughter she stated and she doesn't think her daughter is 

able to come and see her.





4/3: Patient is sitting on the bed in no apparent distress, she continues to be 

quite depressed and anxious about her situation, she has not made up her mind, I

had a long conversation with Dr. Jacobs regarding the plan to pursue the [facility] care at this time, she was deemed high surgical risk per cardiovascular 

surgery service at the current [place], and she would benefit from a 

transcatheter aortic valve replacement plus reattempted PCI of the RCA, her CT 

angiography of the chest did not show evidence of any dissection of the thoracic

aorta , patient is maintained on Zoloft 100 mg every day, she is mentioned in 

ICU, and the plan to the PCI hopefully in the next 24 hours patient and her 

daughter Janneth are in agreement for conservative approach and they understand the

risks of the procedure and that she will have the TAVR at a later date or during

this hospital admission depends on her symptoms, we will consult Psychiatry for 

depression and possible bipolar disorder.





4/4: Patient remains in intensive care unit, afebrile, heart rate in the 80s, 

blood pressure 101/58, pulse ox 97% on room air.  Patient denies having any 

chest pain but continues to have anxiety issues.  Consult with psychiatry is 

pending.  Patient has been scheduled for CT TAVR protocol for today.  Repeat 

blood work reveals WBC 15, hemoglobin 8.6, platelet count 172.  Sodium 134, BUN 

41 creatinine 1.2.  Blood sugar 149.  Total bilirubin 1.4 otherwise liver 

function tests are normal.  Nasal MRSA screen is negative.





4/5: Patient remains in the intensive care unit.  Patient underwent CAT scan 

yesterday for TAVR protoxol and was found to have moderate size right pelvic 

retroperitoneal hematoma extending into the lower abdomen presumed from att

empted right groin catheter insertion.  Consult was added for vascular surgery 

with plan to continue supportive care, no plan for surgical intervention.  

Heparin was discontinued.  Patient was transfused 1 unit of packed RBCs for 

hemoglobin of 6.9 with repeat hemoglobin of 7.6.


Patient has been afebrile, heart rate 70, blood pressure 109/62, pulse ox 97%.  

Cardiac monitor is sinus rhythm.  Patient has also been seen and followed by 

psychiatry for depression and acute bereavement with the loss of her , 

recommendations Zoloft 125 mg daily at bedtime.





4/6: Patient remains in the intensive care unit.  No signs of bleeding and no 

worsening hematoma with hemoglobin stable at 7.6. Vascular surgery has cleared 

patient to resume heparin if needed, continue to monitor for bleeding and daily 

CBC.  Plan is for Dr. Jacobs will proceed with intervention tomorrow.  Patient

is also followed by intensivist, cardiothoracic surgery and psychiatry.





4/7: Patient is status post stent in the RCA today with Dr. Jacobs.  She is 

seen postprocedure and is comfortable.  She denies having any chest pain.  

Anxiety is controlled.  Approach was from the left groin, no bleeding or 

hematoma noted.  Patient remains afebrile, heart rate in the 80s, blood pressure

92/57, pulse ox 94% on 2 L nasal cannula.  Repeat hemoglobin is stable at 7.5, 

WBC 11.5.  Electrolytes are normal, BUN 21 creatinine 0.61.





4/8: Patient remains in the intensive care unit status post stent of the RCA 

yesterday.  Patient has moderate to severe LAD lesion with significant FFR to be

addressed at a later time as well asTAVR will be planned for the next 1-2 weeks.

 No hematoma to the left groin.  Patient is waiting for a bed on the cardiac 

stepdown unit. Vascular surgeries following for retroperitoneal hematoma that 

does not appear to be actively bleeding, plan for supportive care.  Patient 

remains afebrile heart rate 85, blood pressure 96/54, pulse ox 98% on room air..

 BUN 19 and creatinine 0.62.  Anticipate that patient will be able to work with 

PT and OT determine home care versus subacute rehab needs.





4/9: Patient was moved out of the ICU to a telemetry unit, she is in a better 

spurs, she responded very well to the sertraline 125 mg at bedtime, she is 

continued to have minimal cough no phlegm production, she is using Flonase and 

Claritin for postnasal drip, she denies any abdominal pain, she has not had a 

bowel movement attempted we will start the patient on lactulose 20 g orally 

twice every day, start the patient on Senokot 2 tablets at bedtime, follow-up 

with the patient very closely, we'll discuss with cardiology and cardiothoracic 

surgery team whether the patient would have TAVR next week. 





4/10: Patient sitting up in bed that she is feeling better today she continues 

to have significant constipation, she has not had a bowel movement despite all 

the medications, physical therapy will see the patient move the patient on, 

patient will likely be transferred to subacute rehabilitation initially 4 hours 

if there is no plan to doTAVR this coming week. 





4/11: Patient is seen today on the cardiac stepdown unit.  She denies having any

chest pain at this time but states she had a little bit of pressure in her chest

when she was getting back into bed.  She also states she has some cramping in 

her right leg.  She did work with physical therapy this morning and the 

recommendations are subacute rehab.  Patient is having prune juice now has 

started had MiraLAX but no bowel movement.  Patient's been afebrile, heart rate 

in 70s and 80s, blood pressure 103/69, pulse ox 98% on 2 L nasal cannula.  Blood

work reveals WBC 9.8, hemoglobin 8.1, platelet count 230.  Electrolytes are 

normal.  BUN 16 creatinine 0.54.  Total bilirubin 1.9.  Other liver function 

tests are normal.  Patient is ready for discharge to subacute rehab but we will 

hold as plan is not in place yet and patient is having ongoing constipation.  

Plan for discharge to [facility] tomorrow once arrangements are completed.  Patient

is not appropriate for inpatient rehab.





4/12: Patient states that she started having some nausea last evening.  She is 

still not had a bowel movement.  She is taking prune juice and also on lactulo

se, MiraLAX and Senokot.  She is refusing her medications today.  Patient is 

afebrile, heart rate in the 80s, blood pressure 78/44, pulse ox 99 on 2 L nasal 

cannula.  Digoxin level 0.6.  Insurance authorization has been obtained for 

subacute rehab at [facility].  Plan to monitor patient overnight, continue bowel 

regime and hopefully discharge tomorrow





4/13: She continues to have nausea and constipation.  Abdominal x-ray from 

yesterday revealed fecal impaction.  Stool distends the rectum up to 8.2 cm 

wide.  Dulcolax suppository, fleets enema ordered.  Breathing status is stable. 

Anticipate discharge to [facility] tomorrow.  Patient did have large bowel movement

yesterday





4/14: patient is found today sitting up and recliner and appears to be 

comfortable.  Patient did have a very large bowel movement yesterday and 

abdominal pain and nausea have resolved.  She has worked with physical therapy a

nd still deemed appropriate for subacute rehab.  She denies any dizziness or 

lightheadedness when she was up.blood pressures been on the lower side and 

medication administration has been staggered to address this.  Most recently 

blood pressure 100/58.


WBC 7.6, hemoglobin 8.2.  Electrolytes and renal function normal.  Magnesium 

2.4.  Digoxin level 0.6.  Patient was prepared for discharge to [facility] however 

Covid 19 test which was required for the nursing home came back positive and 

discharge plan needed to be reevaluated.  [facility] 

[facility] may take patient.  We will hold discharge

until discharging plan is completed.








REVIEW OF SYSTEMS


Constitutional: No fever, no chills, no night sweats.  No weight change.  

Positive for generalized weakness, positive for fatigue or lethargy.  No daytime

sleepiness.


HEENT: No headache.  No blurred vision or double vision, no loss of vision.  No 

loss of Hearing, no ringing in the ears, no dizziness.  No nasal drainage or 

congestion.  No epistaxis.  No sore throat.


Lungs: Positive for shortness of breath, positive for cough, no sputum 

production.  No wheezing.  Reports dyspnea on exertion.


Cardiovascular: No chest pain, no lower extremity edema.  No palpitations.  No 

paroxysmal nocturnal dyspnea.  No orthopnea.  No lightheadedness or dizziness.  

No syncopal episodes.


Abdominal: No right lower quadrant abdominal pain.  No nausea, vomiting.  No 

diarrhea.  Reports severe constipation has resolved.  No bloody or tarry stools.

 Reports loss of appetite.


Genitourinary: No dysuria, increased frequency, urgency.  No urinary retention.


Musculoskeletal: No myalgias.  Generalized muscle weakness, reported gait 

dysfunction requiring significant assistance, no frequent falls.  Positive for 

back pain.  No neck pain.


Integumentary: No wounds, no lesions.  No rash or pruritus.  Positive for 

bruising.  No change in hair or nails.


Neurologic: No aphasia. No facial droop. No change in mentation. No head injury.

No headache. No paralysis. No paresthesia.


Psychiatric: Positive for depression.  Reports anxiety.  Positive for mood 

swings


Endocrine: No abnormal blood sugars.  Positive for weight change.   





PHYSICAL EXAMINATION


Gen: This is a 71-year-old  female.


HEENT: Head is atraumatic, normocephalic. Pupils equal, round. Sclerae is 

anicteric. 


NECK: Supple. No JVD. No lymphadenopathy. No thyromegaly. 


LUNGS: Decreased breath sounds at the bases, decreased tactile fremitus at the 

right lower third, few rhonchi, no expiratory wheeze, no chest wall tenderness. 

No intercostal retractions.


HEART: First heart sound is depressed, second heart sound is normal, systolic 

ejection murmur 3/6 at the right upper sternal border radiating to the base of 

the neck, systolic ejection murmur 2/6 at the apex rating to the axilla.


ABDOMEN: Soft, +tenderness generalized, nondistended, positive bowel sounds, 

multiple ecchymosis.


EXTREMITIES: 1+ pedal edema.  No calf tenderness.  Dorsalis pedis +1 

bilaterally. 


NEUROLOGICAL: Patient is awake, alert and oriented x3. Cranial nerves 2 through 

12 are grossly intact.,  Cranial nerves II-12 appear grossly intact, muscle 

power 4 out of 5 in upper and lower extremities bilaterally.





ASSESSMENT AND PLAN


1.   Non ST elevation  myocardial infarction with history of prior dilated 

cardiomyopathy status post coronary angiography finding right dominant vessel, 

99% ostial stenosis, aortic pressure is 100/70, 40 mm gradient across the aortic

valve.  Left ventricular end diastolic pressure is 2530.  Patient was 

transferred to [facility] for PTCA and stent placement of the 

RCA which was unsuccessful.  Consult with cardiothoracic surgery appreciated.  

Intensivist consultation appreciated.  Continue aspirin 81 mg daily, PLAVIX 75 

MG orally daily, Coreg 6.25 mg twice daily,continue Atorvastatin 40 mg orally 

daily, now with successful   Stent of the RCA with Dr. Jacobs 4/7. LAD 

stenosis to be addressed at a later time.





2.  Dilated cardiomyopathy.  continue patient on carvedilol 3.125 mg orally 

twice every day, digoxin 125 g daily, we will continue with spironolactone 25 

mg orally daily.  Digoxin level .





3.  Severe aortic valve stenosis with mild aortic regurgitation.  TAVR to be 

scheduled in the near future.





4.  Moderate to Severe mitral regurgitation. likely will continue with 

conservative management no plan for mitral valve repair. 





5.  Hyperlipidemia.   She was started on atorvastatin 40 mg once every day.





6.  Obesity with obstructive sleep apnea and obesity hypoventilation syndrome.  

Patient has not had sleep study done yet. 





7.  Chronic hypoxic respiratory failure secondary to his Covid 19 and underlying

COPD and possible obstructive sleep apnea and hypoventilation syndrome.  Patient

is normally on 2 L nasal cannula.





8.  Acute on chronic systolic heart failure.  Off Lasix, continue Coreg, 

Aldactone 25 mg daily.  





9.  Hypertension, hypertensive cardiovascular disease. continue carvedilol 3.125

mg orally twice every day patient is not on ACE-I or ARB due to severe 

hypertension,  





10.  Recurrent depression and generalized anxiety disorder.  Consult consult 

with psychiatry appreciated.  Patient on Zoloft 125 mg at bedtime


 


11.  DVT prophylaxis. we will continue with Lovenox 40 mg SC daily 





12.  GI  prophylaxis.  Protonix 40 mg IV push daily. 





13.  Acute retroperitoneal hematoma with acute blood loss anemia status post 

transfusion 1 unit of packed RBCs.  Consult with vascular surgery appreciated. 

No plan for any surgical interventions.  Monitor closely for bleeding.





14.  Severe constipation, resolved.  Continue patient on Senokot 2 tablet at 

bedtime along with lactulose 20 g orally twice every day, MiraLAX 17 g daily, 

prune juice, and fleets enema..





Full code.








DISCHARGE PLAN


To be determined





Impression and plan of care have been directed as dictated by the signing 

physician.  Charla Guevara nurse practitioner acting as scribe for signing 

physician.








Objective





- Vital Signs


Vital signs: 


                                   Vital Signs











Temp  98.2 F   04/15/22 09:57


 


Pulse  86   04/15/22 09:57


 


Resp  17   04/15/22 09:57


 


BP  95/50   04/15/22 09:57


 


Pulse Ox  99   04/15/22 09:57








                                 Intake & Output











 04/14/22 04/15/22 04/15/22





 18:59 06:59 18:59


 


Output Total 500 50 


 


Balance -500 -50 


 


Weight 111.9 kg  


 


Output:   


 


  Urine 500 50 


 


Other:   


 


  Voiding Method Incontinent Incontinent 





 External Catheter External Catheter 


 


  # Voids  1 








                       ABP, PAP, CO, CI - Last Documented











Arterial Blood Pressure        107/66

















- Labs


CBC & Chem 7: 


                                 04/14/22 08:01





                                 04/14/22 08:01


Labs: 


                  Abnormal Lab Results - Last 24 Hours (Table)











  04/14/22 Range/Units





  13:21 


 


Coronavirus (PCR)  Detected A  (Not Detectd)

## 2023-07-07 ENCOUNTER — HOSPITAL ENCOUNTER (OUTPATIENT)
Dept: HOSPITAL 47 - LABWHC1 | Age: 73
Discharge: HOME | End: 2023-07-07
Attending: THORACIC SURGERY (CARDIOTHORACIC VASCULAR SURGERY)
Payer: MEDICARE

## 2023-07-07 DIAGNOSIS — I35.1: Primary | ICD-10-CM

## 2023-07-07 LAB
ALBUMIN SERPL-MCNC: 4.5 D/DL (ref 3.8–4.9)
ALBUMIN/GLOB SERPL: 1.8 RATIO (ref 1.6–3.17)
ALP SERPL-CCNC: 159 U/L (ref 41–126)
ALT SERPL-CCNC: 31 U/L (ref 8–44)
ANION GAP SERPL CALC-SCNC: 11.9 MMOL/L (ref 4–12)
AST SERPL-CCNC: 28 U/L (ref 13–35)
BASOPHILS # BLD AUTO: 0.08 X 10*3/UL (ref 0–0.1)
BASOPHILS NFR BLD AUTO: 0.8 %
BUN SERPL-SCNC: 17.8 MG/DL (ref 9–27)
BUN/CREAT SERPL: 29.67 RATIO (ref 12–20)
CALCIUM SPEC-MCNC: 10.1 MG/DL (ref 8.7–10.3)
CHLORIDE SERPL-SCNC: 103 MMOL/L (ref 96–109)
CO2 SERPL-SCNC: 26.1 MMOL/L (ref 21.6–31.8)
EOSINOPHIL # BLD AUTO: 0.21 X 10*3/UL (ref 0.04–0.35)
EOSINOPHIL NFR BLD AUTO: 2.1 %
ERYTHROCYTE [DISTWIDTH] IN BLOOD BY AUTOMATED COUNT: 5.53 X 10*6/UL (ref 4.1–5.2)
ERYTHROCYTE [DISTWIDTH] IN BLOOD: 17.2 % (ref 11.5–14.5)
GLOBULIN SER CALC-MCNC: 2.5 D/DL (ref 1.6–3.3)
GLUCOSE SERPL-MCNC: 98 MG/DL (ref 70–110)
HCT VFR BLD AUTO: 49 % (ref 37.2–46.3)
HGB BLD-MCNC: 14.4 D/DL (ref 12–15)
IMM GRANULOCYTES BLD QL AUTO: 0.8 %
LYMPHOCYTES # SPEC AUTO: 0.99 X 10*3/UL (ref 0.9–5)
LYMPHOCYTES NFR SPEC AUTO: 9.7 %
MCH RBC QN AUTO: 26 PG (ref 27–32)
MCHC RBC AUTO-ENTMCNC: 29.4 D/DL (ref 32–37)
MCV RBC AUTO: 88.6 FL (ref 80–97)
MONOCYTES # BLD AUTO: 0.59 X 10*3/UL (ref 0.2–1)
MONOCYTES NFR BLD AUTO: 5.8 %
NEUTROPHILS # BLD AUTO: 8.23 X 10*3/UL (ref 1.8–7.7)
NEUTROPHILS NFR BLD AUTO: 80.8 %
NRBC BLD AUTO-RTO: 0 X 10*3/UL (ref 0–0.01)
PLATELET # BLD AUTO: 219 X 10*3/UL (ref 140–440)
POTASSIUM SERPL-SCNC: 4.7 MMOL/L (ref 3.5–5.5)
PROT SERPL-MCNC: 7 D/DL (ref 6.2–8.2)
SODIUM SERPL-SCNC: 141 MMOL/L (ref 135–145)
WBC # BLD AUTO: 10.18 X 10*3/UL (ref 4.5–10)

## 2023-07-07 PROCEDURE — 36415 COLL VENOUS BLD VENIPUNCTURE: CPT

## 2023-07-07 PROCEDURE — 80053 COMPREHEN METABOLIC PANEL: CPT

## 2023-07-07 PROCEDURE — 85025 COMPLETE CBC W/AUTO DIFF WBC: CPT

## 2025-04-29 ENCOUNTER — HOSPITAL ENCOUNTER (OUTPATIENT)
Dept: HOSPITAL 47 - LABWHC1 | Age: 75
Discharge: HOME | End: 2025-04-29
Attending: INTERNAL MEDICINE
Payer: MEDICARE

## 2025-04-29 DIAGNOSIS — E78.2: Primary | ICD-10-CM

## 2025-04-29 LAB
ALT SERPL-CCNC: 21 U/L (ref 8–44)
AST SERPL-CCNC: 24 U/L (ref 13–35)
LDLC SERPL CALC-MCNC: 108.4 MG/DL (ref 0–131)

## 2025-04-29 PROCEDURE — 36415 COLL VENOUS BLD VENIPUNCTURE: CPT

## 2025-04-29 PROCEDURE — 80061 LIPID PANEL: CPT

## 2025-04-29 PROCEDURE — 84460 ALANINE AMINO (ALT) (SGPT): CPT

## 2025-04-29 PROCEDURE — 84450 TRANSFERASE (AST) (SGOT): CPT
